# Patient Record
Sex: FEMALE | Race: WHITE | NOT HISPANIC OR LATINO | URBAN - METROPOLITAN AREA
[De-identification: names, ages, dates, MRNs, and addresses within clinical notes are randomized per-mention and may not be internally consistent; named-entity substitution may affect disease eponyms.]

---

## 2017-01-12 ENCOUNTER — INPATIENT (INPATIENT)
Facility: HOSPITAL | Age: 82
LOS: 4 days | Discharge: NURSING, REHAB OR LONG-TERM CARE | DRG: 312 | End: 2017-01-17
Attending: INTERNAL MEDICINE | Admitting: INTERNAL MEDICINE
Payer: MEDICARE

## 2017-01-12 VITALS
SYSTOLIC BLOOD PRESSURE: 160 MMHG | OXYGEN SATURATION: 90 % | WEIGHT: 134.92 LBS | RESPIRATION RATE: 18 BRPM | HEART RATE: 105 BPM | TEMPERATURE: 98 F | DIASTOLIC BLOOD PRESSURE: 110 MMHG

## 2017-01-12 DIAGNOSIS — R55 SYNCOPE AND COLLAPSE: ICD-10-CM

## 2017-01-12 LAB
ALBUMIN SERPL ELPH-MCNC: 3.4 G/DL — SIGNIFICANT CHANGE UP (ref 3.3–5)
ALP SERPL-CCNC: 82 U/L — SIGNIFICANT CHANGE UP (ref 40–120)
ALT FLD-CCNC: 20 U/L — SIGNIFICANT CHANGE UP (ref 12–78)
ANION GAP SERPL CALC-SCNC: 7 MMOL/L — SIGNIFICANT CHANGE UP (ref 5–17)
APPEARANCE UR: CLEAR — SIGNIFICANT CHANGE UP
AST SERPL-CCNC: 21 U/L — SIGNIFICANT CHANGE UP (ref 15–37)
BASOPHILS # BLD AUTO: 0.1 K/UL — SIGNIFICANT CHANGE UP (ref 0–0.2)
BASOPHILS NFR BLD AUTO: 1.2 % — SIGNIFICANT CHANGE UP (ref 0–2)
BILIRUB SERPL-MCNC: 0.2 MG/DL — SIGNIFICANT CHANGE UP (ref 0.2–1.2)
BILIRUB UR-MCNC: NEGATIVE — SIGNIFICANT CHANGE UP
BUN SERPL-MCNC: 16 MG/DL — SIGNIFICANT CHANGE UP (ref 7–23)
CALCIUM SERPL-MCNC: 8.7 MG/DL — SIGNIFICANT CHANGE UP (ref 8.5–10.1)
CHLORIDE SERPL-SCNC: 100 MMOL/L — SIGNIFICANT CHANGE UP (ref 96–108)
CO2 SERPL-SCNC: 30 MMOL/L — SIGNIFICANT CHANGE UP (ref 22–31)
COLOR SPEC: YELLOW — SIGNIFICANT CHANGE UP
CREAT SERPL-MCNC: 0.75 MG/DL — SIGNIFICANT CHANGE UP (ref 0.5–1.3)
DIFF PNL FLD: NEGATIVE — SIGNIFICANT CHANGE UP
EOSINOPHIL # BLD AUTO: 0.3 K/UL — SIGNIFICANT CHANGE UP (ref 0–0.5)
EOSINOPHIL NFR BLD AUTO: 6.7 % — HIGH (ref 0–6)
GLUCOSE SERPL-MCNC: 114 MG/DL — HIGH (ref 70–99)
GLUCOSE UR QL: NEGATIVE — SIGNIFICANT CHANGE UP
HCT VFR BLD CALC: 35.9 % — SIGNIFICANT CHANGE UP (ref 34.5–45)
HGB BLD-MCNC: 12 G/DL — SIGNIFICANT CHANGE UP (ref 11.5–15.5)
KETONES UR-MCNC: NEGATIVE — SIGNIFICANT CHANGE UP
LEUKOCYTE ESTERASE UR-ACNC: NEGATIVE — SIGNIFICANT CHANGE UP
LYMPHOCYTES # BLD AUTO: 1 K/UL — SIGNIFICANT CHANGE UP (ref 1–3.3)
LYMPHOCYTES # BLD AUTO: 20.8 % — SIGNIFICANT CHANGE UP (ref 13–44)
MCHC RBC-ENTMCNC: 29.9 PG — SIGNIFICANT CHANGE UP (ref 27–34)
MCHC RBC-ENTMCNC: 33.3 GM/DL — SIGNIFICANT CHANGE UP (ref 32–36)
MCV RBC AUTO: 89.8 FL — SIGNIFICANT CHANGE UP (ref 80–100)
MONOCYTES # BLD AUTO: 0.4 K/UL — SIGNIFICANT CHANGE UP (ref 0–0.9)
MONOCYTES NFR BLD AUTO: 7.5 % — SIGNIFICANT CHANGE UP (ref 1–9)
NEUTROPHILS # BLD AUTO: 3.1 K/UL — SIGNIFICANT CHANGE UP (ref 1.8–7.4)
NEUTROPHILS NFR BLD AUTO: 63.7 % — SIGNIFICANT CHANGE UP (ref 43–77)
NITRITE UR-MCNC: NEGATIVE — SIGNIFICANT CHANGE UP
PH UR: 7 — SIGNIFICANT CHANGE UP (ref 4.8–8)
PLATELET # BLD AUTO: 263 K/UL — SIGNIFICANT CHANGE UP (ref 150–400)
POTASSIUM SERPL-MCNC: 4 MMOL/L — SIGNIFICANT CHANGE UP (ref 3.5–5.3)
POTASSIUM SERPL-SCNC: 4 MMOL/L — SIGNIFICANT CHANGE UP (ref 3.5–5.3)
PROT SERPL-MCNC: 7.2 G/DL — SIGNIFICANT CHANGE UP (ref 6–8.3)
PROT UR-MCNC: NEGATIVE — SIGNIFICANT CHANGE UP
RBC # BLD: 4 M/UL — SIGNIFICANT CHANGE UP (ref 3.8–5.2)
RBC # FLD: 11.4 % — SIGNIFICANT CHANGE UP (ref 10.3–14.5)
SODIUM SERPL-SCNC: 137 MMOL/L — SIGNIFICANT CHANGE UP (ref 135–145)
SP GR SPEC: 1 — LOW (ref 1.01–1.02)
TROPONIN I SERPL-MCNC: <.015 NG/ML — SIGNIFICANT CHANGE UP (ref 0.01–0.04)
UROBILINOGEN FLD QL: NEGATIVE — SIGNIFICANT CHANGE UP
WBC # BLD: 4.9 K/UL — SIGNIFICANT CHANGE UP (ref 3.8–10.5)
WBC # FLD AUTO: 4.9 K/UL — SIGNIFICANT CHANGE UP (ref 3.8–10.5)

## 2017-01-12 PROCEDURE — 71010: CPT | Mod: 26

## 2017-01-12 PROCEDURE — 70450 CT HEAD/BRAIN W/O DYE: CPT | Mod: 26

## 2017-01-12 PROCEDURE — 93010 ELECTROCARDIOGRAM REPORT: CPT

## 2017-01-12 PROCEDURE — 99285 EMERGENCY DEPT VISIT HI MDM: CPT

## 2017-01-12 RX ORDER — ASPIRIN/CALCIUM CARB/MAGNESIUM 324 MG
81 TABLET ORAL DAILY
Qty: 0 | Refills: 0 | Status: DISCONTINUED | OUTPATIENT
Start: 2017-01-12 | End: 2017-01-17

## 2017-01-12 RX ORDER — ACETAMINOPHEN 500 MG
650 TABLET ORAL ONCE
Qty: 0 | Refills: 0 | Status: COMPLETED | OUTPATIENT
Start: 2017-01-12 | End: 2017-01-12

## 2017-01-12 RX ORDER — LISINOPRIL 2.5 MG/1
40 TABLET ORAL DAILY
Qty: 0 | Refills: 0 | Status: DISCONTINUED | OUTPATIENT
Start: 2017-01-12 | End: 2017-01-17

## 2017-01-12 RX ORDER — ENOXAPARIN SODIUM 100 MG/ML
40 INJECTION SUBCUTANEOUS DAILY
Qty: 0 | Refills: 0 | Status: DISCONTINUED | OUTPATIENT
Start: 2017-01-12 | End: 2017-01-17

## 2017-01-12 RX ORDER — GABAPENTIN 400 MG/1
100 CAPSULE ORAL DAILY
Qty: 0 | Refills: 0 | Status: DISCONTINUED | OUTPATIENT
Start: 2017-01-12 | End: 2017-01-17

## 2017-01-12 RX ORDER — INFLUENZA VIRUS VACCINE 15; 15; 15; 15 UG/.5ML; UG/.5ML; UG/.5ML; UG/.5ML
0.5 SUSPENSION INTRAMUSCULAR ONCE
Qty: 0 | Refills: 0 | Status: COMPLETED | OUTPATIENT
Start: 2017-01-12 | End: 2017-01-17

## 2017-01-12 RX ORDER — SODIUM CHLORIDE 9 MG/ML
1000 INJECTION INTRAMUSCULAR; INTRAVENOUS; SUBCUTANEOUS ONCE
Qty: 0 | Refills: 0 | Status: COMPLETED | OUTPATIENT
Start: 2017-01-12 | End: 2017-01-12

## 2017-01-12 RX ORDER — MECLIZINE HCL 12.5 MG
12.5 TABLET ORAL THREE TIMES A DAY
Qty: 0 | Refills: 0 | Status: DISCONTINUED | OUTPATIENT
Start: 2017-01-12 | End: 2017-01-17

## 2017-01-12 RX ORDER — ATORVASTATIN CALCIUM 80 MG/1
10 TABLET, FILM COATED ORAL AT BEDTIME
Qty: 0 | Refills: 0 | Status: DISCONTINUED | OUTPATIENT
Start: 2017-01-12 | End: 2017-01-17

## 2017-01-12 RX ORDER — LEVOTHYROXINE SODIUM 125 MCG
75 TABLET ORAL DAILY
Qty: 0 | Refills: 0 | Status: DISCONTINUED | OUTPATIENT
Start: 2017-01-12 | End: 2017-01-17

## 2017-01-12 RX ORDER — METOPROLOL TARTRATE 50 MG
50 TABLET ORAL DAILY
Qty: 0 | Refills: 0 | Status: DISCONTINUED | OUTPATIENT
Start: 2017-01-12 | End: 2017-01-14

## 2017-01-12 RX ORDER — AMLODIPINE BESYLATE 2.5 MG/1
10 TABLET ORAL DAILY
Qty: 0 | Refills: 0 | Status: DISCONTINUED | OUTPATIENT
Start: 2017-01-12 | End: 2017-01-17

## 2017-01-12 RX ORDER — MECLIZINE HCL 12.5 MG
25 TABLET ORAL ONCE
Qty: 0 | Refills: 0 | Status: COMPLETED | OUTPATIENT
Start: 2017-01-12 | End: 2017-01-12

## 2017-01-12 RX ADMIN — SODIUM CHLORIDE 1000 MILLILITER(S): 9 INJECTION INTRAMUSCULAR; INTRAVENOUS; SUBCUTANEOUS at 15:44

## 2017-01-12 RX ADMIN — Medication 650 MILLIGRAM(S): at 23:20

## 2017-01-12 RX ADMIN — ATORVASTATIN CALCIUM 10 MILLIGRAM(S): 80 TABLET, FILM COATED ORAL at 23:20

## 2017-01-12 RX ADMIN — Medication 25 MILLIGRAM(S): at 15:44

## 2017-01-12 NOTE — ED PROVIDER NOTE - CARE PLAN
Principal Discharge DX:	Syncope, unspecified syncope type Principal Discharge DX:	Syncope, unspecified syncope type  Secondary Diagnosis:	Vertigo

## 2017-01-12 NOTE — ED PROVIDER NOTE - OBJECTIVE STATEMENT
frequent falls over the last week 2/2 to intermittent dizziness and room spinning but cannot rule out syncope based on pt's story.  pt c/o progressive weakness in the lower extremities.  No n/v/d, no cough, no fevers/chills.

## 2017-01-12 NOTE — H&P ADULT. - HISTORY OF PRESENT ILLNESS
ALLERGY nka CONTACT Dtr Rosemarie Gatuam [phone]  REASON FOR VISIT  Initial evaluation/Admission HNP  requested  by Dr Duarte from Dr Lugo 1/12/2017  Patient examined chart reviewed  HOSPITAL ADMISSION [facility] P Dr Mahmood 1/12/2017 1/12/2017 W 4.9 Hb 12 Plt 263 Na 137 K 4 CO2 30 Cr .7 G 114  1/12 Tr 1 n  1/12 CXR napd   ASSESSMENT/RECOMMENDATION(S)  89 f PMH HLD Htn Hypothyroidism co with frequent falls over last week sec to intermittent dizziness admitted 1/12/2017 with worsening dizziness possible actual syncope episodes Patient co progressive weakness legs  HOME MEDS Synthroid 75 Norvasc 10 metoprolol 50 gabapentin 100 pravastatin 40 oxybutynin 15 mirtazapine 15 lisinopril 40   ER VITALS 1/12 afeb 105 160/110 RA 90% 61k    POSSIBLE SYNCOPE   1/12 CT Head NC Chr lacunar scars r corona radiata and l ant basal ganglia Wallerian degeneration r cerebral peduncle Large L mastoid effusion   Cardiac monitor Neuro Cardio eval   MASTOID EFFUSION   1/12 Consider ENT eval/ ID eval   HYPERTENSION Norvasc 10 (1/12) Lisinopril 40 91/12) Metoprolol 50 (1/12)   DIZZINESS Meclizine 12.5x3p (1/12)   DIET DASH (1/12)

## 2017-01-12 NOTE — H&P ADULT. - ASSESSMENT
ALLERGY nka CONTACT Dtr Rosemarie Gautam [phone]  REASON FOR VISIT  Initial evaluation/Admission HNP  requested  by Dr Duarte from Dr Lugo 1/12/2017  Patient examined chart reviewed  HOSPITAL ADMISSION [facility] P Dr Mahmood 1/12/2017 1/12/2017 W 4.9 Hb 12 Plt 263 Na 137 K 4 CO2 30 Cr .7 G 114  1/12 Tr 1 n  1/12 CXR napd   ASSESSMENT/RECOMMENDATION(S)  89 f PMH HLD Htn Hypothyroidism co with frequent falls over last week sec to intermittent dizziness admitted 1/12/2017 with worsening dizziness possible actual syncope episodes Patient co progressive weakness legs  HOME MEDS Synthroid 75 Norvasc 10 metoprolol 50 gabapentin 100 pravastatin 40 oxybutynin 15 mirtazapine 15 lisinopril 40   ER VITALS 1/12 afeb 105 160/110 RA 90% 61k    POSSIBLE SYNCOPE   1/12 CT Head NC Chr lacunar scars r corona radiata and l ant basal ganglia Wallerian degeneration r cerebral peduncle Large L mastoid effusion   Cardiac monitor Neuro Cardio eval   MASTOID EFFUSION   1/12 Consider ENT eval/ ID eval   HYPERTENSION Norvasc 10 (1/12) Lisinopril 40 91/12) Metoprolol 50 (1/12)   DIZZINESS Meclizine 12.5x3p (1/12)   DIET DASH (1/12)

## 2017-01-12 NOTE — ED ADULT NURSE NOTE - OBJECTIVE STATEMENT
Pt presents to the ED c/o dizziness with movement, EKG done, pt placed on cardiac monitor. Skin warm and dry, = sensation, + capillary refill < 3 sec, + edema bilat lower extremities.

## 2017-01-13 LAB
ANION GAP SERPL CALC-SCNC: 10 MMOL/L — SIGNIFICANT CHANGE UP (ref 5–17)
BASOPHILS # BLD AUTO: 0 K/UL — SIGNIFICANT CHANGE UP (ref 0–0.2)
BASOPHILS NFR BLD AUTO: 0.8 % — SIGNIFICANT CHANGE UP (ref 0–2)
BUN SERPL-MCNC: 14 MG/DL — SIGNIFICANT CHANGE UP (ref 7–23)
CALCIUM SERPL-MCNC: 8.7 MG/DL — SIGNIFICANT CHANGE UP (ref 8.5–10.1)
CHLORIDE SERPL-SCNC: 100 MMOL/L — SIGNIFICANT CHANGE UP (ref 96–108)
CO2 SERPL-SCNC: 29 MMOL/L — SIGNIFICANT CHANGE UP (ref 22–31)
CREAT SERPL-MCNC: 0.82 MG/DL — SIGNIFICANT CHANGE UP (ref 0.5–1.3)
EOSINOPHIL # BLD AUTO: 0.2 K/UL — SIGNIFICANT CHANGE UP (ref 0–0.5)
EOSINOPHIL NFR BLD AUTO: 4.3 % — SIGNIFICANT CHANGE UP (ref 0–6)
GLUCOSE SERPL-MCNC: 166 MG/DL — HIGH (ref 70–99)
HCT VFR BLD CALC: 39.9 % — SIGNIFICANT CHANGE UP (ref 34.5–45)
HGB BLD-MCNC: 12.9 G/DL — SIGNIFICANT CHANGE UP (ref 11.5–15.5)
INR BLD: 1.07 RATIO — SIGNIFICANT CHANGE UP (ref 0.88–1.16)
LYMPHOCYTES # BLD AUTO: 1 K/UL — SIGNIFICANT CHANGE UP (ref 1–3.3)
LYMPHOCYTES # BLD AUTO: 20.4 % — SIGNIFICANT CHANGE UP (ref 13–44)
MCHC RBC-ENTMCNC: 29.3 PG — SIGNIFICANT CHANGE UP (ref 27–34)
MCHC RBC-ENTMCNC: 32.2 GM/DL — SIGNIFICANT CHANGE UP (ref 32–36)
MCV RBC AUTO: 90.8 FL — SIGNIFICANT CHANGE UP (ref 80–100)
MONOCYTES # BLD AUTO: 0.3 K/UL — SIGNIFICANT CHANGE UP (ref 0–0.9)
MONOCYTES NFR BLD AUTO: 7 % — SIGNIFICANT CHANGE UP (ref 1–9)
NEUTROPHILS # BLD AUTO: 3.4 K/UL — SIGNIFICANT CHANGE UP (ref 1.8–7.4)
NEUTROPHILS NFR BLD AUTO: 67.6 % — SIGNIFICANT CHANGE UP (ref 43–77)
PLATELET # BLD AUTO: 273 K/UL — SIGNIFICANT CHANGE UP (ref 150–400)
POTASSIUM SERPL-MCNC: 3.4 MMOL/L — LOW (ref 3.5–5.3)
POTASSIUM SERPL-SCNC: 3.4 MMOL/L — LOW (ref 3.5–5.3)
PROTHROM AB SERPL-ACNC: 11.9 SEC — SIGNIFICANT CHANGE UP (ref 10–13.1)
RBC # BLD: 4.4 M/UL — SIGNIFICANT CHANGE UP (ref 3.8–5.2)
RBC # FLD: 11.5 % — SIGNIFICANT CHANGE UP (ref 10.3–14.5)
SODIUM SERPL-SCNC: 139 MMOL/L — SIGNIFICANT CHANGE UP (ref 135–145)
TROPONIN I SERPL-MCNC: <.015 NG/ML — SIGNIFICANT CHANGE UP (ref 0.01–0.04)
TROPONIN I SERPL-MCNC: <.015 NG/ML — SIGNIFICANT CHANGE UP (ref 0.01–0.04)
WBC # BLD: 5 K/UL — SIGNIFICANT CHANGE UP (ref 3.8–10.5)
WBC # FLD AUTO: 5 K/UL — SIGNIFICANT CHANGE UP (ref 3.8–10.5)

## 2017-01-13 PROCEDURE — 70551 MRI BRAIN STEM W/O DYE: CPT | Mod: 26

## 2017-01-13 RX ORDER — POTASSIUM CHLORIDE 20 MEQ
40 PACKET (EA) ORAL ONCE
Qty: 0 | Refills: 0 | Status: COMPLETED | OUTPATIENT
Start: 2017-01-13 | End: 2017-01-13

## 2017-01-13 RX ORDER — ACETAMINOPHEN 500 MG
650 TABLET ORAL ONCE
Qty: 0 | Refills: 0 | Status: COMPLETED | OUTPATIENT
Start: 2017-01-13 | End: 2017-01-13

## 2017-01-13 RX ORDER — SODIUM CHLORIDE 9 MG/ML
1000 INJECTION INTRAMUSCULAR; INTRAVENOUS; SUBCUTANEOUS
Qty: 0 | Refills: 0 | Status: DISCONTINUED | OUTPATIENT
Start: 2017-01-13 | End: 2017-01-15

## 2017-01-13 RX ADMIN — Medication 650 MILLIGRAM(S): at 12:19

## 2017-01-13 RX ADMIN — Medication 50 MILLIGRAM(S): at 06:11

## 2017-01-13 RX ADMIN — ENOXAPARIN SODIUM 40 MILLIGRAM(S): 100 INJECTION SUBCUTANEOUS at 12:04

## 2017-01-13 RX ADMIN — LISINOPRIL 40 MILLIGRAM(S): 2.5 TABLET ORAL at 06:11

## 2017-01-13 RX ADMIN — Medication 40 MILLIEQUIVALENT(S): at 14:50

## 2017-01-13 RX ADMIN — Medication 650 MILLIGRAM(S): at 13:04

## 2017-01-13 RX ADMIN — GABAPENTIN 100 MILLIGRAM(S): 400 CAPSULE ORAL at 12:04

## 2017-01-13 RX ADMIN — AMLODIPINE BESYLATE 10 MILLIGRAM(S): 2.5 TABLET ORAL at 06:11

## 2017-01-13 RX ADMIN — Medication 650 MILLIGRAM(S): at 00:13

## 2017-01-13 RX ADMIN — Medication 75 MICROGRAM(S): at 06:11

## 2017-01-13 RX ADMIN — ATORVASTATIN CALCIUM 10 MILLIGRAM(S): 80 TABLET, FILM COATED ORAL at 21:58

## 2017-01-13 RX ADMIN — Medication 81 MILLIGRAM(S): at 12:04

## 2017-01-13 RX ADMIN — SODIUM CHLORIDE 50 MILLILITER(S): 9 INJECTION INTRAMUSCULAR; INTRAVENOUS; SUBCUTANEOUS at 07:02

## 2017-01-13 NOTE — PHYSICAL THERAPY INITIAL EVALUATION ADULT - ADDITIONAL COMMENTS
Patient recently moved in with her daughter after previous [place] stay last year. Daughter supervises patient with ambulation and ADL's due to hx of multiple falls after CVA. Private home, 1 step to enter, no rails. Patient uses RW to step up, and enter home with assist of daughter.

## 2017-01-13 NOTE — PHYSICAL THERAPY INITIAL EVALUATION ADULT - RANGE OF MOTION EXAMINATION, REHAB EVAL
bilateral upper extremity ROM was WFL (within functional limits)/bilateral lower extremity ROM was WFL (within functional limits)/pain with R knee AROM; wears knee wrap.

## 2017-01-13 NOTE — GOALS OF CARE CONVERSATION - PERSONAL ADVANCE DIRECTIVE - CONVERSATION DETAILS
spoke to pt daughter, on phone, pt was in [place] and grandson was hcp, he works full time and not always avialable, so pt came to live w daughter.no copy of hcp. phone # given, cell # was incorrect and unable to LM on [phone] # for Jesu, contacted rosemarie, pt daughter,  knows pt wishes. dnr dni no TF. lasha reviewed. Rosemarie will be surrogate for medical decisions since unable to contact grandson in [place].

## 2017-01-14 LAB
-  AMIKACIN: SIGNIFICANT CHANGE UP
-  AMPICILLIN/SULBACTAM: SIGNIFICANT CHANGE UP
-  AMPICILLIN: SIGNIFICANT CHANGE UP
-  AZTREONAM: SIGNIFICANT CHANGE UP
-  CEFAZOLIN: SIGNIFICANT CHANGE UP
-  CEFEPIME: SIGNIFICANT CHANGE UP
-  CEFOXITIN: SIGNIFICANT CHANGE UP
-  CEFTAZIDIME: SIGNIFICANT CHANGE UP
-  CEFTRIAXONE: SIGNIFICANT CHANGE UP
-  CIPROFLOXACIN: SIGNIFICANT CHANGE UP
-  ERTAPENEM: SIGNIFICANT CHANGE UP
-  GENTAMICIN: SIGNIFICANT CHANGE UP
-  IMIPENEM: SIGNIFICANT CHANGE UP
-  LEVOFLOXACIN: SIGNIFICANT CHANGE UP
-  MEROPENEM: SIGNIFICANT CHANGE UP
-  NITROFURANTOIN: SIGNIFICANT CHANGE UP
-  PIPERACILLIN/TAZOBACTAM: SIGNIFICANT CHANGE UP
-  TOBRAMYCIN: SIGNIFICANT CHANGE UP
-  TRIMETHOPRIM/SULFAMETHOXAZOLE: SIGNIFICANT CHANGE UP
ANION GAP SERPL CALC-SCNC: 7 MMOL/L — SIGNIFICANT CHANGE UP (ref 5–17)
BUN SERPL-MCNC: 15 MG/DL — SIGNIFICANT CHANGE UP (ref 7–23)
CALCIUM SERPL-MCNC: 8.8 MG/DL — SIGNIFICANT CHANGE UP (ref 8.5–10.1)
CHLORIDE SERPL-SCNC: 104 MMOL/L — SIGNIFICANT CHANGE UP (ref 96–108)
CO2 SERPL-SCNC: 29 MMOL/L — SIGNIFICANT CHANGE UP (ref 22–31)
CREAT SERPL-MCNC: 0.6 MG/DL — SIGNIFICANT CHANGE UP (ref 0.5–1.3)
CULTURE RESULTS: SIGNIFICANT CHANGE UP
GLUCOSE SERPL-MCNC: 96 MG/DL — SIGNIFICANT CHANGE UP (ref 70–99)
HCT VFR BLD CALC: 37 % — SIGNIFICANT CHANGE UP (ref 34.5–45)
HGB BLD-MCNC: 12.2 G/DL — SIGNIFICANT CHANGE UP (ref 11.5–15.5)
MCHC RBC-ENTMCNC: 30.4 PG — SIGNIFICANT CHANGE UP (ref 27–34)
MCHC RBC-ENTMCNC: 33.1 GM/DL — SIGNIFICANT CHANGE UP (ref 32–36)
MCV RBC AUTO: 92.1 FL — SIGNIFICANT CHANGE UP (ref 80–100)
METHOD TYPE: SIGNIFICANT CHANGE UP
ORGANISM # SPEC MICROSCOPIC CNT: SIGNIFICANT CHANGE UP
ORGANISM # SPEC MICROSCOPIC CNT: SIGNIFICANT CHANGE UP
PLATELET # BLD AUTO: 222 K/UL — SIGNIFICANT CHANGE UP (ref 150–400)
POTASSIUM SERPL-MCNC: 4 MMOL/L — SIGNIFICANT CHANGE UP (ref 3.5–5.3)
POTASSIUM SERPL-SCNC: 4 MMOL/L — SIGNIFICANT CHANGE UP (ref 3.5–5.3)
RBC # BLD: 4.02 M/UL — SIGNIFICANT CHANGE UP (ref 3.8–5.2)
RBC # FLD: 11.7 % — SIGNIFICANT CHANGE UP (ref 10.3–14.5)
SODIUM SERPL-SCNC: 140 MMOL/L — SIGNIFICANT CHANGE UP (ref 135–145)
SPECIMEN SOURCE: SIGNIFICANT CHANGE UP
WBC # BLD: 5 K/UL — SIGNIFICANT CHANGE UP (ref 3.8–10.5)
WBC # FLD AUTO: 5 K/UL — SIGNIFICANT CHANGE UP (ref 3.8–10.5)

## 2017-01-14 RX ORDER — ACETAMINOPHEN 500 MG
650 TABLET ORAL EVERY 6 HOURS
Qty: 0 | Refills: 0 | Status: DISCONTINUED | OUTPATIENT
Start: 2017-01-14 | End: 2017-01-17

## 2017-01-14 RX ORDER — ACETAMINOPHEN 500 MG
650 TABLET ORAL ONCE
Qty: 0 | Refills: 0 | Status: COMPLETED | OUTPATIENT
Start: 2017-01-14 | End: 2017-01-14

## 2017-01-14 RX ORDER — HYDRALAZINE HCL 50 MG
25 TABLET ORAL THREE TIMES A DAY
Qty: 0 | Refills: 0 | Status: DISCONTINUED | OUTPATIENT
Start: 2017-01-14 | End: 2017-01-17

## 2017-01-14 RX ADMIN — Medication 75 MICROGRAM(S): at 05:56

## 2017-01-14 RX ADMIN — SODIUM CHLORIDE 50 MILLILITER(S): 9 INJECTION INTRAMUSCULAR; INTRAVENOUS; SUBCUTANEOUS at 21:16

## 2017-01-14 RX ADMIN — AMLODIPINE BESYLATE 10 MILLIGRAM(S): 2.5 TABLET ORAL at 05:56

## 2017-01-14 RX ADMIN — Medication 50 MILLIGRAM(S): at 05:56

## 2017-01-14 RX ADMIN — Medication 650 MILLIGRAM(S): at 02:37

## 2017-01-14 RX ADMIN — Medication 81 MILLIGRAM(S): at 11:23

## 2017-01-14 RX ADMIN — ENOXAPARIN SODIUM 40 MILLIGRAM(S): 100 INJECTION SUBCUTANEOUS at 11:23

## 2017-01-14 RX ADMIN — Medication 25 MILLIGRAM(S): at 21:15

## 2017-01-14 RX ADMIN — Medication 650 MILLIGRAM(S): at 10:35

## 2017-01-14 RX ADMIN — SODIUM CHLORIDE 50 MILLILITER(S): 9 INJECTION INTRAMUSCULAR; INTRAVENOUS; SUBCUTANEOUS at 03:08

## 2017-01-14 RX ADMIN — Medication 650 MILLIGRAM(S): at 11:35

## 2017-01-14 RX ADMIN — ATORVASTATIN CALCIUM 10 MILLIGRAM(S): 80 TABLET, FILM COATED ORAL at 21:16

## 2017-01-14 RX ADMIN — Medication 650 MILLIGRAM(S): at 21:17

## 2017-01-14 RX ADMIN — Medication 650 MILLIGRAM(S): at 03:03

## 2017-01-14 RX ADMIN — LISINOPRIL 40 MILLIGRAM(S): 2.5 TABLET ORAL at 05:56

## 2017-01-14 RX ADMIN — GABAPENTIN 100 MILLIGRAM(S): 400 CAPSULE ORAL at 11:23

## 2017-01-14 RX ADMIN — Medication 650 MILLIGRAM(S): at 23:14

## 2017-01-15 LAB — PROCALCITONIN SERPL-MCNC: 0.16 NG/ML — HIGH (ref 0–0.05)

## 2017-01-15 RX ORDER — CEFUROXIME AXETIL 250 MG
500 TABLET ORAL EVERY 12 HOURS
Qty: 0 | Refills: 0 | Status: DISCONTINUED | OUTPATIENT
Start: 2017-01-15 | End: 2017-01-17

## 2017-01-15 RX ADMIN — LISINOPRIL 40 MILLIGRAM(S): 2.5 TABLET ORAL at 05:52

## 2017-01-15 RX ADMIN — Medication 25 MILLIGRAM(S): at 16:15

## 2017-01-15 RX ADMIN — Medication 650 MILLIGRAM(S): at 20:52

## 2017-01-15 RX ADMIN — Medication 650 MILLIGRAM(S): at 20:51

## 2017-01-15 RX ADMIN — Medication 25 MILLIGRAM(S): at 20:50

## 2017-01-15 RX ADMIN — Medication 25 MILLIGRAM(S): at 05:53

## 2017-01-15 RX ADMIN — Medication 500 MILLIGRAM(S): at 17:06

## 2017-01-15 RX ADMIN — Medication 75 MICROGRAM(S): at 05:52

## 2017-01-15 RX ADMIN — AMLODIPINE BESYLATE 10 MILLIGRAM(S): 2.5 TABLET ORAL at 05:52

## 2017-01-15 RX ADMIN — GABAPENTIN 100 MILLIGRAM(S): 400 CAPSULE ORAL at 11:09

## 2017-01-15 RX ADMIN — ATORVASTATIN CALCIUM 10 MILLIGRAM(S): 80 TABLET, FILM COATED ORAL at 20:49

## 2017-01-15 RX ADMIN — ENOXAPARIN SODIUM 40 MILLIGRAM(S): 100 INJECTION SUBCUTANEOUS at 11:09

## 2017-01-15 RX ADMIN — Medication 81 MILLIGRAM(S): at 11:10

## 2017-01-16 LAB
ALBUMIN SERPL ELPH-MCNC: 3.1 G/DL — LOW (ref 3.3–5)
ALP SERPL-CCNC: 74 U/L — SIGNIFICANT CHANGE UP (ref 40–120)
ALT FLD-CCNC: 16 U/L — SIGNIFICANT CHANGE UP (ref 12–78)
ANION GAP SERPL CALC-SCNC: 5 MMOL/L — SIGNIFICANT CHANGE UP (ref 5–17)
AST SERPL-CCNC: 19 U/L — SIGNIFICANT CHANGE UP (ref 15–37)
BILIRUB SERPL-MCNC: 0.3 MG/DL — SIGNIFICANT CHANGE UP (ref 0.2–1.2)
BUN SERPL-MCNC: 11 MG/DL — SIGNIFICANT CHANGE UP (ref 7–23)
CALCIUM SERPL-MCNC: 8.4 MG/DL — LOW (ref 8.5–10.1)
CHLORIDE SERPL-SCNC: 103 MMOL/L — SIGNIFICANT CHANGE UP (ref 96–108)
CO2 SERPL-SCNC: 31 MMOL/L — SIGNIFICANT CHANGE UP (ref 22–31)
CREAT SERPL-MCNC: 0.64 MG/DL — SIGNIFICANT CHANGE UP (ref 0.5–1.3)
GLUCOSE SERPL-MCNC: 100 MG/DL — HIGH (ref 70–99)
HCT VFR BLD CALC: 33.3 % — LOW (ref 34.5–45)
HGB BLD-MCNC: 11.2 G/DL — LOW (ref 11.5–15.5)
INR BLD: 1.04 RATIO — SIGNIFICANT CHANGE UP (ref 0.88–1.16)
MCHC RBC-ENTMCNC: 30.7 PG — SIGNIFICANT CHANGE UP (ref 27–34)
MCHC RBC-ENTMCNC: 33.6 GM/DL — SIGNIFICANT CHANGE UP (ref 32–36)
MCV RBC AUTO: 91.3 FL — SIGNIFICANT CHANGE UP (ref 80–100)
PLATELET # BLD AUTO: 217 K/UL — SIGNIFICANT CHANGE UP (ref 150–400)
POTASSIUM SERPL-MCNC: 3.5 MMOL/L — SIGNIFICANT CHANGE UP (ref 3.5–5.3)
POTASSIUM SERPL-SCNC: 3.5 MMOL/L — SIGNIFICANT CHANGE UP (ref 3.5–5.3)
PROT SERPL-MCNC: 6.5 G/DL — SIGNIFICANT CHANGE UP (ref 6–8.3)
PROTHROM AB SERPL-ACNC: 11.6 SEC — SIGNIFICANT CHANGE UP (ref 10–13.1)
RBC # BLD: 3.65 M/UL — LOW (ref 3.8–5.2)
RBC # FLD: 11.7 % — SIGNIFICANT CHANGE UP (ref 10.3–14.5)
SODIUM SERPL-SCNC: 139 MMOL/L — SIGNIFICANT CHANGE UP (ref 135–145)
WBC # BLD: 4.5 K/UL — SIGNIFICANT CHANGE UP (ref 3.8–10.5)
WBC # FLD AUTO: 4.5 K/UL — SIGNIFICANT CHANGE UP (ref 3.8–10.5)

## 2017-01-16 PROCEDURE — 73562 X-RAY EXAM OF KNEE 3: CPT | Mod: 26,RT

## 2017-01-16 RX ORDER — LIDOCAINE 4 G/100G
1 CREAM TOPICAL ONCE
Qty: 0 | Refills: 0 | Status: DISCONTINUED | OUTPATIENT
Start: 2017-01-16 | End: 2017-01-16

## 2017-01-16 RX ORDER — LIDOCAINE 4 G/100G
1 CREAM TOPICAL DAILY
Qty: 0 | Refills: 0 | Status: DISCONTINUED | OUTPATIENT
Start: 2017-01-16 | End: 2017-01-17

## 2017-01-16 RX ADMIN — ENOXAPARIN SODIUM 40 MILLIGRAM(S): 100 INJECTION SUBCUTANEOUS at 11:05

## 2017-01-16 RX ADMIN — Medication 25 MILLIGRAM(S): at 21:21

## 2017-01-16 RX ADMIN — Medication 650 MILLIGRAM(S): at 06:30

## 2017-01-16 RX ADMIN — Medication 25 MILLIGRAM(S): at 14:32

## 2017-01-16 RX ADMIN — Medication 25 MILLIGRAM(S): at 05:13

## 2017-01-16 RX ADMIN — Medication 75 MICROGRAM(S): at 05:13

## 2017-01-16 RX ADMIN — GABAPENTIN 100 MILLIGRAM(S): 400 CAPSULE ORAL at 11:04

## 2017-01-16 RX ADMIN — AMLODIPINE BESYLATE 10 MILLIGRAM(S): 2.5 TABLET ORAL at 05:13

## 2017-01-16 RX ADMIN — ATORVASTATIN CALCIUM 10 MILLIGRAM(S): 80 TABLET, FILM COATED ORAL at 21:16

## 2017-01-16 RX ADMIN — Medication 81 MILLIGRAM(S): at 11:04

## 2017-01-16 RX ADMIN — Medication 500 MILLIGRAM(S): at 17:51

## 2017-01-16 RX ADMIN — LISINOPRIL 40 MILLIGRAM(S): 2.5 TABLET ORAL at 05:13

## 2017-01-16 RX ADMIN — Medication 500 MILLIGRAM(S): at 05:13

## 2017-01-16 NOTE — DISCHARGE NOTE ADULT - CARE PLAN
Principal Discharge DX:	Syncope, unspecified syncope type  Goal:	resolution of dizziness,fall precautions  Instructions for follow-up, activity and diet:	continue current managmnet and medications ,encourage po fluids  Secondary Diagnosis:	Vertigo  Instructions for follow-up, activity and diet:	continue current managmnet and medications  Secondary Diagnosis:	Hypothyroidism  Instructions for follow-up, activity and diet:	continue home meidcations  Secondary Diagnosis:	Hypertension  Instructions for follow-up, activity and diet:	BP monitoring,continue current antihypertensive meds, low salt diet,followup with PMD in 1-2 weeks  Secondary Diagnosis:	Hyperlipidemia  Instructions for follow-up, activity and diet:	continue home meidcations

## 2017-01-16 NOTE — DISCHARGE NOTE ADULT - NS AS DC STROKE ED MATERIALS
Stroke Education Booklet/Risk Factors for Stroke/Advanced age is a risk factor for Strokes/Prescribed Medications/Call 911 for Stroke/Stroke Warning Signs and Symptoms/Need for Followup After Discharge Advanced age is a risk factor for Strokes

## 2017-01-16 NOTE — DISCHARGE NOTE ADULT - HOSPITAL COURSE
Patient examined chart reviewed  HOSPITAL ADMISSION [facility] P Dr Mahmood 1/12/2017 1/12/2017 W 4.9 Hb 12 Plt 263 Na 137 K 4 CO2 30 Cr .7 G 114  1/12 Tr 1 n  1/12 CXR napd   ASSESSMENT/RECOMMENDATION(S)  89 f PMH HLD Htn Hypothyroidism co with frequent falls over last week sec to intermittent dizziness admitted 1/12/2017 with worsening dizziness possible actual syncope episodes Patient co progressive weakness legs  HOME MEDS Synthroid 75 Norvasc 10 metoprolol 50 gabapentin 100 pravastatin 40 oxybutynin 15 mirtazapine 15 lisinopril 40   ER VITALS 1/12 afeb 105 160/110 RA 90% 61k    POSSIBLE SYNCOPE   1/12 CT Head NC Chr lacunar scars r corona radiata and l ant basal ganglia Wallerian degeneration r cerebral peduncle Large L mastoid effusion   Cardiac monitor Neuro Cardio eval   MASTOID EFFUSION   1/12 Consider ENT eval/ ID eval   HYPERTENSION Norvasc 10 (1/12) Lisinopril 40 91/12) Metoprolol 50 (1/12)   DIZZINESS Meclizine 12.5x3p (1/12)   DIET DASH (1/12) Diagnosed with mastoiditis ,recivedpo abx Seen by neurologist and cardiologist for syncopal workup ,after stabilisation of condition discharged to rehab with outpatient followups

## 2017-01-16 NOTE — DISCHARGE NOTE ADULT - PATIENT PORTAL LINK FT
“You can access the Follow[facility] Patient Portal, offered by [facility], by registering with the following website: [email or web address]”

## 2017-01-16 NOTE — DISCHARGE NOTE ADULT - PLAN OF CARE
resolution of dizziness,fall precautions continue current managmnet and medications ,encourage po fluids continue current managmnet and medications continue home meidcations BP monitoring,continue current antihypertensive meds, low salt diet,followup with PMD in 1-2 weeks

## 2017-01-16 NOTE — DISCHARGE NOTE ADULT - MEDICATION SUMMARY - MEDICATIONS TO TAKE
I will START or STAY ON the medications listed below when I get home from the hospital:    acetaminophen 325 mg oral tablet  -- 2 tab(s) by mouth every 6 hours, As needed, pain  -- Indication: For pain    aspirin 81 mg oral delayed release tablet  -- 1 tab(s) by mouth once a day  -- Indication: For cad    lisinopril 40 mg oral tablet  -- 1 tab(s) by mouth once a day  -- Indication: For Htn    enoxaparin  -- 40 milligram(s) subcutaneous once a day  -- Indication: For dvt prophylaxis    gabapentin 100 mg oral capsule  --  by mouth once a day (at bedtime)  -- Indication: For pain    mirtazapine 15 mg oral tablet  -- 1 tab(s) by mouth once a day (at bedtime)  -- Indication: For insomnia    meclizine 12.5 mg oral tablet  -- 1 tab(s) by mouth 3 times a day, As needed, Dizziness  -- Indication: For Vertigo    pravastatin 40 mg oral tablet  -- 1 tab(s) by mouth once a day  -- Indication: For Hld    metoprolol succinate 25 mg oral tablet, extended release  --  by mouth once a day,hold for sbp below 100 and hr below 60  -- Indication: For Htn    amLODIPine 10 mg oral tablet  -- 1 tab(s) by mouth once a day  -- Indication: For Htn    cefuroxime 500 mg oral tablet  -- 1 tab(s) by mouth every 12 hours  -- Indication: For uti    lidocaine 5% topical film  -- Apply on skin to affected area once a day right knee 8 am - 8 pm  -- Indication: For khee pain     Synthroid 75 mcg (0.075 mg) oral tablet  -- 1 tab(s) by mouth once a day  -- Indication: For Hypothyroidism    oxybutynin 15 mg/24 hr oral tablet, extended release  -- 1 tab(s) by mouth once a day  -- Indication: For urinary incontinence     hydrALAZINE 25 mg oral tablet  -- 1 tab(s) by mouth 3 times a day  -- Indication: For Htn I will START or STAY ON the medications listed below when I get home from the hospital:    acetaminophen 325 mg oral tablet  -- 2 tab(s) by mouth every 6 hours, As needed, pain  -- Indication: For pain    aspirin 81 mg oral delayed release tablet  -- 1 tab(s) by mouth once a day  -- Indication: For cad    lisinopril 40 mg oral tablet  -- 1 tab(s) by mouth once a day  -- Indication: For Htn    enoxaparin  -- 40 milligram(s) subcutaneous once a day  -- Indication: For dvt prophylaxis    gabapentin 100 mg oral capsule  --  by mouth once a day (at bedtime)  -- Indication: For pain    mirtazapine 15 mg oral tablet  -- 1 tab(s) by mouth once a day (at bedtime)  -- Indication: For insomnia    meclizine 12.5 mg oral tablet  -- 1 tab(s) by mouth 3 times a day, As needed, Dizziness  -- Indication: For Vertigo    pravastatin 40 mg oral tablet  -- 1 tab(s) by mouth once a day  -- Indication: For Hld    metoprolol succinate 25 mg oral tablet, extended release  --  by mouth once a day,hold for sbp below 100 and hr below 60  -- Indication: For Htn    amLODIPine 10 mg oral tablet  -- 1 tab(s) by mouth once a day  -- Indication: For Htn    cefuroxime 500 mg oral tablet  -- 1 tab(s) by mouth every 12 hours  -- Indication: For uti    lidocaine 5% topical film  -- Apply on skin to affected area once a day right knee 8 am - 8 pm  -- Indication: For khee pain     famotidine 20 mg oral tablet  -- 1 tab(s) by mouth once a day  -- Indication: For gi prophylaxis    lactobacillus acidophilus oral capsule  --  by mouth 3 times a day  -- Indication: For Supplement    Synthroid 75 mcg (0.075 mg) oral tablet  -- 1 tab(s) by mouth once a day  -- Indication: For Hypothyroidism    oxybutynin 15 mg/24 hr oral tablet, extended release  -- 1 tab(s) by mouth once a day  -- Indication: For urinary incontinence     hydrALAZINE 25 mg oral tablet  -- 1 tab(s) by mouth 3 times a day  -- Indication: For Htn

## 2017-01-16 NOTE — DISCHARGE NOTE ADULT - MEDICATION SUMMARY - MEDICATIONS TO CHANGE
I will SWITCH the dose or number of times a day I take the medications listed below when I get home from the hospital:    metoprolol succinate 50 mg oral tablet, extended release  -- 1 tab(s) by mouth once a day

## 2017-01-16 NOTE — DISCHARGE NOTE ADULT - CARE PROVIDER_API CALL
Tyler Francisco), Cardiology Cardiology  [facility]  [address] [place] [address]  Phone: [phone]  Fax: [phone]    Tomasz Lugo), Critical Care Medicine; Internal Medicine; Pulmonary Disease  [address] [place] [address]  Phone: [phone]  Fax: [phone]

## 2017-01-17 VITALS
SYSTOLIC BLOOD PRESSURE: 153 MMHG | TEMPERATURE: 98 F | RESPIRATION RATE: 18 BRPM | HEART RATE: 69 BPM | OXYGEN SATURATION: 98 % | DIASTOLIC BLOOD PRESSURE: 76 MMHG

## 2017-01-17 LAB
ANION GAP SERPL CALC-SCNC: 6 MMOL/L — SIGNIFICANT CHANGE UP (ref 5–17)
BUN SERPL-MCNC: 12 MG/DL — SIGNIFICANT CHANGE UP (ref 7–23)
CALCIUM SERPL-MCNC: 8.7 MG/DL — SIGNIFICANT CHANGE UP (ref 8.5–10.1)
CHLORIDE SERPL-SCNC: 103 MMOL/L — SIGNIFICANT CHANGE UP (ref 96–108)
CO2 SERPL-SCNC: 30 MMOL/L — SIGNIFICANT CHANGE UP (ref 22–31)
CREAT SERPL-MCNC: 0.67 MG/DL — SIGNIFICANT CHANGE UP (ref 0.5–1.3)
GLUCOSE SERPL-MCNC: 99 MG/DL — SIGNIFICANT CHANGE UP (ref 70–99)
HCT VFR BLD CALC: 33.2 % — LOW (ref 34.5–45)
HGB BLD-MCNC: 11 G/DL — LOW (ref 11.5–15.5)
MCHC RBC-ENTMCNC: 29.8 PG — SIGNIFICANT CHANGE UP (ref 27–34)
MCHC RBC-ENTMCNC: 33.2 GM/DL — SIGNIFICANT CHANGE UP (ref 32–36)
MCV RBC AUTO: 89.8 FL — SIGNIFICANT CHANGE UP (ref 80–100)
PLATELET # BLD AUTO: 212 K/UL — SIGNIFICANT CHANGE UP (ref 150–400)
POTASSIUM SERPL-MCNC: 3.6 MMOL/L — SIGNIFICANT CHANGE UP (ref 3.5–5.3)
POTASSIUM SERPL-SCNC: 3.6 MMOL/L — SIGNIFICANT CHANGE UP (ref 3.5–5.3)
RBC # BLD: 3.7 M/UL — LOW (ref 3.8–5.2)
RBC # FLD: 11.4 % — SIGNIFICANT CHANGE UP (ref 10.3–14.5)
SODIUM SERPL-SCNC: 139 MMOL/L — SIGNIFICANT CHANGE UP (ref 135–145)
WBC # BLD: 4.1 K/UL — SIGNIFICANT CHANGE UP (ref 3.8–10.5)
WBC # FLD AUTO: 4.1 K/UL — SIGNIFICANT CHANGE UP (ref 3.8–10.5)

## 2017-01-17 PROCEDURE — 85027 COMPLETE CBC AUTOMATED: CPT

## 2017-01-17 PROCEDURE — 70450 CT HEAD/BRAIN W/O DYE: CPT

## 2017-01-17 PROCEDURE — 83735 ASSAY OF MAGNESIUM: CPT

## 2017-01-17 PROCEDURE — 87186 SC STD MICRODIL/AGAR DIL: CPT

## 2017-01-17 PROCEDURE — 97530 THERAPEUTIC ACTIVITIES: CPT

## 2017-01-17 PROCEDURE — 80053 COMPREHEN METABOLIC PANEL: CPT

## 2017-01-17 PROCEDURE — 87040 BLOOD CULTURE FOR BACTERIA: CPT

## 2017-01-17 PROCEDURE — 85610 PROTHROMBIN TIME: CPT

## 2017-01-17 PROCEDURE — 80048 BASIC METABOLIC PNL TOTAL CA: CPT

## 2017-01-17 PROCEDURE — 81003 URINALYSIS AUTO W/O SCOPE: CPT

## 2017-01-17 PROCEDURE — 73562 X-RAY EXAM OF KNEE 3: CPT

## 2017-01-17 PROCEDURE — 71045 X-RAY EXAM CHEST 1 VIEW: CPT

## 2017-01-17 PROCEDURE — 97116 GAIT TRAINING THERAPY: CPT

## 2017-01-17 PROCEDURE — 93005 ELECTROCARDIOGRAM TRACING: CPT

## 2017-01-17 PROCEDURE — 84443 ASSAY THYROID STIM HORMONE: CPT

## 2017-01-17 PROCEDURE — 99285 EMERGENCY DEPT VISIT HI MDM: CPT | Mod: 25

## 2017-01-17 PROCEDURE — 70551 MRI BRAIN STEM W/O DYE: CPT

## 2017-01-17 PROCEDURE — 96372 THER/PROPH/DIAG INJ SC/IM: CPT

## 2017-01-17 PROCEDURE — 93306 TTE W/DOPPLER COMPLETE: CPT

## 2017-01-17 PROCEDURE — 90686 IIV4 VACC NO PRSV 0.5 ML IM: CPT

## 2017-01-17 PROCEDURE — 87086 URINE CULTURE/COLONY COUNT: CPT

## 2017-01-17 PROCEDURE — 84145 PROCALCITONIN (PCT): CPT

## 2017-01-17 PROCEDURE — 84484 ASSAY OF TROPONIN QUANT: CPT

## 2017-01-17 PROCEDURE — 80061 LIPID PANEL: CPT

## 2017-01-17 RX ORDER — ACETAMINOPHEN 500 MG
2 TABLET ORAL
Qty: 0 | Refills: 0 | DISCHARGE
Start: 2017-01-17

## 2017-01-17 RX ORDER — LACTOBACILLUS ACIDOPHILUS 100MM CELL
0 CAPSULE ORAL
Qty: 0 | Refills: 0 | COMMUNITY
Start: 2017-01-17

## 2017-01-17 RX ORDER — FAMOTIDINE 10 MG/ML
1 INJECTION INTRAVENOUS
Qty: 0 | Refills: 0 | DISCHARGE
Start: 2017-01-17

## 2017-01-17 RX ORDER — METOPROLOL TARTRATE 50 MG
0 TABLET ORAL
Qty: 0 | Refills: 0 | DISCHARGE
Start: 2017-01-17

## 2017-01-17 RX ORDER — FAMOTIDINE 10 MG/ML
20 INJECTION INTRAVENOUS DAILY
Qty: 0 | Refills: 0 | Status: DISCONTINUED | OUTPATIENT
Start: 2017-01-17 | End: 2017-01-17

## 2017-01-17 RX ORDER — LIDOCAINE 4 G/100G
0 CREAM TOPICAL
Qty: 0 | Refills: 0 | COMMUNITY
Start: 2017-01-17

## 2017-01-17 RX ORDER — HYDRALAZINE HCL 50 MG
1 TABLET ORAL
Qty: 0 | Refills: 0 | COMMUNITY
Start: 2017-01-17

## 2017-01-17 RX ORDER — CEFUROXIME AXETIL 250 MG
1 TABLET ORAL
Qty: 0 | Refills: 0 | COMMUNITY
Start: 2017-01-17

## 2017-01-17 RX ORDER — ASPIRIN/CALCIUM CARB/MAGNESIUM 324 MG
1 TABLET ORAL
Qty: 0 | Refills: 0 | DISCHARGE
Start: 2017-01-17

## 2017-01-17 RX ORDER — MECLIZINE HCL 12.5 MG
1 TABLET ORAL
Qty: 0 | Refills: 0 | COMMUNITY
Start: 2017-01-17

## 2017-01-17 RX ORDER — ENOXAPARIN SODIUM 100 MG/ML
40 INJECTION SUBCUTANEOUS
Qty: 0 | Refills: 0 | COMMUNITY
Start: 2017-01-17

## 2017-01-17 RX ORDER — METOPROLOL TARTRATE 50 MG
1 TABLET ORAL
Qty: 0 | Refills: 0 | COMMUNITY

## 2017-01-17 RX ORDER — LACTOBACILLUS ACIDOPHILUS 100MM CELL
1 CAPSULE ORAL
Qty: 0 | Refills: 0 | Status: DISCONTINUED | OUTPATIENT
Start: 2017-01-17 | End: 2017-01-17

## 2017-01-17 RX ADMIN — LISINOPRIL 40 MILLIGRAM(S): 2.5 TABLET ORAL at 05:10

## 2017-01-17 RX ADMIN — FAMOTIDINE 20 MILLIGRAM(S): 10 INJECTION INTRAVENOUS at 12:41

## 2017-01-17 RX ADMIN — Medication 1 TABLET(S): at 12:41

## 2017-01-17 RX ADMIN — ENOXAPARIN SODIUM 40 MILLIGRAM(S): 100 INJECTION SUBCUTANEOUS at 12:42

## 2017-01-17 RX ADMIN — AMLODIPINE BESYLATE 10 MILLIGRAM(S): 2.5 TABLET ORAL at 05:10

## 2017-01-17 RX ADMIN — Medication 1 TABLET(S): at 08:49

## 2017-01-17 RX ADMIN — INFLUENZA VIRUS VACCINE 0.5 MILLILITER(S): 15; 15; 15; 15 SUSPENSION INTRAMUSCULAR at 16:14

## 2017-01-17 RX ADMIN — Medication 500 MILLIGRAM(S): at 05:10

## 2017-01-17 RX ADMIN — Medication 81 MILLIGRAM(S): at 12:41

## 2017-01-17 RX ADMIN — Medication 25 MILLIGRAM(S): at 14:17

## 2017-01-17 RX ADMIN — Medication 75 MICROGRAM(S): at 05:09

## 2017-01-17 RX ADMIN — GABAPENTIN 100 MILLIGRAM(S): 400 CAPSULE ORAL at 12:41

## 2017-01-17 RX ADMIN — LIDOCAINE 1 PATCH: 4 CREAM TOPICAL at 05:11

## 2017-01-19 DIAGNOSIS — I10 ESSENTIAL (PRIMARY) HYPERTENSION: ICD-10-CM

## 2017-01-19 DIAGNOSIS — M17.12 UNILATERAL PRIMARY OSTEOARTHRITIS, LEFT KNEE: ICD-10-CM

## 2017-01-19 DIAGNOSIS — B96.20 UNSPECIFIED ESCHERICHIA COLI [E. COLI] AS THE CAUSE OF DISEASES CLASSIFIED ELSEWHERE: ICD-10-CM

## 2017-01-19 DIAGNOSIS — Z66 DO NOT RESUSCITATE: ICD-10-CM

## 2017-01-19 DIAGNOSIS — R55 SYNCOPE AND COLLAPSE: ICD-10-CM

## 2017-01-19 DIAGNOSIS — F03.90 UNSPECIFIED DEMENTIA, UNSPECIFIED SEVERITY, WITHOUT BEHAVIORAL DISTURBANCE, PSYCHOTIC DISTURBANCE, MOOD DISTURBANCE, AND ANXIETY: ICD-10-CM

## 2017-01-19 DIAGNOSIS — E03.9 HYPOTHYROIDISM, UNSPECIFIED: ICD-10-CM

## 2017-01-19 DIAGNOSIS — E78.5 HYPERLIPIDEMIA, UNSPECIFIED: ICD-10-CM

## 2017-01-19 DIAGNOSIS — N39.0 URINARY TRACT INFECTION, SITE NOT SPECIFIED: ICD-10-CM

## 2017-01-19 DIAGNOSIS — H70.92 UNSPECIFIED MASTOIDITIS, LEFT EAR: ICD-10-CM

## 2017-01-19 DIAGNOSIS — R42 DIZZINESS AND GIDDINESS: ICD-10-CM

## 2017-01-20 LAB
CULTURE RESULTS: SIGNIFICANT CHANGE UP
SPECIMEN SOURCE: SIGNIFICANT CHANGE UP

## 2018-12-18 ENCOUNTER — INPATIENT (INPATIENT)
Facility: HOSPITAL | Age: 83
LOS: 2 days | Discharge: HOME | DRG: 884 | End: 2018-12-21
Attending: INTERNAL MEDICINE | Admitting: INTERNAL MEDICINE
Payer: MEDICARE

## 2018-12-18 VITALS
WEIGHT: 128.09 LBS | OXYGEN SATURATION: 92 % | RESPIRATION RATE: 17 BRPM | DIASTOLIC BLOOD PRESSURE: 90 MMHG | TEMPERATURE: 98 F | SYSTOLIC BLOOD PRESSURE: 170 MMHG | HEART RATE: 65 BPM

## 2018-12-18 DIAGNOSIS — E78.2 MIXED HYPERLIPIDEMIA: ICD-10-CM

## 2018-12-18 DIAGNOSIS — R53.83 OTHER FATIGUE: ICD-10-CM
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
## 2018-12-18 DIAGNOSIS — E03.9 HYPOTHYROIDISM, UNSPECIFIED: ICD-10-CM

## 2018-12-18 DIAGNOSIS — I10 ESSENTIAL (PRIMARY) HYPERTENSION: ICD-10-CM

## 2018-12-18 DIAGNOSIS — T83.511D INFECTION AND INFLAMMATORY REACTION DUE TO INDWELLING URETHRAL CATHETER, SUBSEQUENT ENCOUNTER: ICD-10-CM

## 2018-12-18 DIAGNOSIS — N39.0 URINARY TRACT INFECTION, SITE NOT SPECIFIED: ICD-10-CM

## 2018-12-18 DIAGNOSIS — M25.561 PAIN IN RIGHT KNEE: ICD-10-CM

## 2018-12-18 PROBLEM — E78.5 HYPERLIPIDEMIA, UNSPECIFIED: Chronic | Status: ACTIVE | Noted: 2017-01-12

## 2018-12-18 LAB
ALBUMIN SERPL ELPH-MCNC: 3.9 G/DL — SIGNIFICANT CHANGE UP (ref 3.3–5)
ALP SERPL-CCNC: 109 U/L — SIGNIFICANT CHANGE UP (ref 40–120)
ALT FLD-CCNC: 21 U/L — SIGNIFICANT CHANGE UP (ref 12–78)
ANION GAP SERPL CALC-SCNC: 6 MMOL/L — SIGNIFICANT CHANGE UP (ref 5–17)
APPEARANCE UR: ABNORMAL
AST SERPL-CCNC: 28 U/L — SIGNIFICANT CHANGE UP (ref 15–37)
BASOPHILS # BLD AUTO: 0.03 K/UL — SIGNIFICANT CHANGE UP (ref 0–0.2)
BASOPHILS NFR BLD AUTO: 0.5 % — SIGNIFICANT CHANGE UP (ref 0–2)
BILIRUB SERPL-MCNC: 0.4 MG/DL — SIGNIFICANT CHANGE UP (ref 0.2–1.2)
BILIRUB UR-MCNC: NEGATIVE — SIGNIFICANT CHANGE UP
BUN SERPL-MCNC: 19 MG/DL — SIGNIFICANT CHANGE UP (ref 7–23)
CALCIUM SERPL-MCNC: 8.6 MG/DL — SIGNIFICANT CHANGE UP (ref 8.5–10.1)
CHLORIDE SERPL-SCNC: 99 MMOL/L — SIGNIFICANT CHANGE UP (ref 96–108)
CO2 SERPL-SCNC: 33 MMOL/L — HIGH (ref 22–31)
COLOR SPEC: YELLOW — SIGNIFICANT CHANGE UP
CREAT SERPL-MCNC: 0.89 MG/DL — SIGNIFICANT CHANGE UP (ref 0.5–1.3)
DIFF PNL FLD: ABNORMAL
EOSINOPHIL # BLD AUTO: 0.36 K/UL — SIGNIFICANT CHANGE UP (ref 0–0.5)
EOSINOPHIL NFR BLD AUTO: 5.4 % — SIGNIFICANT CHANGE UP (ref 0–6)
GLUCOSE SERPL-MCNC: 123 MG/DL — HIGH (ref 70–99)
GLUCOSE UR QL: NEGATIVE — SIGNIFICANT CHANGE UP
HCT VFR BLD CALC: 41.8 % — SIGNIFICANT CHANGE UP (ref 34.5–45)
HGB BLD-MCNC: 13.8 G/DL — SIGNIFICANT CHANGE UP (ref 11.5–15.5)
IMM GRANULOCYTES NFR BLD AUTO: 0.3 % — SIGNIFICANT CHANGE UP (ref 0–1.5)
KETONES UR-MCNC: NEGATIVE — SIGNIFICANT CHANGE UP
LEUKOCYTE ESTERASE UR-ACNC: ABNORMAL
LYMPHOCYTES # BLD AUTO: 1.07 K/UL — SIGNIFICANT CHANGE UP (ref 1–3.3)
LYMPHOCYTES # BLD AUTO: 16.1 % — SIGNIFICANT CHANGE UP (ref 13–44)
MCHC RBC-ENTMCNC: 29.3 PG — SIGNIFICANT CHANGE UP (ref 27–34)
MCHC RBC-ENTMCNC: 33 GM/DL — SIGNIFICANT CHANGE UP (ref 32–36)
MCV RBC AUTO: 88.7 FL — SIGNIFICANT CHANGE UP (ref 80–100)
MONOCYTES # BLD AUTO: 0.39 K/UL — SIGNIFICANT CHANGE UP (ref 0–0.9)
MONOCYTES NFR BLD AUTO: 5.9 % — SIGNIFICANT CHANGE UP (ref 2–14)
NEUTROPHILS # BLD AUTO: 4.78 K/UL — SIGNIFICANT CHANGE UP (ref 1.8–7.4)
NEUTROPHILS NFR BLD AUTO: 71.8 % — SIGNIFICANT CHANGE UP (ref 43–77)
NITRITE UR-MCNC: POSITIVE
PH UR: 7 — SIGNIFICANT CHANGE UP (ref 5–8)
PLATELET # BLD AUTO: 195 K/UL — SIGNIFICANT CHANGE UP (ref 150–400)
POTASSIUM SERPL-MCNC: 3.9 MMOL/L — SIGNIFICANT CHANGE UP (ref 3.5–5.3)
POTASSIUM SERPL-SCNC: 3.9 MMOL/L — SIGNIFICANT CHANGE UP (ref 3.5–5.3)
PROT SERPL-MCNC: 8.3 G/DL — SIGNIFICANT CHANGE UP (ref 6–8.3)
PROT UR-MCNC: 25 MG/DL
RBC # BLD: 4.71 M/UL — SIGNIFICANT CHANGE UP (ref 3.8–5.2)
RBC # FLD: 12.2 % — SIGNIFICANT CHANGE UP (ref 10.3–14.5)
SODIUM SERPL-SCNC: 138 MMOL/L — SIGNIFICANT CHANGE UP (ref 135–145)
SP GR SPEC: 1.01 — SIGNIFICANT CHANGE UP (ref 1.01–1.02)
TROPONIN I SERPL-MCNC: <.015 NG/ML — SIGNIFICANT CHANGE UP (ref 0.01–0.04)
UROBILINOGEN FLD QL: NEGATIVE — SIGNIFICANT CHANGE UP
WBC # BLD: 6.65 K/UL — SIGNIFICANT CHANGE UP (ref 3.8–10.5)
WBC # FLD AUTO: 6.65 K/UL — SIGNIFICANT CHANGE UP (ref 3.8–10.5)

## 2018-12-18 PROCEDURE — 70450 CT HEAD/BRAIN W/O DYE: CPT | Mod: 26

## 2018-12-18 PROCEDURE — 71045 X-RAY EXAM CHEST 1 VIEW: CPT | Mod: 26

## 2018-12-18 PROCEDURE — 93010 ELECTROCARDIOGRAM REPORT: CPT

## 2018-12-18 PROCEDURE — 99285 EMERGENCY DEPT VISIT HI MDM: CPT

## 2018-12-18 PROCEDURE — 73562 X-RAY EXAM OF KNEE 3: CPT | Mod: 26,RT

## 2018-12-18 RX ORDER — AMLODIPINE BESYLATE 2.5 MG/1
5 TABLET ORAL DAILY
Qty: 0 | Refills: 0 | Status: DISCONTINUED | OUTPATIENT
Start: 2018-12-18 | End: 2018-12-21

## 2018-12-18 RX ORDER — ENOXAPARIN SODIUM 100 MG/ML
40 INJECTION SUBCUTANEOUS DAILY
Qty: 0 | Refills: 0 | Status: DISCONTINUED | OUTPATIENT
Start: 2018-12-18 | End: 2018-12-21

## 2018-12-18 RX ORDER — ACETAMINOPHEN 500 MG
650 TABLET ORAL EVERY 6 HOURS
Qty: 0 | Refills: 0 | Status: DISCONTINUED | OUTPATIENT
Start: 2018-12-18 | End: 2018-12-21

## 2018-12-18 RX ORDER — FAMOTIDINE 10 MG/ML
20 INJECTION INTRAVENOUS DAILY
Qty: 0 | Refills: 0 | Status: DISCONTINUED | OUTPATIENT
Start: 2018-12-18 | End: 2018-12-21

## 2018-12-18 RX ORDER — FUROSEMIDE 40 MG
20 TABLET ORAL DAILY
Qty: 0 | Refills: 0 | Status: DISCONTINUED | OUTPATIENT
Start: 2018-12-18 | End: 2018-12-21

## 2018-12-18 RX ORDER — MIRTAZAPINE 45 MG/1
15 TABLET, ORALLY DISINTEGRATING ORAL AT BEDTIME
Qty: 0 | Refills: 0 | Status: DISCONTINUED | OUTPATIENT
Start: 2018-12-18 | End: 2018-12-21

## 2018-12-18 RX ORDER — LACTOBACILLUS ACIDOPHILUS 100MM CELL
1 CAPSULE ORAL
Qty: 0 | Refills: 0 | Status: DISCONTINUED | OUTPATIENT
Start: 2018-12-18 | End: 2018-12-21

## 2018-12-18 RX ORDER — CEFTRIAXONE 500 MG/1
1 INJECTION, POWDER, FOR SOLUTION INTRAMUSCULAR; INTRAVENOUS ONCE
Qty: 0 | Refills: 0 | Status: COMPLETED | OUTPATIENT
Start: 2018-12-18 | End: 2018-12-18

## 2018-12-18 RX ORDER — LEVOTHYROXINE SODIUM 125 MCG
75 TABLET ORAL DAILY
Qty: 0 | Refills: 0 | Status: DISCONTINUED | OUTPATIENT
Start: 2018-12-18 | End: 2018-12-21

## 2018-12-18 RX ORDER — OXYBUTYNIN CHLORIDE 5 MG
1 TABLET ORAL
Qty: 0 | Refills: 0 | COMMUNITY

## 2018-12-18 RX ORDER — LISINOPRIL 2.5 MG/1
1 TABLET ORAL
Qty: 0 | Refills: 0 | COMMUNITY

## 2018-12-18 RX ORDER — METOPROLOL TARTRATE 50 MG
25 TABLET ORAL DAILY
Qty: 0 | Refills: 0 | Status: DISCONTINUED | OUTPATIENT
Start: 2018-12-18 | End: 2018-12-21

## 2018-12-18 RX ORDER — CEFTRIAXONE 500 MG/1
1 INJECTION, POWDER, FOR SOLUTION INTRAMUSCULAR; INTRAVENOUS EVERY 24 HOURS
Qty: 0 | Refills: 0 | Status: DISCONTINUED | OUTPATIENT
Start: 2018-12-19 | End: 2018-12-21

## 2018-12-18 RX ORDER — SODIUM CHLORIDE 9 MG/ML
1000 INJECTION INTRAMUSCULAR; INTRAVENOUS; SUBCUTANEOUS ONCE
Qty: 0 | Refills: 0 | Status: COMPLETED | OUTPATIENT
Start: 2018-12-18 | End: 2018-12-18

## 2018-12-18 RX ORDER — ASPIRIN/CALCIUM CARB/MAGNESIUM 324 MG
81 TABLET ORAL DAILY
Qty: 0 | Refills: 0 | Status: DISCONTINUED | OUTPATIENT
Start: 2018-12-18 | End: 2018-12-21

## 2018-12-18 RX ORDER — ROPINIROLE 8 MG/1
0.25 TABLET, FILM COATED, EXTENDED RELEASE ORAL AT BEDTIME
Qty: 0 | Refills: 0 | Status: DISCONTINUED | OUTPATIENT
Start: 2018-12-18 | End: 2018-12-21

## 2018-12-18 RX ADMIN — SODIUM CHLORIDE 1000 MILLILITER(S): 9 INJECTION INTRAMUSCULAR; INTRAVENOUS; SUBCUTANEOUS at 12:29

## 2018-12-18 RX ADMIN — SODIUM CHLORIDE 1000 MILLILITER(S): 9 INJECTION INTRAMUSCULAR; INTRAVENOUS; SUBCUTANEOUS at 15:51

## 2018-12-18 RX ADMIN — ROPINIROLE 0.25 MILLIGRAM(S): 8 TABLET, FILM COATED, EXTENDED RELEASE ORAL at 21:34

## 2018-12-18 RX ADMIN — CEFTRIAXONE 1 GRAM(S): 500 INJECTION, POWDER, FOR SOLUTION INTRAMUSCULAR; INTRAVENOUS at 15:51

## 2018-12-18 RX ADMIN — MIRTAZAPINE 15 MILLIGRAM(S): 45 TABLET, ORALLY DISINTEGRATING ORAL at 21:34

## 2018-12-18 RX ADMIN — CEFTRIAXONE 100 GRAM(S): 500 INJECTION, POWDER, FOR SOLUTION INTRAMUSCULAR; INTRAVENOUS at 14:03

## 2018-12-18 NOTE — ED PROVIDER NOTE - CARE PLAN
Universal Safety Interventions Principal Discharge DX:	Fall, initial encounter  Secondary Diagnosis:	Fatigue  Secondary Diagnosis:	Right knee pain, unspecified chronicity Principal Discharge DX:	Fall, initial encounter  Secondary Diagnosis:	Fatigue  Secondary Diagnosis:	Right knee pain, unspecified chronicity  Secondary Diagnosis:	UTI (urinary tract infection)

## 2018-12-18 NOTE — PHYSICAL THERAPY INITIAL EVALUATION ADULT - GENERAL OBSERVATIONS, REHAB EVAL
Patient received semifowler in ED stretcher, in NAD. Pt daughter and son-in-law outside room. Pt was agreeable /c PT eval but needed to use bedside commode.

## 2018-12-18 NOTE — PHYSICAL THERAPY INITIAL EVALUATION ADULT - PATIENT PROFILE REVIEW, REHAB EVAL
Pt came to ED due to s/p Fall, right knee pain and fatigue. Per medical team pt appears to have a UTI as well./yes

## 2018-12-18 NOTE — H&P ADULT - NSHPLABSRESULTS_GEN_ALL_CORE
< from: CT Head No Cont (12.18.18 @ 13:43) >      EXAM:  CT BRAIN                            PROCEDURE DATE:  12/18/2018          INTERPRETATION:  CLINICAL STATEMENT: Fall    TECHNIQUE: CT of the head was performed without IV contrast.    COMPARISON: None.    FINDINGS:  There is mild diffuse parenchymal volume loss. There are areas of low   attenuation in the periventricular white matter likely related to mild   chronic microvascular ischemic changes.    Chronic lacunar infarct right basal ganglia/anterior limb internal   capsule region    There is no acute intracranial hemorrhage, parenchymal mass, mass effect   or midline shift. There is no acute territorial infarct. There is no   hydrocephalus.    The cranium is intact.         Visualized paranasal sinuses well aerated. Opacification left mastoid air   cells noted.    IMPRESSION:  No acute intracranial hemorrhage            < end of copied text >

## 2018-12-18 NOTE — PHYSICAL THERAPY INITIAL EVALUATION ADULT - TRANSFER SAFETY CONCERNS NOTED: SIT/STAND, REHAB EVAL
decreased weight-shifting ability/decreased safety awareness/decreased sequencing ability/decreased balance during turns/decreased proprioception

## 2018-12-18 NOTE — ED ADULT NURSE NOTE - OBJECTIVE STATEMENT
Mechanical fall.  States R knee gave out on her.  No c/o anyind at this time.  No noted injuries.  No LOC

## 2018-12-18 NOTE — PHYSICAL THERAPY INITIAL EVALUATION ADULT - IMPAIRMENTS CONTRIBUTING TO GAIT DEVIATIONS, PT EVAL
narrow base of support/impaired postural control/decreased flexibility/decreased strength/impaired coordination/impaired balance

## 2018-12-18 NOTE — PHYSICAL THERAPY INITIAL EVALUATION ADULT - RANGE OF MOTION EXAMINATION, REHAB EVAL
bilateral lower extremity ROM was WFL (within functional limits)/deficits as listed below/arthritic changes noted kness, hands/bilateral upper extremity ROM was WFL (within functional limits)

## 2018-12-18 NOTE — PHYSICAL THERAPY INITIAL EVALUATION ADULT - IMPAIRED TRANSFERS: SIT/STAND, REHAB EVAL
decreased flexibility/decreased strength/impaired balance/impaired coordination/impaired postural control

## 2018-12-18 NOTE — PHYSICAL THERAPY INITIAL EVALUATION ADULT - IMPAIRMENTS FOUND, PT EVAL
joint integrity and mobility/gross motor/muscle strength/poor safety awareness/posture/aerobic capacity/endurance/arousal, attention, and cognition/gait, locomotion, and balance

## 2018-12-18 NOTE — H&P ADULT - HISTORY OF PRESENT ILLNESS
· Chief Complaint: The patient is a 89y Female complaining of fall.	  · Unable to Obtain: Dementia	  · Details: fair historian	  · HPI Objective Statement: 89 y female accompanied to ED by daughter, son in law, daughter states patient has been "very tired" for 3 days, has had urinary frequency x several days,   today patient walked to bathroom with her walker, daughter states she heard a thump, found patient on the floor, alert, and awake.  patient does recall falling, denies loc.  states her right knee "gave out", and that's why she fell, states "I have been tired these past few days", states she was not able to get up after fall.  denies chest pain, no sob, denies fever, no nausea, no vomiting, no diarrhea, denies recent illness. denies recent fall.  PMH:   Hyperlipidemia  ,Hypertension  ,Hypothyroidism  ,Nerve pain, daughter states patient has hx of cva in the past.  PMD Dr Krishna	  · Presenting Symptoms: WEAKNESS, right knee pain	  · Negative Findings: no abrasion, no deformity, no fever, no numbness, no vomiting	  · Location: right knee	  · Duration: today	  · Quality: right knee "gave out"	  · Severity: PAIN SCALE 0 OF 10.	  · Striking Against: bathroom floor	  · Witnessed By: none	  · Incident Location: home	  · Aggravated Factors: walking

## 2018-12-18 NOTE — PHYSICAL THERAPY INITIAL EVALUATION ADULT - DID THE PATIENT HAVE SURGERY?
Chest X-ray: Stable chest, (-) acute cardiopulmonary process; CT Head: (-) acute pathology; X-ray right knee: (-) fx, (+) tricompartmental OA/n/a

## 2018-12-18 NOTE — ED PROVIDER NOTE - OBJECTIVE STATEMENT
89 y female accompanied to ED by daughter, son in law, daughter states patient has been "very tired" for 3 days, has had urinary frequency x several days,   today patient walked to bathroom with her walker, daughter states she heard a thump, found patient on the floor, alert, and awake.  patient does recall falling, denies loc.  states her right knee "gave out", and that's why she fell, states "I have been tired these past few days", denies chest pain, no sob, denies fever, no nausea, no vomiting, no diarrhea, denies recent illness. denies recent fall.  PMH: 89 y female accompanied to ED by daughter, son in law, daughter states patient has been "very tired" for 3 days, has had urinary frequency x several days,   today patient walked to bathroom with her walker, daughter states she heard a thump, found patient on the floor, alert, and awake.  patient does recall falling, denies loc.  states her right knee "gave out", and that's why she fell, states "I have been tired these past few days", states she was not able to get up after fall.  denies chest pain, no sob, denies fever, no nausea, no vomiting, no diarrhea, denies recent illness. denies recent fall.  PMH:   Hyperlipidemia  ,Hypertension  ,Hypothyroidism  ,Nerve pain, daughter states patient has hx of cva in the past.  PMD Dr Krishna

## 2018-12-18 NOTE — PHYSICAL THERAPY INITIAL EVALUATION ADULT - PLANNED THERAPY INTERVENTIONS, PT EVAL
bed mobility training/gait training/balance training/transfer training/strengthening balance training/gait training/Addendum: G-code information.  The patient's primary goal was to improve mobility and ambulation.  Based on the subjective reports and objective findings the primary functional issue is related to G code 8978 CJ modifier. Because the patient will be followed in another setting, the goal and discharge status is as follows:  CJ for goal,  CJ for discharge. PT needs recc [place] vs. [facility]CP[facility] /c increased assistance./bed mobility training/strengthening/transfer training

## 2018-12-18 NOTE — PHYSICAL THERAPY INITIAL EVALUATION ADULT - ADDITIONAL COMMENTS
Pt has been living with daughter for the last 2 years now. Pt has a ramp plus 1 MANDA home. Pt lives /c daughter and son-in-law. Daughter supervises patient with ambulation and ADL's due to hx of multiple falls after CVA. Private home. Patient uses RW, has ramp, shower seat and grab bars.

## 2018-12-18 NOTE — ED PROVIDER NOTE - ATTENDING CONTRIBUTION TO CARE
89 yo female hx of dementia here with daughter and son in law c/o fatigue x 3 days and urinary frequency with clots for several days.  Family found patient on ground, denies LOC.  Having difficulty ambulating after fall.      patient AAOx2, CTA b/l, RRR, abdomen soft, nt, nd, unable to ambulate secondary to weakness    CT and Xray negative, UA + UTI, IV abx, PT and social work recommend admission.  Dr Krishna aware

## 2018-12-18 NOTE — PHYSICAL THERAPY INITIAL EVALUATION ADULT - GAIT DEVIATIONS NOTED, PT EVAL
mild R foot drag, hunched posture, laborious, slow and some slight unsteadiness /c gait/decreased ankita

## 2018-12-18 NOTE — PHYSICAL THERAPY INITIAL EVALUATION ADULT - DISCHARGE DISPOSITION, PT EVAL
home/home w/ assist/[place] vs. HCPT /c increased assistance (pt declines at this time for rehab)/rehabilitation facility

## 2018-12-18 NOTE — PHYSICAL THERAPY INITIAL EVALUATION ADULT - PERTINENT HX OF CURRENT PROBLEM, REHAB EVAL
As per ED note:"89 y female accompanied to ED by daughter, son in law, daughter states patient has been "very tired" for 3 days, has had urinary frequency x several days,   today patient walked to bathroom with her walker, daughter states she heard a thump, found patient on the floor, alert, and awake.  patient does recall falling, denies loc.  states her right knee "gave out", and that's why she fell, states "I have been tired these past few days", states she was not able to get up after fall.

## 2018-12-18 NOTE — H&P ADULT - ASSESSMENT
86 yo f , with osteoarthritis , osteoporosis , generalized weakness , falls , and depression and weakness , and plan for rehab , post uti treatment

## 2018-12-18 NOTE — ED ADULT NURSE NOTE - NSIMPLEMENTINTERV_GEN_ALL_ED
Implemented All Fall Risk Interventions:  [place] to call system. Call bell, personal items and telephone within reach. Instruct patient to call for assistance. Room bathroom lighting operational. Non-slip footwear when patient is off stretcher. Physically safe environment: no spills, clutter or unnecessary equipment. Stretcher in lowest position, wheels locked, appropriate side rails in place. Provide visual cue, wrist band, yellow gown, etc. Monitor gait and stability. Monitor for mental status changes and reorient to person, place, and time. Review medications for side effects contributing to fall risk. Reinforce activity limits and safety measures with patient and family.

## 2018-12-19 LAB
ANION GAP SERPL CALC-SCNC: 6 MMOL/L — SIGNIFICANT CHANGE UP (ref 5–17)
BASOPHILS # BLD AUTO: 0.03 K/UL — SIGNIFICANT CHANGE UP (ref 0–0.2)
BASOPHILS NFR BLD AUTO: 0.6 % — SIGNIFICANT CHANGE UP (ref 0–2)
BUN SERPL-MCNC: 15 MG/DL — SIGNIFICANT CHANGE UP (ref 7–23)
CALCIUM SERPL-MCNC: 8.7 MG/DL — SIGNIFICANT CHANGE UP (ref 8.5–10.1)
CHLORIDE SERPL-SCNC: 102 MMOL/L — SIGNIFICANT CHANGE UP (ref 96–108)
CO2 SERPL-SCNC: 32 MMOL/L — HIGH (ref 22–31)
CREAT SERPL-MCNC: 0.9 MG/DL — SIGNIFICANT CHANGE UP (ref 0.5–1.3)
EOSINOPHIL # BLD AUTO: 0.31 K/UL — SIGNIFICANT CHANGE UP (ref 0–0.5)
EOSINOPHIL NFR BLD AUTO: 6.3 % — HIGH (ref 0–6)
GLUCOSE SERPL-MCNC: 196 MG/DL — HIGH (ref 70–99)
HCT VFR BLD CALC: 39.2 % — SIGNIFICANT CHANGE UP (ref 34.5–45)
HGB BLD-MCNC: 12.9 G/DL — SIGNIFICANT CHANGE UP (ref 11.5–15.5)
IMM GRANULOCYTES NFR BLD AUTO: 0.2 % — SIGNIFICANT CHANGE UP (ref 0–1.5)
LYMPHOCYTES # BLD AUTO: 0.93 K/UL — LOW (ref 1–3.3)
LYMPHOCYTES # BLD AUTO: 18.8 % — SIGNIFICANT CHANGE UP (ref 13–44)
MCHC RBC-ENTMCNC: 29.2 PG — SIGNIFICANT CHANGE UP (ref 27–34)
MCHC RBC-ENTMCNC: 32.9 GM/DL — SIGNIFICANT CHANGE UP (ref 32–36)
MCV RBC AUTO: 88.7 FL — SIGNIFICANT CHANGE UP (ref 80–100)
MONOCYTES # BLD AUTO: 0.34 K/UL — SIGNIFICANT CHANGE UP (ref 0–0.9)
MONOCYTES NFR BLD AUTO: 6.9 % — SIGNIFICANT CHANGE UP (ref 2–14)
NEUTROPHILS # BLD AUTO: 3.33 K/UL — SIGNIFICANT CHANGE UP (ref 1.8–7.4)
NEUTROPHILS NFR BLD AUTO: 67.2 % — SIGNIFICANT CHANGE UP (ref 43–77)
NRBC # BLD: 0 /100 WBCS — SIGNIFICANT CHANGE UP (ref 0–0)
PLATELET # BLD AUTO: 195 K/UL — SIGNIFICANT CHANGE UP (ref 150–400)
POTASSIUM SERPL-MCNC: 3.5 MMOL/L — SIGNIFICANT CHANGE UP (ref 3.5–5.3)
POTASSIUM SERPL-SCNC: 3.5 MMOL/L — SIGNIFICANT CHANGE UP (ref 3.5–5.3)
RBC # BLD: 4.42 M/UL — SIGNIFICANT CHANGE UP (ref 3.8–5.2)
RBC # FLD: 11.9 % — SIGNIFICANT CHANGE UP (ref 10.3–14.5)
SODIUM SERPL-SCNC: 140 MMOL/L — SIGNIFICANT CHANGE UP (ref 135–145)
WBC # BLD: 4.95 K/UL — SIGNIFICANT CHANGE UP (ref 3.8–10.5)
WBC # FLD AUTO: 4.95 K/UL — SIGNIFICANT CHANGE UP (ref 3.8–10.5)

## 2018-12-19 RX ORDER — ROPINIROLE 8 MG/1
1 TABLET, FILM COATED, EXTENDED RELEASE ORAL
Qty: 0 | Refills: 0 | DISCHARGE
Start: 2018-12-19

## 2018-12-19 RX ORDER — FUROSEMIDE 40 MG
0 TABLET ORAL
Qty: 0 | Refills: 0 | COMMUNITY

## 2018-12-19 RX ORDER — ENOXAPARIN SODIUM 100 MG/ML
40 INJECTION SUBCUTANEOUS
Qty: 0 | Refills: 0 | COMMUNITY
Start: 2018-12-19

## 2018-12-19 RX ORDER — MIRTAZAPINE 45 MG/1
0 TABLET, ORALLY DISINTEGRATING ORAL
Qty: 0 | Refills: 0 | COMMUNITY

## 2018-12-19 RX ORDER — ROPINIROLE 8 MG/1
0 TABLET, FILM COATED, EXTENDED RELEASE ORAL
Qty: 0 | Refills: 0 | COMMUNITY

## 2018-12-19 RX ORDER — AMLODIPINE BESYLATE 2.5 MG/1
0 TABLET ORAL
Qty: 0 | Refills: 0 | COMMUNITY

## 2018-12-19 RX ORDER — AMLODIPINE BESYLATE 2.5 MG/1
1 TABLET ORAL
Qty: 0 | Refills: 0 | COMMUNITY

## 2018-12-19 RX ORDER — LACTOBACILLUS ACIDOPHILUS 100MM CELL
1 CAPSULE ORAL
Qty: 0 | Refills: 0 | DISCHARGE
Start: 2018-12-19

## 2018-12-19 RX ADMIN — MIRTAZAPINE 15 MILLIGRAM(S): 45 TABLET, ORALLY DISINTEGRATING ORAL at 21:45

## 2018-12-19 RX ADMIN — ENOXAPARIN SODIUM 40 MILLIGRAM(S): 100 INJECTION SUBCUTANEOUS at 12:22

## 2018-12-19 RX ADMIN — Medication 1 TABLET(S): at 19:36

## 2018-12-19 RX ADMIN — CEFTRIAXONE 100 GRAM(S): 500 INJECTION, POWDER, FOR SOLUTION INTRAMUSCULAR; INTRAVENOUS at 13:18

## 2018-12-19 RX ADMIN — Medication 1 TABLET(S): at 08:28

## 2018-12-19 RX ADMIN — Medication 81 MILLIGRAM(S): at 12:22

## 2018-12-19 RX ADMIN — Medication 75 MICROGRAM(S): at 05:18

## 2018-12-19 RX ADMIN — AMLODIPINE BESYLATE 5 MILLIGRAM(S): 2.5 TABLET ORAL at 05:18

## 2018-12-19 RX ADMIN — ROPINIROLE 0.25 MILLIGRAM(S): 8 TABLET, FILM COATED, EXTENDED RELEASE ORAL at 21:45

## 2018-12-19 RX ADMIN — Medication 20 MILLIGRAM(S): at 05:18

## 2018-12-19 RX ADMIN — Medication 25 MILLIGRAM(S): at 05:18

## 2018-12-19 RX ADMIN — FAMOTIDINE 20 MILLIGRAM(S): 10 INJECTION INTRAVENOUS at 12:22

## 2018-12-19 NOTE — DISCHARGE NOTE ADULT - NSFTFSTATUSABRD_GEN_ALL_CORE
Urine for UDS placed sterile container, collection cup labeled and sent to lab. PATIENT NEEDS ASSISTANCE TO LEAVE RESIDENCE:

## 2018-12-19 NOTE — DISCHARGE NOTE ADULT - CARE PLAN
Principal Discharge DX:	Fall, initial encounter  Goal:	PT eval  Assessment and plan of treatment:	rehab plan  Secondary Diagnosis:	Essential hypertension  Goal:	norvasc and metoprolol  Secondary Diagnosis:	Fatigue, unspecified type  Goal:	vitamins  Secondary Diagnosis:	Hypothyroidism, unspecified type  Goal:	synthroid  Secondary Diagnosis:	Mixed hyperlipidemia  Goal:	lipitor  Secondary Diagnosis:	Nerve pain  Goal:	gabapentin  Secondary Diagnosis:	Right knee pain, unspecified chronicity  Goal:	tylenol

## 2018-12-19 NOTE — DISCHARGE NOTE ADULT - HOSPITAL COURSE
· Chief Complaint: The patient is a 89y Female complaining of fall.	  · Unable to Obtain: Dementia	  · Details: fair historian	  · HPI Objective Statement: 89 y female accompanied to ED by daughter, son in law, daughter states patient has been "very tired" for 3 days, has had urinary frequency x several days,   today patient walked to bathroom with her walker, daughter states she heard a thump, found patient on the floor, alert, and awake.  patient does recall falling, denies loc.  states her right knee "gave out", and that's why she fell, states "I have been tired these past few days", states she was not able to get up after fall.  denies chest pain, no sob, denies fever, no nausea, no vomiting, no diarrhea, denies recent illness. denies recent fall.  PMH:   Hyperlipidemia  ,Hypertension  ,Hypothyroidism  ,Nerve pain, daughter states patient has hx of cva in the past.  PMD Dr Krishna	  · Presenting Symptoms: WEAKNESS, right knee pain	  · Negative Findings: no abrasion, no deformity, no fever, no numbness, no vomiting	  · Location: right knee	  · Duration: today	  · Quality: right knee "gave out"	  · Severity: PAIN SCALE 0 OF 10.	  · Striking Against: bathroom floor	  · Witnessed By: none	  · Incident Location: home	  · Aggravated Factors: walking	    · Assessment		  86 yo f , with osteoarthritis , osteoporosis , generalized weakness , falls , and depression and weakness , and plan for rehab , post uti treatment   on 12/19/18 admitted for fall and uti on Rocephin D# 2/5 , follow up cultures ,   general weakness , plan rehab , resume current meds , PT noted      Problem/Plan - 1:  ·  Problem: Fall, initial encounter.  Plan: possible frailty , plan rehab and PT.      Problem/Plan - 2:  ·  Problem: Fatigue, unspecified type.  Plan: patient eval.      Problem/Plan - 3:  ·  Problem: Hypothyroidism, unspecified type.  Plan: synthroid.      Problem/Plan - 4:  ·  Problem: Urinary tract infection associated with catheterization of urinary tract, unspecified indwelling urinary catheter type, subsequent encounter.  Plan: abx.      Problem/Plan - 5:  ·  Problem: Right knee pain, unspecified chronicity.  Plan: tylenol.      Problem/Plan - 6:  Problem: Essential hypertension. Plan: norvasc.     Problem/Plan - 7:  ·  Problem: Mixed hyperlipidemia.  Plan: lipitor.      Problem/Plan - 8:  ·  Problem: Urinary tract infection with hematuria, site unspecified.  Plan: abx.

## 2018-12-19 NOTE — DISCHARGE NOTE ADULT - MEDICATION SUMMARY - MEDICATIONS TO TAKE
I will START or STAY ON the medications listed below when I get home from the hospital:    acetaminophen 325 mg oral tablet  -- 2 tab(s) by mouth every 6 hours, As needed, pain  -- Indication: For pain    aspirin 81 mg oral delayed release tablet  -- 1 tab(s) by mouth once a day  -- Indication: For cad    enoxaparin  -- 40 milligram(s) subcutaneous once a day  -- Indication: For dvt prophylax    gabapentin 100 mg oral capsule  --  by mouth once a day (at bedtime)  -- Indication: For Neuropathy    mirtazapine 15 mg oral tablet  -- 1 tab(s) by mouth once a day (at bedtime)  -- Indication: For depression    pravastatin 40 mg oral tablet  -- 1 tab(s) by mouth once a day  -- Indication: For HHLD    rOPINIRole 0.25 mg oral tablet  -- 1 tab(s) by mouth once a day (at bedtime)  -- Indication: For Restless leg syndrom    metoprolol succinate 25 mg oral tablet, extended release  --  by mouth once a day,hold for sbp below 100 and hr below 60  -- Indication: For HTN    amLODIPine  -- 5 milligram(s) by mouth once a day  -- Indication: For HTN    Lasix  -- 20 milligram(s) by mouth once a day  -- Indication: For Edema    famotidine 20 mg oral tablet  -- 1 tab(s) by mouth once a day  -- Indication: For gerd    lactobacillus acidophilus oral capsule  -- 1 tab(s) by mouth 2 times a day  -- Indication: For probiotics    Synthroid 75 mcg (0.075 mg) oral tablet  -- 1 tab(s) by mouth once a day  -- Indication: For gypothyroidism I will START or STAY ON the medications listed below when I get home from the hospital:    acetaminophen 325 mg oral tablet  -- 2 tab(s) by mouth every 6 hours, As needed, pain  -- Indication: For Fall    aspirin 81 mg oral delayed release tablet  -- 1 tab(s) by mouth once a day  -- Indication: For cad    enoxaparin  -- 40 milligram(s) subcutaneous once a day  -- Indication: For dvt prophylax    gabapentin 100 mg oral capsule  --  by mouth once a day (at bedtime)  -- Indication: For Neuropathy    mirtazapine 15 mg oral tablet  -- 1 tab(s) by mouth once a day (at bedtime)  -- Indication: For depression    venlafaxine 37.5 mg oral tablet  -- 1 tab(s) by mouth 2 times a day (with meals)  -- Indication: For Fall    pravastatin 40 mg oral tablet  -- 1 tab(s) by mouth once a day  -- Indication: For HHLD    rOPINIRole 0.25 mg oral tablet  -- 1 tab(s) by mouth once a day (at bedtime)  -- Indication: For Restless leg syndrom    metoprolol succinate 25 mg oral tablet, extended release  --  by mouth once a day,hold for sbp below 100 and hr below 60  -- Indication: For HTN    amLODIPine  -- 5 milligram(s) by mouth once a day  -- Indication: For HTN    Lasix  -- 20 milligram(s) by mouth once a day  -- Indication: For Edema    famotidine 20 mg oral tablet  -- 1 tab(s) by mouth once a day  -- Indication: For gerd    lactobacillus acidophilus oral capsule  -- 1 tab(s) by mouth 2 times a day  -- Indication: For probiotics    Synthroid 75 mcg (0.075 mg) oral tablet  -- 1 tab(s) by mouth once a day  -- Indication: For gypothyroidism I will START or STAY ON the medications listed below when I get home from the hospital:    acetaminophen 325 mg oral tablet  -- 2 tab(s) by mouth every 6 hours, As needed, pain  -- Indication: For Fall    aspirin 81 mg oral delayed release tablet  -- 1 tab(s) by mouth once a day  -- Indication: For cad    gabapentin 100 mg oral capsule  --  by mouth once a day (at bedtime)  -- Indication: For Neuropathy    mirtazapine 15 mg oral tablet  -- 1 tab(s) by mouth once a day (at bedtime)  -- Indication: For depression    venlafaxine 37.5 mg oral tablet  -- 1 tab(s) by mouth 2 times a day (with meals)  -- Indication: For Fall    pravastatin 40 mg oral tablet  -- 1 tab(s) by mouth once a day  -- Indication: For HHLD    rOPINIRole 0.25 mg oral tablet  -- 1 tab(s) by mouth once a day (at bedtime)  -- Indication: For Restless leg syndrom    metoprolol succinate 25 mg oral tablet, extended release  --  by mouth once a day,hold for sbp below 100 and hr below 60  -- Indication: For HTN    amLODIPine  -- 5 milligram(s) by mouth once a day  -- Indication: For HTN    Lasix  -- 20 milligram(s) by mouth once a day  -- Indication: For Edema    famotidine 20 mg oral tablet  -- 1 tab(s) by mouth once a day  -- Indication: For gerd    lactobacillus acidophilus oral capsule  -- 1 tab(s) by mouth 2 times a day  -- Indication: For probiotics    Synthroid 75 mcg (0.075 mg) oral tablet  -- 1 tab(s) by mouth once a day  -- Indication: For gypothyroidism

## 2018-12-19 NOTE — PROGRESS NOTE ADULT - ASSESSMENT
86 yo f , with osteoarthritis , osteoporosis , generalized weakness , falls , and depression and weakness , and plan for rehab , post uti treatment   on 12/19/18 admitted for fall and uti on Rocephin D# 2/5 , follow up cultures ,   general weakness , plan rehab , resume current meds , PT noted 86 yo f , with osteoarthritis , osteoporosis , generalized weakness , falls , and depression and weakness , and plan for rehab , post uti treatment   on 12/19/18 admitted for fall and uti on Rocephin D# 2/5 , follow up cultures ,  patient has lots of arguments with her daughter that she lives with ,    general weakness , plan rehab , resume current meds , PT noted 84 yo f , with osteoarthritis , osteoporosis , generalized weakness , falls , and depression and weakness, hypertension , hld , hx of cva on asa and has right leg weakness,  , and plan for rehab , post uti treatment   on 12/19/18 admitted for fall and uti on Rocephin D# 2/5 , follow up cultures ,  patient has lots of arguments with her daughter that she lives with ,    general weakness , plan rehab , resume current meds , PT noted   patient does not want rehab as she is tired , has tried it in past and did not help , but encouraged her to try again and or possible dc home with home PT

## 2018-12-19 NOTE — DISCHARGE NOTE ADULT - PATIENT PORTAL LINK FT
You can access the [facility], offered by [facility], by registering with the following website: [email or web address]

## 2018-12-19 NOTE — DISCHARGE NOTE ADULT - SECONDARY DIAGNOSIS.
Essential hypertension Fatigue, unspecified type Hypothyroidism, unspecified type Mixed hyperlipidemia Nerve pain Right knee pain, unspecified chronicity

## 2018-12-19 NOTE — DISCHARGE NOTE ADULT - MEDICATION SUMMARY - MEDICATIONS TO CHANGE
I will SWITCH the dose or number of times a day I take the medications listed below when I get home from the hospital:    amLODIPine 10 mg oral tablet  -- 1 tab(s) by mouth once a day I will SWITCH the dose or number of times a day I take the medications listed below when I get home from the hospital:  None

## 2018-12-19 NOTE — DISCHARGE NOTE ADULT - CARE PROVIDER_API CALL
Gerardo Krishna), Internal Medicine  [address] [place] [address]  Phone: [phone]  Fax: [phone]

## 2018-12-19 NOTE — PROGRESS NOTE ADULT - SUBJECTIVE AND OBJECTIVE BOX
PCP  Subjective:   in bed , awake , alert , responsive , but weak , and forgetful at times , and slow to move and respond    Objective:   Vital Signs Last 24 Hrs  T(C): 36.6 (18 @ 05:46), Max: 36.6 (18 @ 20:40)  T(F): 97.9 (18 @ 05:46), Max: 97.9 (18 @ 05:46)  HR: 66 (18 @ 05:46) (65 - 77)  BP: 168/64 (18 @ 05:46) (156/87 - 174/74)  BP(mean): --  RR: 17 (18 @ 05:46) (17 - 18)  SpO2: 95% (18 @ 05:46) (92% - 96%)  Daily     Daily Weight in k.3 (19 Dec 2018 05:46)    GENERAL:  wdwn female , awake , follows commands , responsive , denies cp sob , or head aches   EYES: eomi denny  NECK: supple  CHEST/LUNG: clear   HEART: s1 s2 regular 2/6 carlos   ABDOMEN:  soft non tender , + bowel sounds ,   EXTREMITIES:  no edema ,   SKIN:  warm ,   CNS:  awake , alert responsive , non focal , general stiffness , and weak in general ,  MSK: ROM in joints wnl , poor balance , and needs help with movements     Allergies: Allergies    No Known Allergies    Intolerances        Home Medications:  acetaminophen 325 mg oral tablet: 2 tab(s) orally every 6 hours, As needed, pain (18 Dec 2018 11:38)  amLODIPine:  (18 Dec 2018 15:54)  amLODIPine 10 mg oral tablet: 1 tab(s) orally once a day (18 Dec 2018 11:38)  aspirin 81 mg oral delayed release tablet: 1 tab(s) orally once a day (18 Dec 2018 11:38)  famotidine 20 mg oral tablet: 1 tab(s) orally once a day (18 Dec 2018 11:38)  gabapentin 100 mg oral capsule:  orally once a day (at bedtime) (18 Dec 2018 11:38)  Lasix:  (18 Dec 2018 15:54)  metoprolol succinate 25 mg oral tablet, extended release:  orally once a day,hold for sbp below 100 and hr below 60 (18 Dec 2018 11:38)  mirtazapine:  (18 Dec 2018 15:54)  mirtazapine 15 mg oral tablet: 1 tab(s) orally once a day (at bedtime) (18 Dec 2018 11:38)  pravastatin 40 mg oral tablet: 1 tab(s) orally once a day (18 Dec 2018 11:38)  rOPINIRole:  (18 Dec 2018 15:54)  Synthroid 75 mcg (0.075 mg) oral tablet: 1 tab(s) orally once a day (18 Dec 2018 11:38)    Medications:   acetaminophen   Tablet .. 650 milliGRAM(s) Oral every 6 hours PRN  amLODIPine   Tablet 5 milliGRAM(s) Oral daily  aspirin enteric coated 81 milliGRAM(s) Oral daily  cefTRIAXone   IVPB 1 Gram(s) IV Intermittent every 24 hours  enoxaparin Injectable 40 milliGRAM(s) SubCutaneous daily  famotidine    Tablet 20 milliGRAM(s) Oral daily  furosemide    Tablet 20 milliGRAM(s) Oral daily  lactobacillus acidophilus 1 Tablet(s) Oral two times a day with meals  levothyroxine 75 MICROGram(s) Oral daily  metoprolol succinate ER 25 milliGRAM(s) Oral daily  mirtazapine 15 milliGRAM(s) Oral at bedtime  rOPINIRole 0.25 milliGRAM(s) Oral at bedtime      LABS:                        13.8   6.65  )-----------( 195      ( 18 Dec 2018 12:28 )             41.8     12-18    138  |  99  |  19  ----------------------------<  123<H>  3.9   |  33<H>  |  0.89    Ca    8.6      18 Dec 2018 12:28    TPro  8.3  /  Alb  3.9  /  TBili  0.4  /  DBili  x   /  AST  28  /  ALT  21  /  AlkPhos  109  12-18        Urinalysis Basic - ( 18 Dec 2018 12:28 )    Color: Yellow / Appearance: Slightly Turbid / S.010 / pH: x  Gluc: x / Ketone: Negative  / Bili: Negative / Urobili: Negative   Blood: x / Protein: 25 mg/dL / Nitrite: Positive   Leuk Esterase: Moderate / RBC: 0-2 /HPF / WBC >50   Sq Epi: x / Non Sq Epi: Few / Bacteria: Many        CAPILLARY BLOOD GLUCOSE      POCT Blood Glucose.: 127 mg/dL (18 Dec 2018 12:31)      CARDIAC MARKERS ( 18 Dec 2018 12:28 )  <.015 ng/mL / x     / x     / x     / x          RECENT CULTURES: PCP  Subjective:   in bed , awake , alert , responsive , but weak , and forgetful at times , and slow to move and respond    Objective:   Vital Signs Last 24 Hrs  T(C): 36.6 (18 @ 05:46), Max: 36.6 (18 @ 20:40)  T(F): 97.9 (18 @ 05:46), Max: 97.9 (18 @ 05:46)  HR: 66 (18 @ 05:46) (65 - 77)  BP: 168/64 (18 @ 05:46) (156/87 - 174/74)  BP(mean): --  RR: 17 (18 @ 05:46) (17 - 18)  SpO2: 95% (18 @ 05:46) (92% - 96%)  Daily     Daily Weight in k.3 (19 Dec 2018 05:46)    GENERAL:  wdwn female , awake , follows commands , responsive , denies cp sob , or head aches   EYES: eomi denny  NECK: supple  CHEST/LUNG: clear   HEART: s1 s2 regular 2/6 carlos   ABDOMEN:  soft non tender , + bowel sounds ,   EXTREMITIES:  no edema ,   SKIN:  warm ,   CNS:  awake , alert responsive , non focal , general stiffness , and weak in general ,  right leg weakness , and possible foot drop ,   MSK: ROM in joints wnl , poor balance , and needs help with movements   psych: depressed , and tired   Allergies: Allergies    No Known Allergies    Intolerances        Home Medications:  acetaminophen 325 mg oral tablet: 2 tab(s) orally every 6 hours, As needed, pain (18 Dec 2018 11:38)  amLODIPine:  (18 Dec 2018 15:54)  amLODIPine 10 mg oral tablet: 1 tab(s) orally once a day (18 Dec 2018 11:38)  aspirin 81 mg oral delayed release tablet: 1 tab(s) orally once a day (18 Dec 2018 11:38)  famotidine 20 mg oral tablet: 1 tab(s) orally once a day (18 Dec 2018 11:38)  gabapentin 100 mg oral capsule:  orally once a day (at bedtime) (18 Dec 2018 11:38)  Lasix:  (18 Dec 2018 15:54)  metoprolol succinate 25 mg oral tablet, extended release:  orally once a day,hold for sbp below 100 and hr below 60 (18 Dec 2018 11:38)  mirtazapine:  (18 Dec 2018 15:54)  mirtazapine 15 mg oral tablet: 1 tab(s) orally once a day (at bedtime) (18 Dec 2018 11:38)  pravastatin 40 mg oral tablet: 1 tab(s) orally once a day (18 Dec 2018 11:38)  rOPINIRole:  (18 Dec 2018 15:54)  Synthroid 75 mcg (0.075 mg) oral tablet: 1 tab(s) orally once a day (18 Dec 2018 11:38)    Medications:   acetaminophen   Tablet .. 650 milliGRAM(s) Oral every 6 hours PRN  amLODIPine   Tablet 5 milliGRAM(s) Oral daily  aspirin enteric coated 81 milliGRAM(s) Oral daily  cefTRIAXone   IVPB 1 Gram(s) IV Intermittent every 24 hours  enoxaparin Injectable 40 milliGRAM(s) SubCutaneous daily  famotidine    Tablet 20 milliGRAM(s) Oral daily  furosemide    Tablet 20 milliGRAM(s) Oral daily  lactobacillus acidophilus 1 Tablet(s) Oral two times a day with meals  levothyroxine 75 MICROGram(s) Oral daily  metoprolol succinate ER 25 milliGRAM(s) Oral daily  mirtazapine 15 milliGRAM(s) Oral at bedtime  rOPINIRole 0.25 milliGRAM(s) Oral at bedtime      LABS:                        13.8   6.65  )-----------( 195      ( 18 Dec 2018 12:28 )             41.8     12-18    138  |  99  |  19  ----------------------------<  123<H>  3.9   |  33<H>  |  0.89    Ca    8.6      18 Dec 2018 12:28    TPro  8.3  /  Alb  3.9  /  TBili  0.4  /  DBili  x   /  AST  28  /  ALT  21  /  AlkPhos  109  12-18        Urinalysis Basic - ( 18 Dec 2018 12:28 )    Color: Yellow / Appearance: Slightly Turbid / S.010 / pH: x  Gluc: x / Ketone: Negative  / Bili: Negative / Urobili: Negative   Blood: x / Protein: 25 mg/dL / Nitrite: Positive   Leuk Esterase: Moderate / RBC: 0-2 /HPF / WBC >50   Sq Epi: x / Non Sq Epi: Few / Bacteria: Many        CAPILLARY BLOOD GLUCOSE      POCT Blood Glucose.: 127 mg/dL (18 Dec 2018 12:31)      CARDIAC MARKERS ( 18 Dec 2018 12:28 )  <.015 ng/mL / x     / x     / x     / x          RECENT CULTURES:

## 2018-12-19 NOTE — DISCHARGE NOTE ADULT - HOME CARE AGENCY
[facility] for your PT needs. [phone] Please call the number provided for [facility] [place] if you do not hear from the RN by 12 noon the day after discharge.

## 2018-12-20 DIAGNOSIS — F43.21 ADJUSTMENT DISORDER WITH DEPRESSED MOOD: ICD-10-CM

## 2018-12-20 PROCEDURE — 90792 PSYCH DIAG EVAL W/MED SRVCS: CPT

## 2018-12-20 RX ORDER — ONDANSETRON 8 MG/1
4 TABLET, FILM COATED ORAL ONCE
Qty: 0 | Refills: 0 | Status: COMPLETED | OUTPATIENT
Start: 2018-12-20 | End: 2018-12-20

## 2018-12-20 RX ORDER — VENLAFAXINE HCL 75 MG
37.5 CAPSULE, EXT RELEASE 24 HR ORAL
Qty: 0 | Refills: 0 | Status: DISCONTINUED | OUTPATIENT
Start: 2018-12-20 | End: 2018-12-21

## 2018-12-20 RX ADMIN — ROPINIROLE 0.25 MILLIGRAM(S): 8 TABLET, FILM COATED, EXTENDED RELEASE ORAL at 21:40

## 2018-12-20 RX ADMIN — Medication 1 TABLET(S): at 17:15

## 2018-12-20 RX ADMIN — Medication 1 TABLET(S): at 08:02

## 2018-12-20 RX ADMIN — Medication 75 MICROGRAM(S): at 05:50

## 2018-12-20 RX ADMIN — Medication 37.5 MILLIGRAM(S): at 17:15

## 2018-12-20 RX ADMIN — AMLODIPINE BESYLATE 5 MILLIGRAM(S): 2.5 TABLET ORAL at 05:51

## 2018-12-20 RX ADMIN — Medication 25 MILLIGRAM(S): at 05:50

## 2018-12-20 RX ADMIN — ENOXAPARIN SODIUM 40 MILLIGRAM(S): 100 INJECTION SUBCUTANEOUS at 11:42

## 2018-12-20 RX ADMIN — Medication 81 MILLIGRAM(S): at 11:42

## 2018-12-20 RX ADMIN — ONDANSETRON 4 MILLIGRAM(S): 8 TABLET, FILM COATED ORAL at 19:48

## 2018-12-20 RX ADMIN — FAMOTIDINE 20 MILLIGRAM(S): 10 INJECTION INTRAVENOUS at 11:42

## 2018-12-20 RX ADMIN — CEFTRIAXONE 100 GRAM(S): 500 INJECTION, POWDER, FOR SOLUTION INTRAMUSCULAR; INTRAVENOUS at 14:36

## 2018-12-20 RX ADMIN — Medication 20 MILLIGRAM(S): at 05:51

## 2018-12-20 RX ADMIN — MIRTAZAPINE 15 MILLIGRAM(S): 45 TABLET, ORALLY DISINTEGRATING ORAL at 21:40

## 2018-12-20 NOTE — CHART NOTE - NSCHARTNOTEFT_GEN_A_CORE
S: Called by RN that pt had 1 time of nonbilious vomiting and feel nausea; Pt seen and examed at bedside. Pt reported that after she finish her dinner, she dose not like the meat loft of her dinner and she vomit once. Denies of fever or chills, no abd pain/abd distention/CP/SOB/Dizziness/palpitation. Her last BM was yesterday. Only SHx of abd was hysterectomy years ago.            ROS as per HPI    O:  Allergies    No Known Allergies    Intolerances        Vitals  Vital Signs Last 24 Hrs  T(C): 36.6 (20 Dec 2018 13:04), Max: 36.8 (19 Dec 2018 21:18)  T(F): 97.9 (20 Dec 2018 13:04), Max: 98.2 (19 Dec 2018 21:18)  HR: 69 (20 Dec 2018 15:02) (65 - 69)  BP: 126/72 (20 Dec 2018 13:04) (126/72 - 169/81)  BP(mean): --  RR: 16 (20 Dec 2018 13:04) (16 - 19)  SpO2: 99% (20 Dec 2018 15:02) (93% - 99%)      General: WN/WD NAD  Respiratory: CTA B/L  CV: RRR, S1S2, no murmurs, rubs or gallops  Abdominal: Soft, NT, ND +BS,  Neurology: A&Ox3,  ADDISON x 4       LABS:                        12.9   4.95  )-----------( 195      ( 19 Dec 2018 09:31 )             39.2     12-19    140  |  102  |  15  ----------------------------<  196<H>  3.5   |  32<H>  |  0.90    Ca    8.7      19 Dec 2018 09:31                RADIOLOGY & ADDITIONAL TESTS:    A/P: 89 y.o F w/PMhx of HTN, hypothyroidism a/w fall now vomit once and felt nausea    -in the setting of benign exam, likely to be intolerance of food  -stat zofran 1 time  -continue monitor

## 2018-12-20 NOTE — PROGRESS NOTE ADULT - SUBJECTIVE AND OBJECTIVE BOX
pt  seen on rounds sitting in a  chair vss afebrile  lungs  clear  cor  regular rate abdomen  soft  extremities no  edema labs  stable  await  psych  eval and  pt evaluation  for placement home  with  home  health vs  rehab

## 2018-12-20 NOTE — BEHAVIORAL HEALTH ASSESSMENT NOTE - HPI (INCLUDE ILLNESS QUALITY, SEVERITY, DURATION, TIMING, CONTEXT, MODIFYING FACTORS, ASSOCIATED SIGNS AND SYMPTOMS)
Patient seen, evaluated and chart reviewed. Patient is a 89 y female accompanied to ED by daughter, son in law, daughter states patient has been "very tired" for 3 days, has had urinary frequency x several days, today patient walked to bathroom with her walker, daughter states she heard a thump, found patient on the floor, alert, and awake.  patient does recall falling, denies loc.  states her right knee "gave out", and that's why she fell, states "I have been tired these past few days", states she was not able to get up after fall. She was diagnosed with UTI and is currently treated with Rocephin.  Patient at this time reports that she has symptoms of depression, which worsened following her CVA episode. She reports worsened mood, sleep, interest, energy. She experiences psychomotor retardation and inability to follow suggestion of her doctors and daughter to drink more fluids and engage in physical effort.

## 2018-12-20 NOTE — PROGRESS NOTE ADULT - PROBLEM SELECTOR PROBLEM 4
Urinary tract infection associated with catheterization of urinary tract, unspecified indwelling urinary catheter type, subsequent encounter
Hypothyroidism, unspecified type

## 2018-12-21 VITALS
TEMPERATURE: 98 F | HEART RATE: 62 BPM | OXYGEN SATURATION: 94 % | DIASTOLIC BLOOD PRESSURE: 75 MMHG | SYSTOLIC BLOOD PRESSURE: 151 MMHG | RESPIRATION RATE: 16 BRPM

## 2018-12-21 PROCEDURE — 83036 HEMOGLOBIN GLYCOSYLATED A1C: CPT

## 2018-12-21 PROCEDURE — 80048 BASIC METABOLIC PNL TOTAL CA: CPT

## 2018-12-21 PROCEDURE — 87086 URINE CULTURE/COLONY COUNT: CPT

## 2018-12-21 PROCEDURE — 97110 THERAPEUTIC EXERCISES: CPT

## 2018-12-21 PROCEDURE — 73562 X-RAY EXAM OF KNEE 3: CPT

## 2018-12-21 PROCEDURE — G8978: CPT | Mod: CJ

## 2018-12-21 PROCEDURE — G8979: CPT | Mod: CJ

## 2018-12-21 PROCEDURE — 97162 PT EVAL MOD COMPLEX 30 MIN: CPT

## 2018-12-21 PROCEDURE — 82962 GLUCOSE BLOOD TEST: CPT

## 2018-12-21 PROCEDURE — 70450 CT HEAD/BRAIN W/O DYE: CPT

## 2018-12-21 PROCEDURE — 99285 EMERGENCY DEPT VISIT HI MDM: CPT | Mod: 25

## 2018-12-21 PROCEDURE — 71045 X-RAY EXAM CHEST 1 VIEW: CPT

## 2018-12-21 PROCEDURE — G8980: CPT | Mod: CJ

## 2018-12-21 PROCEDURE — 96365 THER/PROPH/DIAG IV INF INIT: CPT

## 2018-12-21 PROCEDURE — 80053 COMPREHEN METABOLIC PANEL: CPT

## 2018-12-21 PROCEDURE — 93005 ELECTROCARDIOGRAM TRACING: CPT

## 2018-12-21 PROCEDURE — 81001 URINALYSIS AUTO W/SCOPE: CPT

## 2018-12-21 PROCEDURE — 97116 GAIT TRAINING THERAPY: CPT

## 2018-12-21 PROCEDURE — 36415 COLL VENOUS BLD VENIPUNCTURE: CPT

## 2018-12-21 PROCEDURE — 87186 SC STD MICRODIL/AGAR DIL: CPT

## 2018-12-21 PROCEDURE — 84484 ASSAY OF TROPONIN QUANT: CPT

## 2018-12-21 PROCEDURE — 85027 COMPLETE CBC AUTOMATED: CPT

## 2018-12-21 RX ORDER — VENLAFAXINE HCL 75 MG
1 CAPSULE, EXT RELEASE 24 HR ORAL
Qty: 0 | Refills: 0 | DISCHARGE
Start: 2018-12-21

## 2018-12-21 RX ADMIN — Medication 20 MILLIGRAM(S): at 06:56

## 2018-12-21 RX ADMIN — Medication 1 TABLET(S): at 08:05

## 2018-12-21 RX ADMIN — Medication 25 MILLIGRAM(S): at 06:56

## 2018-12-21 RX ADMIN — AMLODIPINE BESYLATE 5 MILLIGRAM(S): 2.5 TABLET ORAL at 06:56

## 2018-12-21 RX ADMIN — ENOXAPARIN SODIUM 40 MILLIGRAM(S): 100 INJECTION SUBCUTANEOUS at 12:11

## 2018-12-21 RX ADMIN — FAMOTIDINE 20 MILLIGRAM(S): 10 INJECTION INTRAVENOUS at 12:11

## 2018-12-21 RX ADMIN — Medication 81 MILLIGRAM(S): at 12:11

## 2018-12-21 RX ADMIN — Medication 37.5 MILLIGRAM(S): at 08:05

## 2018-12-21 RX ADMIN — Medication 75 MICROGRAM(S): at 06:56

## 2018-12-21 NOTE — PROGRESS NOTE ADULT - REASON FOR ADMISSION
weakness  and dizzy and fall at home , mechanical

## 2018-12-21 NOTE — PROGRESS NOTE ADULT - PROBLEM SELECTOR PROBLEM 1
Fall, initial encounter
Urinary tract infection associated with catheterization of urinary tract, unspecified indwelling urinary catheter type, subsequent encounter
Fall, initial encounter

## 2018-12-21 NOTE — PROGRESS NOTE ADULT - SUBJECTIVE AND OBJECTIVE BOX
p[t  remains  depressed started on meds by  psych lungs  clear  cor  regular r rate abdomen  soft  extremies no  edema  await  psych  re eval  to determine  capacity so  pt  can  be  released to  rehab or home  with  services

## 2019-06-12 ENCOUNTER — EMERGENCY (EMERGENCY)
Facility: HOSPITAL | Age: 84
LOS: 1 days | Discharge: HOME | End: 2019-06-12
Attending: EMERGENCY MEDICINE | Admitting: EMERGENCY MEDICINE
Payer: MEDICARE

## 2019-06-12 VITALS
DIASTOLIC BLOOD PRESSURE: 66 MMHG | SYSTOLIC BLOOD PRESSURE: 124 MMHG | RESPIRATION RATE: 16 BRPM | HEART RATE: 66 BPM | OXYGEN SATURATION: 96 %

## 2019-06-12 VITALS
SYSTOLIC BLOOD PRESSURE: 136 MMHG | DIASTOLIC BLOOD PRESSURE: 77 MMHG | RESPIRATION RATE: 19 BRPM | HEART RATE: 59 BPM | TEMPERATURE: 98 F | OXYGEN SATURATION: 99 %

## 2019-06-12 PROBLEM — M79.2 NEURALGIA AND NEURITIS, UNSPECIFIED: Chronic | Status: ACTIVE | Noted: 2018-12-18

## 2019-06-12 LAB
ALBUMIN SERPL ELPH-MCNC: 3.6 G/DL — SIGNIFICANT CHANGE UP (ref 3.3–5)
ALP SERPL-CCNC: 76 U/L — SIGNIFICANT CHANGE UP (ref 40–120)
ALT FLD-CCNC: 17 U/L — SIGNIFICANT CHANGE UP (ref 12–78)
ANION GAP SERPL CALC-SCNC: 5 MMOL/L — SIGNIFICANT CHANGE UP (ref 5–17)
APPEARANCE UR: ABNORMAL
APTT BLD: 28 SEC — LOW (ref 28.5–37)
AST SERPL-CCNC: 26 U/L — SIGNIFICANT CHANGE UP (ref 15–37)
BASOPHILS # BLD AUTO: 0.04 K/UL — SIGNIFICANT CHANGE UP (ref 0–0.2)
BASOPHILS NFR BLD AUTO: 0.7 % — SIGNIFICANT CHANGE UP (ref 0–2)
BILIRUB SERPL-MCNC: 0.4 MG/DL — SIGNIFICANT CHANGE UP (ref 0.2–1.2)
BILIRUB UR-MCNC: NEGATIVE — SIGNIFICANT CHANGE UP
BUN SERPL-MCNC: 20 MG/DL — SIGNIFICANT CHANGE UP (ref 7–23)
CALCIUM SERPL-MCNC: 8.8 MG/DL — SIGNIFICANT CHANGE UP (ref 8.5–10.1)
CHLORIDE SERPL-SCNC: 102 MMOL/L — SIGNIFICANT CHANGE UP (ref 96–108)
CO2 SERPL-SCNC: 30 MMOL/L — SIGNIFICANT CHANGE UP (ref 22–31)
COLOR SPEC: YELLOW — SIGNIFICANT CHANGE UP
CREAT SERPL-MCNC: 1 MG/DL — SIGNIFICANT CHANGE UP (ref 0.5–1.3)
DIFF PNL FLD: ABNORMAL
EOSINOPHIL # BLD AUTO: 0.39 K/UL — SIGNIFICANT CHANGE UP (ref 0–0.5)
EOSINOPHIL NFR BLD AUTO: 7 % — HIGH (ref 0–6)
GLUCOSE SERPL-MCNC: 122 MG/DL — HIGH (ref 70–99)
GLUCOSE UR QL: NEGATIVE — SIGNIFICANT CHANGE UP
HCT VFR BLD CALC: 39.2 % — SIGNIFICANT CHANGE UP (ref 34.5–45)
HGB BLD-MCNC: 12.7 G/DL — SIGNIFICANT CHANGE UP (ref 11.5–15.5)
IMM GRANULOCYTES NFR BLD AUTO: 0.2 % — SIGNIFICANT CHANGE UP (ref 0–1.5)
INR BLD: 0.97 RATIO — SIGNIFICANT CHANGE UP (ref 0.88–1.16)
KETONES UR-MCNC: NEGATIVE — SIGNIFICANT CHANGE UP
LACTATE SERPL-SCNC: 1.4 MMOL/L — SIGNIFICANT CHANGE UP (ref 0.7–2)
LEUKOCYTE ESTERASE UR-ACNC: ABNORMAL
LIDOCAIN IGE QN: 272 U/L — SIGNIFICANT CHANGE UP (ref 73–393)
LYMPHOCYTES # BLD AUTO: 1.02 K/UL — SIGNIFICANT CHANGE UP (ref 1–3.3)
LYMPHOCYTES # BLD AUTO: 18.3 % — SIGNIFICANT CHANGE UP (ref 13–44)
MCHC RBC-ENTMCNC: 30 PG — SIGNIFICANT CHANGE UP (ref 27–34)
MCHC RBC-ENTMCNC: 32.4 GM/DL — SIGNIFICANT CHANGE UP (ref 32–36)
MCV RBC AUTO: 92.7 FL — SIGNIFICANT CHANGE UP (ref 80–100)
MONOCYTES # BLD AUTO: 0.37 K/UL — SIGNIFICANT CHANGE UP (ref 0–0.9)
MONOCYTES NFR BLD AUTO: 6.6 % — SIGNIFICANT CHANGE UP (ref 2–14)
NEUTROPHILS # BLD AUTO: 3.75 K/UL — SIGNIFICANT CHANGE UP (ref 1.8–7.4)
NEUTROPHILS NFR BLD AUTO: 67.2 % — SIGNIFICANT CHANGE UP (ref 43–77)
NITRITE UR-MCNC: NEGATIVE — SIGNIFICANT CHANGE UP
NRBC # BLD: 0 /100 WBCS — SIGNIFICANT CHANGE UP (ref 0–0)
NT-PROBNP SERPL-SCNC: 98 PG/ML — SIGNIFICANT CHANGE UP (ref 0–450)
PH UR: 8 — SIGNIFICANT CHANGE UP (ref 5–8)
PLATELET # BLD AUTO: 222 K/UL — SIGNIFICANT CHANGE UP (ref 150–400)
POTASSIUM SERPL-MCNC: 4.5 MMOL/L — SIGNIFICANT CHANGE UP (ref 3.5–5.3)
POTASSIUM SERPL-SCNC: 4.5 MMOL/L — SIGNIFICANT CHANGE UP (ref 3.5–5.3)
PROCALCITONIN SERPL-MCNC: <0.05 — SIGNIFICANT CHANGE UP (ref 0–0.04)
PROT SERPL-MCNC: 7.4 G/DL — SIGNIFICANT CHANGE UP (ref 6–8.3)
PROT UR-MCNC: 25 MG/DL
PROTHROM AB SERPL-ACNC: 11 SEC — SIGNIFICANT CHANGE UP (ref 10–12.9)
RBC # BLD: 4.23 M/UL — SIGNIFICANT CHANGE UP (ref 3.8–5.2)
RBC # FLD: 12.7 % — SIGNIFICANT CHANGE UP (ref 10.3–14.5)
SODIUM SERPL-SCNC: 137 MMOL/L — SIGNIFICANT CHANGE UP (ref 135–145)
SP GR SPEC: 1.01 — SIGNIFICANT CHANGE UP (ref 1.01–1.02)
TROPONIN I SERPL-MCNC: <.015 NG/ML — SIGNIFICANT CHANGE UP (ref 0.01–0.04)
TROPONIN I SERPL-MCNC: <.015 NG/ML — SIGNIFICANT CHANGE UP (ref 0.01–0.04)
UROBILINOGEN FLD QL: NEGATIVE — SIGNIFICANT CHANGE UP
WBC # BLD: 5.58 K/UL — SIGNIFICANT CHANGE UP (ref 3.8–10.5)
WBC # FLD AUTO: 5.58 K/UL — SIGNIFICANT CHANGE UP (ref 3.8–10.5)

## 2019-06-12 PROCEDURE — 81001 URINALYSIS AUTO W/SCOPE: CPT

## 2019-06-12 PROCEDURE — 85730 THROMBOPLASTIN TIME PARTIAL: CPT

## 2019-06-12 PROCEDURE — 71045 X-RAY EXAM CHEST 1 VIEW: CPT | Mod: 26

## 2019-06-12 PROCEDURE — 83605 ASSAY OF LACTIC ACID: CPT

## 2019-06-12 PROCEDURE — 70450 CT HEAD/BRAIN W/O DYE: CPT | Mod: 26

## 2019-06-12 PROCEDURE — 85610 PROTHROMBIN TIME: CPT

## 2019-06-12 PROCEDURE — 87086 URINE CULTURE/COLONY COUNT: CPT

## 2019-06-12 PROCEDURE — 36415 COLL VENOUS BLD VENIPUNCTURE: CPT

## 2019-06-12 PROCEDURE — 87040 BLOOD CULTURE FOR BACTERIA: CPT

## 2019-06-12 PROCEDURE — 83880 ASSAY OF NATRIURETIC PEPTIDE: CPT

## 2019-06-12 PROCEDURE — 93010 ELECTROCARDIOGRAM REPORT: CPT

## 2019-06-12 PROCEDURE — 85027 COMPLETE CBC AUTOMATED: CPT

## 2019-06-12 PROCEDURE — 93005 ELECTROCARDIOGRAM TRACING: CPT

## 2019-06-12 PROCEDURE — 71045 X-RAY EXAM CHEST 1 VIEW: CPT

## 2019-06-12 PROCEDURE — 83690 ASSAY OF LIPASE: CPT

## 2019-06-12 PROCEDURE — 99284 EMERGENCY DEPT VISIT MOD MDM: CPT

## 2019-06-12 PROCEDURE — 80053 COMPREHEN METABOLIC PANEL: CPT

## 2019-06-12 PROCEDURE — 84145 PROCALCITONIN (PCT): CPT

## 2019-06-12 PROCEDURE — 70450 CT HEAD/BRAIN W/O DYE: CPT

## 2019-06-12 PROCEDURE — 99284 EMERGENCY DEPT VISIT MOD MDM: CPT | Mod: 25

## 2019-06-12 PROCEDURE — 84484 ASSAY OF TROPONIN QUANT: CPT

## 2019-06-12 RX ORDER — SODIUM CHLORIDE 9 MG/ML
3 INJECTION INTRAMUSCULAR; INTRAVENOUS; SUBCUTANEOUS ONCE
Refills: 0 | Status: COMPLETED | OUTPATIENT
Start: 2019-06-12 | End: 2019-06-12

## 2019-06-12 RX ORDER — SODIUM CHLORIDE 9 MG/ML
500 INJECTION INTRAMUSCULAR; INTRAVENOUS; SUBCUTANEOUS ONCE
Refills: 0 | Status: COMPLETED | OUTPATIENT
Start: 2019-06-12 | End: 2019-06-12

## 2019-06-12 RX ORDER — ONDANSETRON 8 MG/1
4 TABLET, FILM COATED ORAL ONCE
Refills: 0 | Status: COMPLETED | OUTPATIENT
Start: 2019-06-12 | End: 2019-06-12

## 2019-06-12 RX ORDER — CEFUROXIME AXETIL 250 MG
500 TABLET ORAL ONCE
Refills: 0 | Status: COMPLETED | OUTPATIENT
Start: 2019-06-12 | End: 2019-06-12

## 2019-06-12 RX ORDER — CEFUROXIME AXETIL 250 MG
1 TABLET ORAL
Qty: 10 | Refills: 0
Start: 2019-06-12 | End: 2019-06-16

## 2019-06-12 RX ADMIN — SODIUM CHLORIDE 500 MILLILITER(S): 9 INJECTION INTRAMUSCULAR; INTRAVENOUS; SUBCUTANEOUS at 14:33

## 2019-06-12 RX ADMIN — SODIUM CHLORIDE 3 MILLILITER(S): 9 INJECTION INTRAMUSCULAR; INTRAVENOUS; SUBCUTANEOUS at 14:33

## 2019-06-12 RX ADMIN — SODIUM CHLORIDE 500 MILLILITER(S): 9 INJECTION INTRAMUSCULAR; INTRAVENOUS; SUBCUTANEOUS at 15:00

## 2019-06-12 RX ADMIN — Medication 500 MILLIGRAM(S): at 18:45

## 2019-06-12 NOTE — ED PROVIDER NOTE - CARE PROVIDER_API CALL
Gerardo Krishna)  Internal Medicine  [address], [address]  [place] [address]  Phone: [phone]  Fax: [phone]  Follow Up Time:

## 2019-06-12 NOTE — ED ADULT NURSE NOTE - NSIMPLEMENTINTERV_GEN_ALL_ED
Implemented All Fall with Harm Risk Interventions:  [place] to call system. Call bell, personal items and telephone within reach. Instruct patient to call for assistance. Room bathroom lighting operational. Non-slip footwear when patient is off stretcher. Physically safe environment: no spills, clutter or unnecessary equipment. Stretcher in lowest position, wheels locked, appropriate side rails in place. Provide visual cue, wrist band, yellow gown, etc. Monitor gait and stability. Monitor for mental status changes and reorient to person, place, and time. Review medications for side effects contributing to fall risk. Reinforce activity limits and safety measures with patient and family. Provide visual clues: red socks.

## 2019-06-12 NOTE — ED PROVIDER NOTE - PROGRESS NOTE DETAILS
Spoke with Dr. Pike.  Believes that pt was likely bradycardic secondary to prolonged vomiting episode.  Currently symptom free.  Feels pt can be discharge home with follow up in the office tomorrow.  Reviewed with family who is in agreement with plan

## 2019-06-12 NOTE — ED PROVIDER NOTE - CLINICAL SUMMARY MEDICAL DECISION MAKING FREE TEXT BOX
Pt is a 89 yo female who presents to the ED with a cc of bradycardia.  PMHx of HLD, HTN, hypothyroidism, h/o nerve pain, depression, h/o CVA. Pt was last admitted to the hospital from 12/18-12/21 for weakness, and lightheadedness.  Pt with AMS, hypotension, and bradycardia after prolonged vomiting episode.  Likely related to vagal episode as symptoms have completely resolved.  Will obtain screening labs, EKG, CT head.  Will monitor pt

## 2019-06-12 NOTE — ED ADULT TRIAGE NOTE - CHIEF COMPLAINT QUOTE
pt from home, c/o weakness, nausea, diaphoresis, altered ms, on ems arrival pt hypotensive, bradycardiac, # 18 left AC placed and NS bolus started, atropine half amp given

## 2019-06-12 NOTE — ED PROVIDER NOTE - NSFOLLOWUPINSTRUCTIONS_ED_ALL_ED_FT
Return to the ED for any new or worsening symptoms  Take your medication as prescribed  Ceftin 1 tab 2 times a day   Follow up in the morning with your PMD   Advance activity as tolerated

## 2019-06-12 NOTE — ED PROVIDER NOTE - NEUROLOGICAL, MLM
Alert and oriented, no focal deficits, or sensory deficits. Pt moving all ext with 4/5 motor strength to RLE chronic from prior injury.  NVI

## 2019-06-12 NOTE — ED PROVIDER NOTE - CHIEF COMPLAINT
The patient is a 89y Female complaining of hypotension. The patient is a 89y Female complaining of bradycardia

## 2019-06-12 NOTE — ED PROVIDER NOTE - OBJECTIVE STATEMENT
Pt is a 89 yo female who presents to the ED with a cc of bradycardia.  PMHx of HLD, HTN, hypothyroidism, h/o nerve pain, depression, h/o CVA. Pt was last admitted to the hospital from 12/18-12/21 for weakness, and lightheadedness.  She was found to have a UTI at that time and was treated and discharged.  Today pt was in her normal state of health.  She was eating breakfast around 11 am at the table with her daughter.  Shortly after finishing pt developed sudden onset of nausea and then had intractable vomiting for approx 1 hour.  Near the end of the attack pt broke out in a cold sweat and became lightheaded.  Daughter reports that pt eyes were open but when she tried to speak with her the words were slurred and she could not make them out.  She became concerned and called EMS.  On EMS arrival pt was found to be bradycardic with a HR in the 40s and hypotensive although pt BP was not communicated.  She was given 1/2 an amp of Atropine with resolution of symptoms.  On arrival pt AAOx3.  Denies all complaints and is resting comfortably.  Denies HA, visual changes, N/V, CP, SOB, abd pain, ext numbness.  Daughter does report that they just began Venlafaxine yesterday.  Pt had been previously on this and developed abdominal upset.  The medication was stopped for sometime and resumed yesterday at a lower dose.  Daughter also reports that over the last several months pt has lost approx 10 pounds.  She has been to her PMD for this but work up relieved no acute findings.

## 2019-06-12 NOTE — ED ADULT NURSE NOTE - OBJECTIVE STATEMENT
Pt. received alert and oriented x3 with chief complaint of multiple episodes of N/V prior to arrival. As per EMS patient presents hypotensive and bradycardic. Pt. given 0.5mg of atropine by EMS prior to arrival. Pt. placed on continuous cardiac monitor with continuous pulse ox. EKG performed at bedside upon arrival. Pt. presents w/ stage 2 pressure ulcer to inner bilateral gluteal and stage 1 pressure ulcer to sacral area.

## 2019-06-13 LAB
CULTURE RESULTS: SIGNIFICANT CHANGE UP
SPECIMEN SOURCE: SIGNIFICANT CHANGE UP

## 2019-06-17 LAB
CULTURE RESULTS: SIGNIFICANT CHANGE UP
CULTURE RESULTS: SIGNIFICANT CHANGE UP
SPECIMEN SOURCE: SIGNIFICANT CHANGE UP
SPECIMEN SOURCE: SIGNIFICANT CHANGE UP

## 2019-11-12 NOTE — PHYSICAL THERAPY INITIAL EVALUATION ADULT - LEVEL OF INDEPENDENCE: SIT/STAND, REHAB EVAL
Quality 130: Documentation Of Current Medications In The Medical Record: Current Medications Documented Quality 131: Pain Assessment And Follow-Up: Pain assessment using a standardized tool is documented as negative, no follow-up plan required Detail Level: Simple Additional Notes: Patient states on a scale of 0-10 pain level is 0. contact guard

## 2020-02-06 NOTE — H&P ADULT. - NO PERTINENT FAMILY HISTORY
2/6/2020    1963  Jennyfer Sandoval  [address] [place] [address]    To Whom It May Concern:    This is to certify that Jennyfer Sandoval was evaluated in the Urgent Care on 2/6/2020 and can return to regular work on Monday February 10, 2020.            Mercy Olson DO    [facility] [address] [facility] [address] [facility]  [address] [facility] [address]  [phone]    
<<----- Click to add NO pertinent Family History

## 2020-10-10 NOTE — ED PROVIDER NOTE - CADM POA URETHRAL CATHETER
No Implemented All Universal Safety Interventions:  [place] to call system. Call bell, personal items and telephone within reach. Instruct patient to call for assistance. Room bathroom lighting operational. Non-slip footwear when patient is off stretcher. Physically safe environment: no spills, clutter or unnecessary equipment. Stretcher in lowest position, wheels locked, appropriate side rails in place.

## 2021-01-10 ENCOUNTER — INPATIENT (INPATIENT)
Facility: HOSPITAL | Age: 86
LOS: 4 days | Discharge: HOME | DRG: 872 | End: 2021-01-15
Attending: INTERNAL MEDICINE | Admitting: INTERNAL MEDICINE
Payer: MEDICARE

## 2021-01-10 VITALS — WEIGHT: 130.07 LBS | HEIGHT: 64 IN

## 2021-01-10 DIAGNOSIS — K52.9 NONINFECTIVE GASTROENTERITIS AND COLITIS, UNSPECIFIED: ICD-10-CM

## 2021-01-10 DIAGNOSIS — E03.9 HYPOTHYROIDISM, UNSPECIFIED: ICD-10-CM

## 2021-01-10 DIAGNOSIS — M79.2 NEURALGIA AND NEURITIS, UNSPECIFIED: ICD-10-CM

## 2021-01-10 DIAGNOSIS — I10 ESSENTIAL (PRIMARY) HYPERTENSION: ICD-10-CM

## 2021-01-10 DIAGNOSIS — E78.5 HYPERLIPIDEMIA, UNSPECIFIED: ICD-10-CM

## 2021-01-10 LAB
ALBUMIN SERPL ELPH-MCNC: 2.9 G/DL — LOW (ref 3.3–5)
ALP SERPL-CCNC: 81 U/L — SIGNIFICANT CHANGE UP (ref 40–120)
ALT FLD-CCNC: 18 U/L — SIGNIFICANT CHANGE UP (ref 12–78)
ANION GAP SERPL CALC-SCNC: 8 MMOL/L — SIGNIFICANT CHANGE UP (ref 5–17)
APPEARANCE UR: CLEAR — SIGNIFICANT CHANGE UP
APTT BLD: 26.7 SEC — LOW (ref 27.5–35.5)
AST SERPL-CCNC: 21 U/L — SIGNIFICANT CHANGE UP (ref 15–37)
BACTERIA # UR AUTO: ABNORMAL
BASOPHILS # BLD AUTO: 0.04 K/UL — SIGNIFICANT CHANGE UP (ref 0–0.2)
BASOPHILS NFR BLD AUTO: 0.4 % — SIGNIFICANT CHANGE UP (ref 0–2)
BILIRUB SERPL-MCNC: 0.5 MG/DL — SIGNIFICANT CHANGE UP (ref 0.2–1.2)
BILIRUB UR-MCNC: NEGATIVE — SIGNIFICANT CHANGE UP
BUN SERPL-MCNC: 19 MG/DL — SIGNIFICANT CHANGE UP (ref 7–23)
CALCIUM SERPL-MCNC: 8.7 MG/DL — SIGNIFICANT CHANGE UP (ref 8.5–10.1)
CHLORIDE SERPL-SCNC: 100 MMOL/L — SIGNIFICANT CHANGE UP (ref 96–108)
CK MB BLD-MCNC: 1.3 % — SIGNIFICANT CHANGE UP (ref 0–3.5)
CK MB CFR SERPL CALC: 1.5 NG/ML — SIGNIFICANT CHANGE UP (ref 0–3.6)
CK SERPL-CCNC: 118 U/L — SIGNIFICANT CHANGE UP (ref 26–192)
CO2 SERPL-SCNC: 30 MMOL/L — SIGNIFICANT CHANGE UP (ref 22–31)
COLOR SPEC: YELLOW — SIGNIFICANT CHANGE UP
CREAT SERPL-MCNC: 0.99 MG/DL — SIGNIFICANT CHANGE UP (ref 0.5–1.3)
DIFF PNL FLD: ABNORMAL
EOSINOPHIL # BLD AUTO: 0.21 K/UL — SIGNIFICANT CHANGE UP (ref 0–0.5)
EOSINOPHIL NFR BLD AUTO: 2.1 % — SIGNIFICANT CHANGE UP (ref 0–6)
EPI CELLS # UR: ABNORMAL
GLUCOSE SERPL-MCNC: 128 MG/DL — HIGH (ref 70–99)
GLUCOSE UR QL: NEGATIVE — SIGNIFICANT CHANGE UP
HCT VFR BLD CALC: 37.7 % — SIGNIFICANT CHANGE UP (ref 34.5–45)
HGB BLD-MCNC: 12.2 G/DL — SIGNIFICANT CHANGE UP (ref 11.5–15.5)
IMM GRANULOCYTES NFR BLD AUTO: 0.4 % — SIGNIFICANT CHANGE UP (ref 0–1.5)
INR BLD: 1.01 RATIO — SIGNIFICANT CHANGE UP (ref 0.88–1.16)
KETONES UR-MCNC: NEGATIVE — SIGNIFICANT CHANGE UP
LACTATE SERPL-SCNC: 2.3 MMOL/L — HIGH (ref 0.7–2)
LACTATE SERPL-SCNC: 2.8 MMOL/L — HIGH (ref 0.7–2)
LEUKOCYTE ESTERASE UR-ACNC: ABNORMAL
LIDOCAIN IGE QN: 223 U/L — SIGNIFICANT CHANGE UP (ref 73–393)
LYMPHOCYTES # BLD AUTO: 1.64 K/UL — SIGNIFICANT CHANGE UP (ref 1–3.3)
LYMPHOCYTES # BLD AUTO: 16.2 % — SIGNIFICANT CHANGE UP (ref 13–44)
MCHC RBC-ENTMCNC: 29.8 PG — SIGNIFICANT CHANGE UP (ref 27–34)
MCHC RBC-ENTMCNC: 32.4 GM/DL — SIGNIFICANT CHANGE UP (ref 32–36)
MCV RBC AUTO: 92 FL — SIGNIFICANT CHANGE UP (ref 80–100)
MONOCYTES # BLD AUTO: 0.43 K/UL — SIGNIFICANT CHANGE UP (ref 0–0.9)
MONOCYTES NFR BLD AUTO: 4.2 % — SIGNIFICANT CHANGE UP (ref 2–14)
NEUTROPHILS # BLD AUTO: 7.77 K/UL — HIGH (ref 1.8–7.4)
NEUTROPHILS NFR BLD AUTO: 76.7 % — SIGNIFICANT CHANGE UP (ref 43–77)
NITRITE UR-MCNC: NEGATIVE — SIGNIFICANT CHANGE UP
NRBC # BLD: 0 /100 WBCS — SIGNIFICANT CHANGE UP (ref 0–0)
PH UR: 7 — SIGNIFICANT CHANGE UP (ref 5–8)
PLATELET # BLD AUTO: 287 K/UL — SIGNIFICANT CHANGE UP (ref 150–400)
POTASSIUM SERPL-MCNC: 4.1 MMOL/L — SIGNIFICANT CHANGE UP (ref 3.5–5.3)
POTASSIUM SERPL-SCNC: 4.1 MMOL/L — SIGNIFICANT CHANGE UP (ref 3.5–5.3)
PROT SERPL-MCNC: 6.5 G/DL — SIGNIFICANT CHANGE UP (ref 6–8.3)
PROT UR-MCNC: NEGATIVE — SIGNIFICANT CHANGE UP
PROTHROM AB SERPL-ACNC: 11.8 SEC — SIGNIFICANT CHANGE UP (ref 10.6–13.6)
RBC # BLD: 4.1 M/UL — SIGNIFICANT CHANGE UP (ref 3.8–5.2)
RBC # FLD: 12.4 % — SIGNIFICANT CHANGE UP (ref 10.3–14.5)
RBC CASTS # UR COMP ASSIST: SIGNIFICANT CHANGE UP /HPF (ref 0–4)
SARS-COV-2 RNA SPEC QL NAA+PROBE: SIGNIFICANT CHANGE UP
SODIUM SERPL-SCNC: 138 MMOL/L — SIGNIFICANT CHANGE UP (ref 135–145)
SP GR SPEC: 1.01 — SIGNIFICANT CHANGE UP (ref 1.01–1.02)
TROPONIN I SERPL-MCNC: <.015 NG/ML — SIGNIFICANT CHANGE UP (ref 0.01–0.04)
UROBILINOGEN FLD QL: NEGATIVE — SIGNIFICANT CHANGE UP
WBC # BLD: 10.13 K/UL — SIGNIFICANT CHANGE UP (ref 3.8–10.5)
WBC # FLD AUTO: 10.13 K/UL — SIGNIFICANT CHANGE UP (ref 3.8–10.5)
WBC UR QL: SIGNIFICANT CHANGE UP

## 2021-01-10 PROCEDURE — 71045 X-RAY EXAM CHEST 1 VIEW: CPT | Mod: 26

## 2021-01-10 PROCEDURE — 74176 CT ABD & PELVIS W/O CONTRAST: CPT | Mod: 26

## 2021-01-10 PROCEDURE — 99285 EMERGENCY DEPT VISIT HI MDM: CPT

## 2021-01-10 PROCEDURE — 70450 CT HEAD/BRAIN W/O DYE: CPT | Mod: 26

## 2021-01-10 PROCEDURE — 93010 ELECTROCARDIOGRAM REPORT: CPT

## 2021-01-10 RX ORDER — ENOXAPARIN SODIUM 100 MG/ML
40 INJECTION SUBCUTANEOUS DAILY
Refills: 0 | Status: DISCONTINUED | OUTPATIENT
Start: 2021-01-10 | End: 2021-01-15

## 2021-01-10 RX ORDER — PIPERACILLIN AND TAZOBACTAM 4; .5 G/20ML; G/20ML
3.38 INJECTION, POWDER, LYOPHILIZED, FOR SOLUTION INTRAVENOUS ONCE
Refills: 0 | Status: COMPLETED | OUTPATIENT
Start: 2021-01-10 | End: 2021-01-10

## 2021-01-10 RX ORDER — METOPROLOL TARTRATE 50 MG
25 TABLET ORAL DAILY
Refills: 0 | Status: DISCONTINUED | OUTPATIENT
Start: 2021-01-10 | End: 2021-01-15

## 2021-01-10 RX ORDER — ACETAMINOPHEN 500 MG
650 TABLET ORAL EVERY 6 HOURS
Refills: 0 | Status: DISCONTINUED | OUTPATIENT
Start: 2021-01-10 | End: 2021-01-15

## 2021-01-10 RX ORDER — ASPIRIN/CALCIUM CARB/MAGNESIUM 324 MG
81 TABLET ORAL DAILY
Refills: 0 | Status: DISCONTINUED | OUTPATIENT
Start: 2021-01-10 | End: 2021-01-15

## 2021-01-10 RX ORDER — FAMOTIDINE 10 MG/ML
20 INJECTION INTRAVENOUS DAILY
Refills: 0 | Status: DISCONTINUED | OUTPATIENT
Start: 2021-01-10 | End: 2021-01-15

## 2021-01-10 RX ORDER — LEVOTHYROXINE SODIUM 125 MCG
75 TABLET ORAL DAILY
Refills: 0 | Status: DISCONTINUED | OUTPATIENT
Start: 2021-01-10 | End: 2021-01-15

## 2021-01-10 RX ORDER — SODIUM CHLORIDE 9 MG/ML
1000 INJECTION INTRAMUSCULAR; INTRAVENOUS; SUBCUTANEOUS ONCE
Refills: 0 | Status: COMPLETED | OUTPATIENT
Start: 2021-01-10 | End: 2021-01-10

## 2021-01-10 RX ORDER — ONDANSETRON 8 MG/1
4 TABLET, FILM COATED ORAL ONCE
Refills: 0 | Status: COMPLETED | OUTPATIENT
Start: 2021-01-10 | End: 2021-01-10

## 2021-01-10 RX ADMIN — SODIUM CHLORIDE 2000 MILLILITER(S): 9 INJECTION INTRAMUSCULAR; INTRAVENOUS; SUBCUTANEOUS at 17:30

## 2021-01-10 RX ADMIN — SODIUM CHLORIDE 1000 MILLILITER(S): 9 INJECTION INTRAMUSCULAR; INTRAVENOUS; SUBCUTANEOUS at 18:30

## 2021-01-10 RX ADMIN — ONDANSETRON 4 MILLIGRAM(S): 8 TABLET, FILM COATED ORAL at 17:10

## 2021-01-10 RX ADMIN — SODIUM CHLORIDE 1000 MILLILITER(S): 9 INJECTION INTRAMUSCULAR; INTRAVENOUS; SUBCUTANEOUS at 18:40

## 2021-01-10 RX ADMIN — PIPERACILLIN AND TAZOBACTAM 3.38 GRAM(S): 4; .5 INJECTION, POWDER, LYOPHILIZED, FOR SOLUTION INTRAVENOUS at 18:00

## 2021-01-10 RX ADMIN — PIPERACILLIN AND TAZOBACTAM 200 GRAM(S): 4; .5 INJECTION, POWDER, LYOPHILIZED, FOR SOLUTION INTRAVENOUS at 17:30

## 2021-01-10 RX ADMIN — SODIUM CHLORIDE 1000 MILLILITER(S): 9 INJECTION INTRAMUSCULAR; INTRAVENOUS; SUBCUTANEOUS at 19:40

## 2021-01-10 NOTE — ED PROVIDER NOTE - CLINICAL SUMMARY MEDICAL DECISION MAKING FREE TEXT BOX
pt pw abd pain , sp large bn, vomiting, low bp, abd pain, labs ok except slight lactate elevation. pt improved and icu no longer needed,  ct abd with colitis and pt moving bowels in ed. ivf, abx, admit, gi consult in am

## 2021-01-10 NOTE — H&P ADULT - ASSESSMENT
Chief Complaint: abdominal pain and syncope.    · Chief Complaint: The patient is a 89y Female complaining of abdominal pain.  · Unable to Obtain: Severe Illness/Injury  · Details: per ems, limited per patient  · HPI Objective Statement: pt 89 yo female well until yesterday. pt brought from home after co abd pain, syncope and need to have bm, per ems pt with large bm, syncope no trauma, pt co abd pain now and with n/v in triage, no other hx obtained          PAST MEDICAL/SURGICAL/FAMILY/SOCIAL HISTORY:    Past Medical History:  Hyperlipidemia    Hypertension    Hypothyroidism    Nerve pain.     Past Surgical History:  No significant past surgical history.      admit for ro diverticulitis , sepsis , , place on iv abx , dvt prophylaxis and Gi prophylax , resume other meds ,

## 2021-01-10 NOTE — H&P ADULT - NSHPLABSRESULTS_GEN_ALL_CORE
< from: CT Abdomen and Pelvis No Cont (01.10.21 @ 19:56) >      EXAM:  CT ABDOMEN AND PELVIS                            PROCEDURE DATE:  01/10/2021          INTERPRETATION:  CLINICAL INFORMATION: Abdominal pain, nausea and vomiting. Evaluate for obstruction.    COMPARISON: None.    PROCEDURE:  CT of the Abdomenand Pelvis was performed without intravenous contrast.  Intravenous contrast: None.  Oral contrast: None.  Sagittal and coronal reformats were performed.      FINDINGS:  Evaluation of solid organs is limited without intravenous contrast.    LOWER CHEST: Mild bibasilar atelectasis. Punctate left lower lobe calcified granuloma. Coronary atherosclerosis.    LIVER: Within normal limits.  BILE DUCTS: Normal caliber.  GALLBLADDER: Within normal limits.  SPLEEN: Within normal limits.  PANCREAS: Within normal limits.  ADRENALS: Within normal limits.  KIDNEYS/URETERS: Mild right bilateral hydronephrosis. No obstructing stone identified.    BLADDER: Distended.  REPRODUCTIVE ORGANS: Hysterectomy. 2.5 cm right adnexal cyst.    BOWEL: No bowel obstruction. Marked distention of the rectum with stool. Colonic diverticulosis. Colonic wall thickening of the distal transverse colon and the descending colon with adjacent pericolonic inflammation. Appendix not definitely identified.  PERITONEUM: Trace ascites.  VESSELS: Within normal limits.  RETROPERITONEUM/LYMPH NODES: No lymphadenopathy.  ABDOMINAL WALL: Within normal limits.  BONES: Within normal limits.    IMPRESSION:  Limited exam without contrast.    1. Findings compatible with colitis.    2. Marked distention of the rectum with stool.    3. Mild bilateral hydronephrosis without obstructing stone identified. Recommend correlation with urinalysis.    4. 2.5 cm right adnexal cyst. Nonurgent ultrasound is recommended for further evaluation.              CARLO NUNEZ MD; Attending Radiologist    < end of copied text >

## 2021-01-10 NOTE — ED PROVIDER NOTE - CADM POA URETHRAL CATHETER
----- Message from Luz Burgess sent at 1/27/2017  2:55 PM CST -----  Contact: pt mom #[phone]  Mom would like to know is pt note ready for ?  
Letter at the  for pickup.  Left voicemail for pt's mother that letter is ready for pickup and to contact clinic with any questions or concerns.  
Mother states letter was discussed at appointment on 1/23/17 regarding IEP meeting and behavioral issues mentioned at visit.  Please generate letter and advise when ready for .  Mother aware  back in office on 1/30/17.  
No

## 2021-01-10 NOTE — ED ADULT TRIAGE NOTE - CHIEF COMPLAINT QUOTE
pt from home, ems called for weakness, ems stands her off toilet, very large bowel movement as per ems, pt then had witnessed syncopal episode, hypotensive, pt now c/o abd pain, pt pale, lethargic, vomiting, unable to obtain b/p, pt answering questions, hands cold, dnr in place

## 2021-01-10 NOTE — H&P ADULT - HISTORY OF PRESENT ILLNESS
Chief Complaint: abdominal pain and syncope.    · Chief Complaint: The patient is a 89y Female complaining of abdominal pain.  · Unable to Obtain: Severe Illness/Injury  · Details: per ems, limited per patient  · HPI Objective Statement: pt 89 yo female well until yesterday. pt brought from home after co abd pain, syncope and need to have bm, per ems pt with large bm, syncope no trauma, pt co abd pain now and with n/v in triage, no other hx obtained          PAST MEDICAL/SURGICAL/FAMILY/SOCIAL HISTORY:    Past Medical History:  Hyperlipidemia    Hypertension    Hypothyroidism    Nerve pain.     Past Surgical History:  No significant past surgical history.

## 2021-01-10 NOTE — ED PROVIDER NOTE - CONSTITUTIONAL, MLM
normal... acutely ill, frail, lethargic but answers questions, alert, oriented to person, no resp distress

## 2021-01-10 NOTE — ED PROVIDER NOTE - OBJECTIVE STATEMENT
pt 89 yo female well until yesterday. pt brought from home after co abd pain, syncope and need to have bm, per ems pt with large bm, syncope no trauma, pt co abd pain now and with n/v in triage, no other hx obtained    pmd: emilie

## 2021-01-10 NOTE — ED ADULT NURSE REASSESSMENT NOTE - NS ED NURSE REASSESS COMMENT FT1
received pt in stretcher, pt alert and oriented x3, pt had BM, pt being cleaned and repositioned at this time, awaiting radiology.

## 2021-01-11 LAB
ALBUMIN SERPL ELPH-MCNC: 2.4 G/DL — LOW (ref 3.3–5)
ALP SERPL-CCNC: 66 U/L — SIGNIFICANT CHANGE UP (ref 40–120)
ALT FLD-CCNC: 15 U/L — SIGNIFICANT CHANGE UP (ref 12–78)
ANION GAP SERPL CALC-SCNC: 6 MMOL/L — SIGNIFICANT CHANGE UP (ref 5–17)
AST SERPL-CCNC: 24 U/L — SIGNIFICANT CHANGE UP (ref 15–37)
BASOPHILS # BLD AUTO: 0 K/UL — SIGNIFICANT CHANGE UP (ref 0–0.2)
BASOPHILS NFR BLD AUTO: 0 % — SIGNIFICANT CHANGE UP (ref 0–2)
BILIRUB SERPL-MCNC: 0.4 MG/DL — SIGNIFICANT CHANGE UP (ref 0.2–1.2)
BUN SERPL-MCNC: 21 MG/DL — SIGNIFICANT CHANGE UP (ref 7–23)
CALCIUM SERPL-MCNC: 7.8 MG/DL — LOW (ref 8.5–10.1)
CHLORIDE SERPL-SCNC: 105 MMOL/L — SIGNIFICANT CHANGE UP (ref 96–108)
CO2 SERPL-SCNC: 29 MMOL/L — SIGNIFICANT CHANGE UP (ref 22–31)
CREAT SERPL-MCNC: 1.2 MG/DL — SIGNIFICANT CHANGE UP (ref 0.5–1.3)
CULTURE RESULTS: NO GROWTH — SIGNIFICANT CHANGE UP
EOSINOPHIL # BLD AUTO: 0 K/UL — SIGNIFICANT CHANGE UP (ref 0–0.5)
EOSINOPHIL NFR BLD AUTO: 0 % — SIGNIFICANT CHANGE UP (ref 0–6)
GLUCOSE SERPL-MCNC: 149 MG/DL — HIGH (ref 70–99)
HCT VFR BLD CALC: 33.6 % — LOW (ref 34.5–45)
HGB BLD-MCNC: 10.6 G/DL — LOW (ref 11.5–15.5)
LYMPHOCYTES # BLD AUTO: 0.68 K/UL — LOW (ref 1–3.3)
LYMPHOCYTES # BLD AUTO: 5 % — LOW (ref 13–44)
MANUAL SMEAR VERIFICATION: SIGNIFICANT CHANGE UP
MCHC RBC-ENTMCNC: 29.3 PG — SIGNIFICANT CHANGE UP (ref 27–34)
MCHC RBC-ENTMCNC: 31.5 GM/DL — LOW (ref 32–36)
MCV RBC AUTO: 92.8 FL — SIGNIFICANT CHANGE UP (ref 80–100)
MONOCYTES # BLD AUTO: 0.68 K/UL — SIGNIFICANT CHANGE UP (ref 0–0.9)
MONOCYTES NFR BLD AUTO: 5 % — SIGNIFICANT CHANGE UP (ref 2–14)
NEUTROPHILS # BLD AUTO: 12.2 K/UL — HIGH (ref 1.8–7.4)
NEUTROPHILS NFR BLD AUTO: 82 % — HIGH (ref 43–77)
NEUTS BAND # BLD: 8 % — SIGNIFICANT CHANGE UP (ref 0–8)
NRBC # BLD: 0 — SIGNIFICANT CHANGE UP
NRBC # BLD: SIGNIFICANT CHANGE UP /100 WBCS (ref 0–0)
PLAT MORPH BLD: NORMAL — SIGNIFICANT CHANGE UP
PLATELET # BLD AUTO: 208 K/UL — SIGNIFICANT CHANGE UP (ref 150–400)
POTASSIUM SERPL-MCNC: 4.1 MMOL/L — SIGNIFICANT CHANGE UP (ref 3.5–5.3)
POTASSIUM SERPL-SCNC: 4.1 MMOL/L — SIGNIFICANT CHANGE UP (ref 3.5–5.3)
PROT SERPL-MCNC: 5.6 G/DL — LOW (ref 6–8.3)
RBC # BLD: 3.62 M/UL — LOW (ref 3.8–5.2)
RBC # FLD: 12.7 % — SIGNIFICANT CHANGE UP (ref 10.3–14.5)
RBC BLD AUTO: SIGNIFICANT CHANGE UP
SARS-COV-2 IGG SERPL QL IA: NEGATIVE — SIGNIFICANT CHANGE UP
SARS-COV-2 IGM SERPL IA-ACNC: <0.1 INDEX — SIGNIFICANT CHANGE UP
SODIUM SERPL-SCNC: 140 MMOL/L — SIGNIFICANT CHANGE UP (ref 135–145)
SPECIMEN SOURCE: SIGNIFICANT CHANGE UP
WBC # BLD: 13.56 K/UL — HIGH (ref 3.8–10.5)
WBC # FLD AUTO: 13.56 K/UL — HIGH (ref 3.8–10.5)

## 2021-01-11 RX ORDER — LACTOBACILLUS ACIDOPHILUS 100MM CELL
1 CAPSULE ORAL
Refills: 0 | Status: DISCONTINUED | OUTPATIENT
Start: 2021-01-11 | End: 2021-01-15

## 2021-01-11 RX ORDER — SODIUM CHLORIDE 9 MG/ML
1000 INJECTION INTRAMUSCULAR; INTRAVENOUS; SUBCUTANEOUS ONCE
Refills: 0 | Status: COMPLETED | OUTPATIENT
Start: 2021-01-11 | End: 2021-01-11

## 2021-01-11 RX ORDER — PIPERACILLIN AND TAZOBACTAM 4; .5 G/20ML; G/20ML
3.38 INJECTION, POWDER, LYOPHILIZED, FOR SOLUTION INTRAVENOUS EVERY 8 HOURS
Refills: 0 | Status: DISCONTINUED | OUTPATIENT
Start: 2021-01-11 | End: 2021-01-15

## 2021-01-11 RX ADMIN — FAMOTIDINE 20 MILLIGRAM(S): 10 INJECTION INTRAVENOUS at 12:18

## 2021-01-11 RX ADMIN — Medication 650 MILLIGRAM(S): at 01:25

## 2021-01-11 RX ADMIN — Medication 650 MILLIGRAM(S): at 22:52

## 2021-01-11 RX ADMIN — ENOXAPARIN SODIUM 40 MILLIGRAM(S): 100 INJECTION SUBCUTANEOUS at 12:18

## 2021-01-11 RX ADMIN — Medication 1 TABLET(S): at 18:59

## 2021-01-11 RX ADMIN — Medication 75 MICROGRAM(S): at 05:44

## 2021-01-11 RX ADMIN — Medication 81 MILLIGRAM(S): at 12:18

## 2021-01-11 RX ADMIN — PIPERACILLIN AND TAZOBACTAM 25 GRAM(S): 4; .5 INJECTION, POWDER, LYOPHILIZED, FOR SOLUTION INTRAVENOUS at 22:52

## 2021-01-11 RX ADMIN — Medication 650 MILLIGRAM(S): at 18:42

## 2021-01-11 RX ADMIN — Medication 650 MILLIGRAM(S): at 12:18

## 2021-01-11 RX ADMIN — SODIUM CHLORIDE 1000 MILLILITER(S): 9 INJECTION INTRAMUSCULAR; INTRAVENOUS; SUBCUTANEOUS at 02:35

## 2021-01-11 NOTE — ED ADULT NURSE REASSESSMENT NOTE - NS ED NURSE REASSESS COMMENT FT1
pt remains in er awaiting bed assignment is arousable to strong verbal stimulation on cardiac monitor positioned for comfort

## 2021-01-12 LAB
ALBUMIN SERPL ELPH-MCNC: 2.7 G/DL — LOW (ref 3.3–5)
ALP SERPL-CCNC: 74 U/L — SIGNIFICANT CHANGE UP (ref 40–120)
ALT FLD-CCNC: 17 U/L — SIGNIFICANT CHANGE UP (ref 12–78)
ANION GAP SERPL CALC-SCNC: 8 MMOL/L — SIGNIFICANT CHANGE UP (ref 5–17)
AST SERPL-CCNC: 33 U/L — SIGNIFICANT CHANGE UP (ref 15–37)
BASOPHILS # BLD AUTO: 0.03 K/UL — SIGNIFICANT CHANGE UP (ref 0–0.2)
BASOPHILS NFR BLD AUTO: 0.3 % — SIGNIFICANT CHANGE UP (ref 0–2)
BILIRUB SERPL-MCNC: 0.3 MG/DL — SIGNIFICANT CHANGE UP (ref 0.2–1.2)
BUN SERPL-MCNC: 23 MG/DL — SIGNIFICANT CHANGE UP (ref 7–23)
CALCIUM SERPL-MCNC: 8.3 MG/DL — LOW (ref 8.5–10.1)
CHLORIDE SERPL-SCNC: 104 MMOL/L — SIGNIFICANT CHANGE UP (ref 96–108)
CO2 SERPL-SCNC: 27 MMOL/L — SIGNIFICANT CHANGE UP (ref 22–31)
CREAT SERPL-MCNC: 1.2 MG/DL — SIGNIFICANT CHANGE UP (ref 0.5–1.3)
EOSINOPHIL # BLD AUTO: 0.16 K/UL — SIGNIFICANT CHANGE UP (ref 0–0.5)
EOSINOPHIL NFR BLD AUTO: 1.7 % — SIGNIFICANT CHANGE UP (ref 0–6)
GLUCOSE SERPL-MCNC: 142 MG/DL — HIGH (ref 70–99)
HCT VFR BLD CALC: 33.3 % — LOW (ref 34.5–45)
HGB BLD-MCNC: 11 G/DL — LOW (ref 11.5–15.5)
IMM GRANULOCYTES NFR BLD AUTO: 0.4 % — SIGNIFICANT CHANGE UP (ref 0–1.5)
LYMPHOCYTES # BLD AUTO: 1.15 K/UL — SIGNIFICANT CHANGE UP (ref 1–3.3)
LYMPHOCYTES # BLD AUTO: 12.2 % — LOW (ref 13–44)
MCHC RBC-ENTMCNC: 30.2 PG — SIGNIFICANT CHANGE UP (ref 27–34)
MCHC RBC-ENTMCNC: 33 GM/DL — SIGNIFICANT CHANGE UP (ref 32–36)
MCV RBC AUTO: 91.5 FL — SIGNIFICANT CHANGE UP (ref 80–100)
MONOCYTES # BLD AUTO: 0.46 K/UL — SIGNIFICANT CHANGE UP (ref 0–0.9)
MONOCYTES NFR BLD AUTO: 4.9 % — SIGNIFICANT CHANGE UP (ref 2–14)
NEUTROPHILS # BLD AUTO: 7.55 K/UL — HIGH (ref 1.8–7.4)
NEUTROPHILS NFR BLD AUTO: 80.5 % — HIGH (ref 43–77)
NRBC # BLD: 0 /100 WBCS — SIGNIFICANT CHANGE UP (ref 0–0)
PLATELET # BLD AUTO: 213 K/UL — SIGNIFICANT CHANGE UP (ref 150–400)
POTASSIUM SERPL-MCNC: 4.1 MMOL/L — SIGNIFICANT CHANGE UP (ref 3.5–5.3)
POTASSIUM SERPL-SCNC: 4.1 MMOL/L — SIGNIFICANT CHANGE UP (ref 3.5–5.3)
PROT SERPL-MCNC: 6.7 G/DL — SIGNIFICANT CHANGE UP (ref 6–8.3)
RBC # BLD: 3.64 M/UL — LOW (ref 3.8–5.2)
RBC # FLD: 12.8 % — SIGNIFICANT CHANGE UP (ref 10.3–14.5)
SODIUM SERPL-SCNC: 139 MMOL/L — SIGNIFICANT CHANGE UP (ref 135–145)
WBC # BLD: 9.39 K/UL — SIGNIFICANT CHANGE UP (ref 3.8–10.5)
WBC # FLD AUTO: 9.39 K/UL — SIGNIFICANT CHANGE UP (ref 3.8–10.5)

## 2021-01-12 RX ORDER — METOPROLOL TARTRATE 50 MG
25 TABLET ORAL ONCE
Refills: 0 | Status: COMPLETED | OUTPATIENT
Start: 2021-01-12 | End: 2021-01-12

## 2021-01-12 RX ORDER — LANOLIN ALCOHOL/MO/W.PET/CERES
3 CREAM (GRAM) TOPICAL AT BEDTIME
Refills: 0 | Status: DISCONTINUED | OUTPATIENT
Start: 2021-01-12 | End: 2021-01-15

## 2021-01-12 RX ADMIN — Medication 25 MILLIGRAM(S): at 06:18

## 2021-01-12 RX ADMIN — Medication 1 TABLET(S): at 04:42

## 2021-01-12 RX ADMIN — Medication 81 MILLIGRAM(S): at 10:59

## 2021-01-12 RX ADMIN — PIPERACILLIN AND TAZOBACTAM 25 GRAM(S): 4; .5 INJECTION, POWDER, LYOPHILIZED, FOR SOLUTION INTRAVENOUS at 04:42

## 2021-01-12 RX ADMIN — Medication 650 MILLIGRAM(S): at 20:27

## 2021-01-12 RX ADMIN — Medication 650 MILLIGRAM(S): at 14:06

## 2021-01-12 RX ADMIN — ENOXAPARIN SODIUM 40 MILLIGRAM(S): 100 INJECTION SUBCUTANEOUS at 10:59

## 2021-01-12 RX ADMIN — Medication 3 MILLIGRAM(S): at 01:42

## 2021-01-12 RX ADMIN — PIPERACILLIN AND TAZOBACTAM 25 GRAM(S): 4; .5 INJECTION, POWDER, LYOPHILIZED, FOR SOLUTION INTRAVENOUS at 14:06

## 2021-01-12 RX ADMIN — Medication 650 MILLIGRAM(S): at 04:42

## 2021-01-12 RX ADMIN — PIPERACILLIN AND TAZOBACTAM 25 GRAM(S): 4; .5 INJECTION, POWDER, LYOPHILIZED, FOR SOLUTION INTRAVENOUS at 20:27

## 2021-01-12 RX ADMIN — FAMOTIDINE 20 MILLIGRAM(S): 10 INJECTION INTRAVENOUS at 10:59

## 2021-01-12 RX ADMIN — Medication 75 MICROGRAM(S): at 04:42

## 2021-01-12 RX ADMIN — Medication 1 TABLET(S): at 17:56

## 2021-01-12 NOTE — PHYSICAL THERAPY INITIAL EVALUATION ADULT - PERTINENT HX OF CURRENT PROBLEM, REHAB EVAL
pt 89 yo female well until yesterday. pt brought from home after co abd pain, syncope and need to have bm.  Pt with +colitis,  CT abdomen: compatible with colitis, mild bilateral hydronephrosis

## 2021-01-12 NOTE — PROGRESS NOTE ADULT - ASSESSMENT
Chief Complaint: abdominal pain and syncope.    · Chief Complaint: The patient is a 89y Female complaining of abdominal pain.  · Unable to Obtain: Severe Illness/Injury  · Details: per ems, limited per patient  · HPI Objective Statement: pt 89 yo female well until yesterday. pt brought from home after co abd pain, syncope and need to have bm, per ems pt with large bm, syncope no trauma, pt co abd pain now and with n/v in triage, no other hx obtained          PAST MEDICAL/SURGICAL/FAMILY/SOCIAL HISTORY:    Past Medical History:  Hyperlipidemia    Hypertension    Hypothyroidism    Nerve pain.     Past Surgical History:  No significant past surgical history.      admit for ro diverticulitis , sepsis , , place on iv abx , dvt prophylaxis and Gi prophylax , resume other meds ,

## 2021-01-12 NOTE — PHYSICAL THERAPY INITIAL EVALUATION ADULT - ADDITIONAL COMMENTS
Pt lives with daughter in a house with a ramp to enter,  Pt stays on the first floor. Pt has rolling walker

## 2021-01-12 NOTE — PHYSICAL THERAPY INITIAL EVALUATION ADULT - WEIGHT-BEARING RESTRICTIONS: STAND/SIT, REHAB EVAL
1) Continue the Lantus 8 units every day (insulin)  2) Continue the Glipizide one pill with breakfast and increase your dinner dose to two pills with dinner. 3) Continue the Janumet 50-1000mg with breakfast and with dinner.
full weight-bearing

## 2021-01-13 RX ORDER — PSYLLIUM SEED (WITH DEXTROSE)
1 POWDER (GRAM) ORAL
Qty: 0 | Refills: 0 | DISCHARGE

## 2021-01-13 RX ORDER — AMLODIPINE BESYLATE 2.5 MG/1
5 TABLET ORAL
Qty: 0 | Refills: 0 | DISCHARGE

## 2021-01-13 RX ORDER — LEVOTHYROXINE SODIUM 125 MCG
1 TABLET ORAL
Qty: 0 | Refills: 0 | DISCHARGE

## 2021-01-13 RX ORDER — LANOLIN ALCOHOL/MO/W.PET/CERES
1 CREAM (GRAM) TOPICAL
Qty: 0 | Refills: 0 | DISCHARGE

## 2021-01-13 RX ORDER — MIRTAZAPINE 45 MG/1
1 TABLET, ORALLY DISINTEGRATING ORAL
Qty: 0 | Refills: 0 | DISCHARGE

## 2021-01-13 RX ORDER — GABAPENTIN 400 MG/1
0 CAPSULE ORAL
Qty: 0 | Refills: 0 | DISCHARGE

## 2021-01-13 RX ADMIN — Medication 650 MILLIGRAM(S): at 04:58

## 2021-01-13 RX ADMIN — FAMOTIDINE 20 MILLIGRAM(S): 10 INJECTION INTRAVENOUS at 13:11

## 2021-01-13 RX ADMIN — PIPERACILLIN AND TAZOBACTAM 25 GRAM(S): 4; .5 INJECTION, POWDER, LYOPHILIZED, FOR SOLUTION INTRAVENOUS at 22:07

## 2021-01-13 RX ADMIN — Medication 1 TABLET(S): at 04:58

## 2021-01-13 RX ADMIN — PIPERACILLIN AND TAZOBACTAM 25 GRAM(S): 4; .5 INJECTION, POWDER, LYOPHILIZED, FOR SOLUTION INTRAVENOUS at 05:56

## 2021-01-13 RX ADMIN — Medication 81 MILLIGRAM(S): at 13:11

## 2021-01-13 RX ADMIN — Medication 650 MILLIGRAM(S): at 18:03

## 2021-01-13 RX ADMIN — PIPERACILLIN AND TAZOBACTAM 25 GRAM(S): 4; .5 INJECTION, POWDER, LYOPHILIZED, FOR SOLUTION INTRAVENOUS at 13:18

## 2021-01-13 RX ADMIN — Medication 650 MILLIGRAM(S): at 13:11

## 2021-01-13 RX ADMIN — Medication 1 TABLET(S): at 18:03

## 2021-01-13 RX ADMIN — ENOXAPARIN SODIUM 40 MILLIGRAM(S): 100 INJECTION SUBCUTANEOUS at 13:10

## 2021-01-13 RX ADMIN — Medication 75 MICROGRAM(S): at 04:58

## 2021-01-13 RX ADMIN — Medication 25 MILLIGRAM(S): at 04:58

## 2021-01-13 NOTE — PROGRESS NOTE ADULT - ASSESSMENT
Chief Complaint: abdominal pain and syncope.    · Chief Complaint: The patient is a 89y Female complaining of abdominal pain.  · Unable to Obtain: Severe Illness/Injury  · Details: per ems, limited per patient  · HPI Objective Statement: pt 89 yo female well until yesterday. pt brought from home after co abd pain, syncope and need to have bm, per ems pt with large bm, syncope no trauma, pt co abd pain now and with n/v in triage, no other hx obtained          PAST MEDICAL/SURGICAL/FAMILY/SOCIAL HISTORY:    Past Medical History:  Hyperlipidemia    Hypertension    Hypothyroidism    Nerve pain.     Past Surgical History:  No significant past surgical history.  admit for ro diverticulitis , sepsis , , place on iv abx , dvt prophylaxis and GI prophylax , resume other meds ,    1.13.21   patient stable, awake , wants to go home , dtr syas she cannot care for her as patient is very needy and has many needs   on iv abx foe diverticulitis    consider rehab ,

## 2021-01-14 RX ADMIN — PIPERACILLIN AND TAZOBACTAM 25 GRAM(S): 4; .5 INJECTION, POWDER, LYOPHILIZED, FOR SOLUTION INTRAVENOUS at 13:10

## 2021-01-14 RX ADMIN — Medication 1 TABLET(S): at 05:21

## 2021-01-14 RX ADMIN — Medication 75 MICROGRAM(S): at 05:21

## 2021-01-14 RX ADMIN — PIPERACILLIN AND TAZOBACTAM 25 GRAM(S): 4; .5 INJECTION, POWDER, LYOPHILIZED, FOR SOLUTION INTRAVENOUS at 05:21

## 2021-01-14 RX ADMIN — Medication 650 MILLIGRAM(S): at 17:33

## 2021-01-14 RX ADMIN — ENOXAPARIN SODIUM 40 MILLIGRAM(S): 100 INJECTION SUBCUTANEOUS at 13:10

## 2021-01-14 RX ADMIN — PIPERACILLIN AND TAZOBACTAM 25 GRAM(S): 4; .5 INJECTION, POWDER, LYOPHILIZED, FOR SOLUTION INTRAVENOUS at 22:52

## 2021-01-14 RX ADMIN — Medication 81 MILLIGRAM(S): at 13:10

## 2021-01-14 RX ADMIN — FAMOTIDINE 20 MILLIGRAM(S): 10 INJECTION INTRAVENOUS at 13:10

## 2021-01-14 RX ADMIN — Medication 650 MILLIGRAM(S): at 23:08

## 2021-01-14 RX ADMIN — Medication 1 TABLET(S): at 17:33

## 2021-01-14 RX ADMIN — Medication 650 MILLIGRAM(S): at 05:21

## 2021-01-14 RX ADMIN — Medication 25 MILLIGRAM(S): at 05:21

## 2021-01-14 RX ADMIN — Medication 650 MILLIGRAM(S): at 13:10

## 2021-01-15 VITALS
HEART RATE: 79 BPM | SYSTOLIC BLOOD PRESSURE: 139 MMHG | RESPIRATION RATE: 18 BRPM | DIASTOLIC BLOOD PRESSURE: 81 MMHG | OXYGEN SATURATION: 96 % | TEMPERATURE: 98 F

## 2021-01-15 LAB
CULTURE RESULTS: SIGNIFICANT CHANGE UP
CULTURE RESULTS: SIGNIFICANT CHANGE UP
SARS-COV-2 RNA SPEC QL NAA+PROBE: SIGNIFICANT CHANGE UP
SPECIMEN SOURCE: SIGNIFICANT CHANGE UP
SPECIMEN SOURCE: SIGNIFICANT CHANGE UP

## 2021-01-15 PROCEDURE — 99285 EMERGENCY DEPT VISIT HI MDM: CPT | Mod: 25

## 2021-01-15 PROCEDURE — 97530 THERAPEUTIC ACTIVITIES: CPT

## 2021-01-15 PROCEDURE — 85610 PROTHROMBIN TIME: CPT

## 2021-01-15 PROCEDURE — 80053 COMPREHEN METABOLIC PANEL: CPT

## 2021-01-15 PROCEDURE — 86769 SARS-COV-2 COVID-19 ANTIBODY: CPT

## 2021-01-15 PROCEDURE — 84443 ASSAY THYROID STIM HORMONE: CPT

## 2021-01-15 PROCEDURE — U0005: CPT

## 2021-01-15 PROCEDURE — 85025 COMPLETE CBC W/AUTO DIFF WBC: CPT

## 2021-01-15 PROCEDURE — 97161 PT EVAL LOW COMPLEX 20 MIN: CPT

## 2021-01-15 PROCEDURE — U0003: CPT

## 2021-01-15 PROCEDURE — 71045 X-RAY EXAM CHEST 1 VIEW: CPT

## 2021-01-15 PROCEDURE — 83690 ASSAY OF LIPASE: CPT

## 2021-01-15 PROCEDURE — 70450 CT HEAD/BRAIN W/O DYE: CPT

## 2021-01-15 PROCEDURE — 93005 ELECTROCARDIOGRAM TRACING: CPT

## 2021-01-15 PROCEDURE — 87086 URINE CULTURE/COLONY COUNT: CPT

## 2021-01-15 PROCEDURE — 87040 BLOOD CULTURE FOR BACTERIA: CPT

## 2021-01-15 PROCEDURE — 97110 THERAPEUTIC EXERCISES: CPT

## 2021-01-15 PROCEDURE — 74176 CT ABD & PELVIS W/O CONTRAST: CPT

## 2021-01-15 PROCEDURE — 81001 URINALYSIS AUTO W/SCOPE: CPT

## 2021-01-15 PROCEDURE — 84484 ASSAY OF TROPONIN QUANT: CPT

## 2021-01-15 PROCEDURE — 83605 ASSAY OF LACTIC ACID: CPT

## 2021-01-15 PROCEDURE — 85730 THROMBOPLASTIN TIME PARTIAL: CPT

## 2021-01-15 PROCEDURE — 97116 GAIT TRAINING THERAPY: CPT

## 2021-01-15 PROCEDURE — 82553 CREATINE MB FRACTION: CPT

## 2021-01-15 PROCEDURE — 36415 COLL VENOUS BLD VENIPUNCTURE: CPT

## 2021-01-15 PROCEDURE — 96375 TX/PRO/DX INJ NEW DRUG ADDON: CPT

## 2021-01-15 PROCEDURE — 96365 THER/PROPH/DIAG IV INF INIT: CPT

## 2021-01-15 PROCEDURE — 82550 ASSAY OF CK (CPK): CPT

## 2021-01-15 RX ORDER — METRONIDAZOLE 500 MG
1 TABLET ORAL
Qty: 0 | Refills: 0 | DISCHARGE
Start: 2021-01-15

## 2021-01-15 RX ORDER — GABAPENTIN 400 MG/1
0 CAPSULE ORAL
Qty: 0 | Refills: 0 | DISCHARGE

## 2021-01-15 RX ORDER — ATORVASTATIN CALCIUM 80 MG/1
10 TABLET, FILM COATED ORAL AT BEDTIME
Refills: 0 | Status: DISCONTINUED | OUTPATIENT
Start: 2021-01-15 | End: 2021-01-15

## 2021-01-15 RX ORDER — LISINOPRIL 2.5 MG/1
1 TABLET ORAL
Qty: 0 | Refills: 0 | DISCHARGE
Start: 2021-01-15

## 2021-01-15 RX ORDER — LISINOPRIL 2.5 MG/1
1 TABLET ORAL
Qty: 0 | Refills: 0 | DISCHARGE

## 2021-01-15 RX ORDER — FAMOTIDINE 10 MG/ML
1 INJECTION INTRAVENOUS
Qty: 0 | Refills: 0 | DISCHARGE
Start: 2021-01-15

## 2021-01-15 RX ORDER — ROPINIROLE 8 MG/1
1 TABLET, FILM COATED, EXTENDED RELEASE ORAL
Qty: 0 | Refills: 0 | DISCHARGE
Start: 2021-01-15

## 2021-01-15 RX ORDER — ENOXAPARIN SODIUM 100 MG/ML
30 INJECTION SUBCUTANEOUS
Qty: 0 | Refills: 0 | DISCHARGE
Start: 2021-01-15

## 2021-01-15 RX ORDER — LISINOPRIL 2.5 MG/1
10 TABLET ORAL DAILY
Refills: 0 | Status: DISCONTINUED | OUTPATIENT
Start: 2021-01-15 | End: 2021-01-15

## 2021-01-15 RX ORDER — FUROSEMIDE 40 MG
20 TABLET ORAL
Qty: 0 | Refills: 0 | DISCHARGE

## 2021-01-15 RX ORDER — AMLODIPINE BESYLATE 2.5 MG/1
5 TABLET ORAL DAILY
Refills: 0 | Status: DISCONTINUED | OUTPATIENT
Start: 2021-01-15 | End: 2021-01-15

## 2021-01-15 RX ORDER — ROPINIROLE 8 MG/1
0.25 TABLET, FILM COATED, EXTENDED RELEASE ORAL
Refills: 0 | Status: DISCONTINUED | OUTPATIENT
Start: 2021-01-15 | End: 2021-01-15

## 2021-01-15 RX ORDER — METRONIDAZOLE 500 MG
500 TABLET ORAL THREE TIMES A DAY
Refills: 0 | Status: DISCONTINUED | OUTPATIENT
Start: 2021-01-15 | End: 2021-01-15

## 2021-01-15 RX ADMIN — Medication 500 MILLIGRAM(S): at 15:43

## 2021-01-15 RX ADMIN — Medication 1 TABLET(S): at 16:14

## 2021-01-15 RX ADMIN — Medication 25 MILLIGRAM(S): at 05:51

## 2021-01-15 RX ADMIN — FAMOTIDINE 20 MILLIGRAM(S): 10 INJECTION INTRAVENOUS at 11:29

## 2021-01-15 RX ADMIN — Medication 650 MILLIGRAM(S): at 11:29

## 2021-01-15 RX ADMIN — Medication 650 MILLIGRAM(S): at 16:15

## 2021-01-15 RX ADMIN — ROPINIROLE 0.25 MILLIGRAM(S): 8 TABLET, FILM COATED, EXTENDED RELEASE ORAL at 16:14

## 2021-01-15 RX ADMIN — PIPERACILLIN AND TAZOBACTAM 25 GRAM(S): 4; .5 INJECTION, POWDER, LYOPHILIZED, FOR SOLUTION INTRAVENOUS at 05:51

## 2021-01-15 RX ADMIN — Medication 650 MILLIGRAM(S): at 05:51

## 2021-01-15 RX ADMIN — Medication 75 MICROGRAM(S): at 05:51

## 2021-01-15 RX ADMIN — Medication 81 MILLIGRAM(S): at 11:29

## 2021-01-15 RX ADMIN — ENOXAPARIN SODIUM 40 MILLIGRAM(S): 100 INJECTION SUBCUTANEOUS at 11:29

## 2021-01-15 RX ADMIN — Medication 1 TABLET(S): at 11:31

## 2021-01-15 RX ADMIN — Medication 1 TABLET(S): at 05:51

## 2021-01-15 NOTE — DISCHARGE NOTE PROVIDER - NSDCMRMEDTOKEN_GEN_ALL_CORE_FT
acetaminophen 325 mg oral tablet: 2 tab(s) orally every 6 hours, As needed, pain  amLODIPine: 5 milligram(s) orally once a day  aspirin 81 mg oral delayed release tablet: 1 tab(s) orally once a day  enoxaparin: 30 milligram(s) subcutaneous once a day  famotidine 20 mg oral tablet: 1 tab(s) orally once a day  lactobacillus acidophilus oral capsule: 1 tab(s) orally 2 times a day  levoFLOXacin 250 mg oral tablet: 1 tab(s) orally every 24 hours x 1 week  levothyroxine 75 mcg (0.075 mg) oral tablet: 1 tab(s) orally once a day  lisinopril 10 mg oral tablet: 1 tab(s) orally once a day  Melatonin 3 mg oral tablet: 1 tab(s) orally once a day (at bedtime)  Metamucil 3.4 g/3.7 g oral powder for reconstitution: 1  orally once a day (at bedtime)  metoprolol succinate 25 mg oral tablet, extended release:  orally once a day,hold for sbp below 100 and hr below 60  metroNIDAZOLE 500 mg oral tablet: 1 tab(s) orally 3 times a day  x 1 week  mirtazapine 30 mg oral tablet: 1 tab(s) orally once a day (at bedtime)  Multiple Vitamins oral tablet: 1 tab(s) orally once a day  pravastatin 40 mg oral tablet: 1 tab(s) orally once a day  rOPINIRole 0.25 mg oral tablet: 1 tab(s) orally 2 times a day

## 2021-01-15 NOTE — PROGRESS NOTE ADULT - SUBJECTIVE AND OBJECTIVE BOX
PCP  Subjective:   in bed , awake     Objective:   Vital Signs Last 24 Hrs  T(C): 36.8 (01-14-21 @ 05:00), Max: 36.9 (01-13-21 @ 13:42)  T(F): 98.2 (01-14-21 @ 05:00), Max: 98.5 (01-13-21 @ 13:42)  HR: 71 (01-14-21 @ 05:00) (66 - 71)  BP: 156/74 (01-14-21 @ 05:00) (156/74 - 167/76)  BP(mean): --  RR: 17 (01-14-21 @ 05:00) (17 - 17)  SpO2: 95% (01-14-21 @ 05:00) (93% - 95%)  Daily     Daily       GENERAL:  wdwn f , sleepy , and weak ,   EYES: epmi  NECK: general stiffness   CHEST/LUNG: clear  HEART: s1 s2 regular   ABDOMEN:  soft , Positive tenderness  , + bs   EXTREMITIES:  no edema   SKIN:  warm   CNS:  lethargic , sleepy , weak , dec rom , and dec reflexes   CN : 2-12 intact      Allergies: Allergies    No Known Allergies    Intolerances        Home Medications:  acetaminophen 325 mg oral tablet: 2 tab(s) orally every 6 hours, As needed, pain (13 Jan 2021 10:58)  amLODIPine: 5 milligram(s) orally once a day (13 Jan 2021 10:58)  aspirin 81 mg oral delayed release tablet: 1 tab(s) orally once a day (13 Jan 2021 10:58)  gabapentin 100 mg oral capsule: orally 2 times a day (13 Jan 2021 10:58)  lactobacillus acidophilus oral capsule: 1 tab(s) orally 2 times a day (13 Jan 2021 10:58)  Lasix: 20 milligram(s) orally once a day (13 Jan 2021 10:58)  levothyroxine 75 mcg (0.075 mg) oral tablet: 1 tab(s) orally once a day (13 Jan 2021 10:58)  lisinopril 40 mg oral tablet: 1 tab(s) orally once a day (13 Jan 2021 10:58)  Melatonin 3 mg oral tablet: 1 tab(s) orally once a day (at bedtime) (13 Jan 2021 10:58)  Metamucil 3.4 g/3.7 g oral powder for reconstitution: 1  orally once a day (at bedtime) (13 Jan 2021 10:58)  metoprolol succinate 25 mg oral tablet, extended release:  orally once a day,hold for sbp below 100 and hr below 60 (13 Jan 2021 10:58)  mirtazapine 30 mg oral tablet: 1 tab(s) orally once a day (at bedtime) (13 Jan 2021 10:58)  Multiple Vitamins oral tablet: 1 tab(s) orally once a day (13 Jan 2021 10:58)  pravastatin 40 mg oral tablet: 1 tab(s) orally once a day (13 Jan 2021 10:58)  rOPINIRole 0.5 mg oral tablet: 1 tab(s) orally 2 times a day (13 Jan 2021 10:58)    Medications:   acetaminophen   Tablet .. 650 milliGRAM(s) Oral every 6 hours  aspirin enteric coated 81 milliGRAM(s) Oral daily  enoxaparin Injectable 40 milliGRAM(s) SubCutaneous daily  famotidine    Tablet 20 milliGRAM(s) Oral daily  lactobacillus acidophilus 1 Tablet(s) Oral two times a day  levothyroxine 75 MICROGram(s) Oral daily  melatonin 3 milliGRAM(s) Oral at bedtime PRN  metoprolol succinate ER 25 milliGRAM(s) Oral daily  piperacillin/tazobactam IVPB.. 3.375 Gram(s) IV Intermittent every 8 hours      LABS:            01-10 @ 17:51  INR 1.01        CAPILLARY BLOOD GLUCOSE              RECENT CULTURES:  Culture Results:   No growth to date. (01-10 @ 22:14)  Culture Results:   No growth to date. (01-10 @ 22:14)  Culture Results:   No growth (01-10 @ 21:56)        Culture - Blood (collected 01-10-21 @ 22:14)  Source: .Blood Blood-Venous  Preliminary Report (01-11-21 @ 23:01):    No growth to date.    Culture - Blood (collected 01-10-21 @ 22:14)  Source: .Blood Blood-Venous  Preliminary Report (01-11-21 @ 23:01):    No growth to date.    Culture - Urine (collected 01-10-21 @ 21:56)  Source: .Urine Catheterized  Final Report (01-11-21 @ 18:29):    No growth    
PCP  Subjective:   in bed , awake , alert    Objective:   Vital Signs Last 24 Hrs  T(C): 36.9 (01-13-21 @ 13:42), Max: 36.9 (01-13-21 @ 13:42)  T(F): 98.5 (01-13-21 @ 13:42), Max: 98.5 (01-13-21 @ 13:42)  HR: 67 (01-13-21 @ 13:42) (65 - 77)  BP: 167/76 (01-13-21 @ 13:42) (155/76 - 188/96)  BP(mean): --  RR: 17 (01-13-21 @ 13:42) (17 - 18)  SpO2: 93% (01-13-21 @ 13:42) (93% - 94%)  Daily     Daily       GENERAL:  wdwn f , sleepy , and weak ,   EYES: epmi  NECK: general stiffness   CHEST/LUNG: clear  HEART: s1 s2 regular   ABDOMEN:  soft , Positive tenderness  , + bs   EXTREMITIES:  no edema   SKIN:  warm   CNS:  lethargic , sleepy , weak , dec rom , and dec reflexes   CN : 2-12 intact      Allergies: Allergies    No Known Allergies    Intolerances        Home Medications:  acetaminophen 325 mg oral tablet: 2 tab(s) orally every 6 hours, As needed, pain (13 Jan 2021 10:58)  amLODIPine: 5 milligram(s) orally once a day (13 Jan 2021 10:58)  aspirin 81 mg oral delayed release tablet: 1 tab(s) orally once a day (13 Jan 2021 10:58)  gabapentin 100 mg oral capsule: orally 2 times a day (13 Jan 2021 10:58)  lactobacillus acidophilus oral capsule: 1 tab(s) orally 2 times a day (13 Jan 2021 10:58)  Lasix: 20 milligram(s) orally once a day (13 Jan 2021 10:58)  levothyroxine 75 mcg (0.075 mg) oral tablet: 1 tab(s) orally once a day (13 Jan 2021 10:58)  lisinopril 40 mg oral tablet: 1 tab(s) orally once a day (13 Jan 2021 10:58)  Melatonin 3 mg oral tablet: 1 tab(s) orally once a day (at bedtime) (13 Jan 2021 10:58)  Metamucil 3.4 g/3.7 g oral powder for reconstitution: 1  orally once a day (at bedtime) (13 Jan 2021 10:58)  metoprolol succinate 25 mg oral tablet, extended release:  orally once a day,hold for sbp below 100 and hr below 60 (13 Jan 2021 10:58)  mirtazapine 30 mg oral tablet: 1 tab(s) orally once a day (at bedtime) (13 Jan 2021 10:58)  Multiple Vitamins oral tablet: 1 tab(s) orally once a day (13 Jan 2021 10:58)  pravastatin 40 mg oral tablet: 1 tab(s) orally once a day (13 Jan 2021 10:58)  rOPINIRole 0.5 mg oral tablet: 1 tab(s) orally 2 times a day (13 Jan 2021 10:58)    Medications:   acetaminophen   Tablet .. 650 milliGRAM(s) Oral every 6 hours  aspirin enteric coated 81 milliGRAM(s) Oral daily  enoxaparin Injectable 40 milliGRAM(s) SubCutaneous daily  famotidine    Tablet 20 milliGRAM(s) Oral daily  lactobacillus acidophilus 1 Tablet(s) Oral two times a day  levothyroxine 75 MICROGram(s) Oral daily  melatonin 3 milliGRAM(s) Oral at bedtime PRN  metoprolol succinate ER 25 milliGRAM(s) Oral daily  piperacillin/tazobactam IVPB.. 3.375 Gram(s) IV Intermittent every 8 hours      LABS:                        11.0   9.39  )-----------( 213      ( 12 Jan 2021 09:20 )             33.3     01-12    139  |  104  |  23  ----------------------------<  142<H>  4.1   |  27  |  1.20    Ca    8.3<L>      12 Jan 2021 09:20    TPro  6.7  /  Alb  2.7<L>  /  TBili  0.3  /  DBili  x   /  AST  33  /  ALT  17  /  AlkPhos  74  01-12      01-10 @ 17:51  INR 1.01        CAPILLARY BLOOD GLUCOSE              RECENT CULTURES:  Culture Results:   No growth to date. (01-10 @ 22:14)  Culture Results:   No growth to date. (01-10 @ 22:14)  Culture Results:   No growth (01-10 @ 21:56)        Culture - Blood (collected 01-10-21 @ 22:14)  Source: .Blood Blood-Venous  Preliminary Report (01-11-21 @ 23:01):    No growth to date.    Culture - Blood (collected 01-10-21 @ 22:14)  Source: .Blood Blood-Venous  Preliminary Report (01-11-21 @ 23:01):    No growth to date.    Culture - Urine (collected 01-10-21 @ 21:56)  Source: .Urine Catheterized  Final Report (01-11-21 @ 18:29):    No growth    
PCP  Subjective:   in bed , sleepy    Objective:   Vital Signs Last 24 Hrs  T(C): 36.8 (21 @ 05:34), Max: 36.8 (21 @ 21:28)  T(F): 98.2 (21 @ 05:34), Max: 98.3 (21 @ 21:28)  HR: 82 (21 @ 05:34) (70 - 82)  BP: 133/66 (21 @ 05:34) (105/59 - 133/66)  BP(mean): --  RR: 18 (21 @ 05:34) (16 - 18)  SpO2: 94% (21 @ 05:34) (94% - 99%)  Daily     Daily     GENERAL:  wdwn f , sleepy , and weak ,   EYES: epmi  NECK: general stiffness   CHEST/LUNG: clear  HEART: s1 s2 regular   ABDOMEN:  soft , Positive tenderness  , + bs   EXTREMITIES:  no edema   SKIN:  warm   CNS:  lethargic , sleepy , weak , dec rom , and dec reflexes   CN : 2-12 intact    Allergies: Allergies    No Known Allergies    Intolerances        Home Medications:  acetaminophen 325 mg oral tablet: 2 tab(s) orally every 6 hours, As needed, pain (18 Dec 2018 11:38)  amLODIPine: 5 milligram(s) orally once a day (19 Dec 2018 08:05)  aspirin 81 mg oral delayed release tablet: 1 tab(s) orally once a day (18 Dec 2018 11:38)  famotidine 20 mg oral tablet: 1 tab(s) orally once a day (18 Dec 2018 11:38)  gabapentin 100 mg oral capsule:  orally once a day (at bedtime) (18 Dec 2018 11:38)  lactobacillus acidophilus oral capsule: 1 tab(s) orally 2 times a day (19 Dec 2018 08:05)  Lasix: 20 milligram(s) orally once a day (19 Dec 2018 08:05)  metoprolol succinate 25 mg oral tablet, extended release:  orally once a day,hold for sbp below 100 and hr below 60 (18 Dec 2018 11:38)  mirtazapine 15 mg oral tablet: 1 tab(s) orally once a day (at bedtime) (18 Dec 2018 11:38)  pravastatin 40 mg oral tablet: 1 tab(s) orally once a day (18 Dec 2018 11:38)  rOPINIRole 0.25 mg oral tablet: 1 tab(s) orally once a day (at bedtime) (19 Dec 2018 08:05)  Synthroid 75 mcg (0.075 mg) oral tablet: 1 tab(s) orally once a day (18 Dec 2018 11:38)  venlafaxine 37.5 mg oral tablet: 1 tab(s) orally 2 times a day (with meals) (21 Dec 2018 10:06)    Medications:   acetaminophen   Tablet .. 650 milliGRAM(s) Oral every 6 hours  aspirin enteric coated 81 milliGRAM(s) Oral daily  enoxaparin Injectable 40 milliGRAM(s) SubCutaneous daily  famotidine    Tablet 20 milliGRAM(s) Oral daily  lactobacillus acidophilus 1 Tablet(s) Oral two times a day  levothyroxine 75 MICROGram(s) Oral daily  melatonin 3 milliGRAM(s) Oral at bedtime PRN  metoprolol succinate ER 25 milliGRAM(s) Oral daily  piperacillin/tazobactam IVPB.. 3.375 Gram(s) IV Intermittent every 8 hours      LABS:                        10.6   13.56 )-----------( 208      ( 2021 06:36 )             33.6     01-11    140  |  105  |  21  ----------------------------<  149<H>  4.1   |  29  |  1.20    Ca    7.8<L>      2021 06:36    TPro  5.6<L>  /  Alb  2.4<L>  /  TBili  0.4  /  DBili  x   /  AST  24  /  ALT  15  /  AlkPhos  66  01-11    PT/INR - ( 10 Presley 2021 17:51 )   PT: 11.8 sec;   INR: 1.01 ratio         PTT - ( 10 Presley 2021 17:51 )  PTT:26.7 sec  01-10 @ 17:51  INR 1.01    Urinalysis Basic - ( 10 Presley 2021 17:52 )    Color: Yellow / Appearance: Clear / S.010 / pH: x  Gluc: x / Ketone: Negative  / Bili: Negative / Urobili: Negative   Blood: x / Protein: Negative / Nitrite: Negative   Leuk Esterase: Small / RBC: 0-2 /HPF / WBC 3-5   Sq Epi: x / Non Sq Epi: Moderate / Bacteria: Few        CAPILLARY BLOOD GLUCOSE          CARDIAC MARKERS ( 10 Presley 2021 17:51 )  <.015 ng/mL / x     / 118 U/L / x     / 1.5 ng/mL      RECENT CULTURES:  Culture Results:   No growth to date. (01-10 @ 22:14)  Culture Results:   No growth to date. (01-10 @ 22:14)  Culture Results:   No growth (01-10 @ :56)        Culture - Blood (collected 01-10-21 @ 22:14)  Source: .Blood Blood-Venous  Preliminary Report (21 @ 23:01):    No growth to date.    Culture - Blood (collected 01-10-21 @ 22:14)  Source: .Blood Blood-Venous  Preliminary Report (21 @ 23:01):    No growth to date.    Culture - Urine (collected 01-10-21 @ 21:56)  Source: .Urine Catheterized  Final Report (21 @ 18:29):    No growth    
PCP  Subjective:   in bed , sleepy , on exam abd tender     Objective:   Vital Signs Last 24 Hrs  T(C): 36.8 (21 @ 05:33), Max: 37 (21 @ 00:29)  T(F): 98.3 (21 @ 05:33), Max: 98.6 (21 @ 00:29)  HR: 59 (21 @ 07:42) (58 - 67)  BP: 91/44 (21 @ 07:42) (79/44 - 124/77)  BP(mean): --  RR: 18 (21 @ 07:42) (16 - 18)  SpO2: 98% (21 @ 07:42) (95% - 99%)  Daily     Daily     GENERAL:  wdwn f , sleepy , and weak ,   EYES: epmi  NECK: general stiffness   CHEST/LUNG: clear  HEART: s1 s2 regular   ABDOMEN:  soft , Positive tenderness  , + bs   EXTREMITIES:  no edema   SKIN:  warm   CNS:  lethargic , sleepy , weak , dec rom , and dec reflexes     Allergies: Allergies    No Known Allergies    Intolerances        Home Medications:  acetaminophen 325 mg oral tablet: 2 tab(s) orally every 6 hours, As needed, pain (18 Dec 2018 11:38)  amLODIPine: 5 milligram(s) orally once a day (19 Dec 2018 08:05)  aspirin 81 mg oral delayed release tablet: 1 tab(s) orally once a day (18 Dec 2018 11:38)  famotidine 20 mg oral tablet: 1 tab(s) orally once a day (18 Dec 2018 11:38)  gabapentin 100 mg oral capsule:  orally once a day (at bedtime) (18 Dec 2018 11:38)  lactobacillus acidophilus oral capsule: 1 tab(s) orally 2 times a day (19 Dec 2018 08:05)  Lasix: 20 milligram(s) orally once a day (19 Dec 2018 08:05)  metoprolol succinate 25 mg oral tablet, extended release:  orally once a day,hold for sbp below 100 and hr below 60 (18 Dec 2018 11:38)  mirtazapine 15 mg oral tablet: 1 tab(s) orally once a day (at bedtime) (18 Dec 2018 11:38)  pravastatin 40 mg oral tablet: 1 tab(s) orally once a day (18 Dec 2018 11:38)  rOPINIRole 0.25 mg oral tablet: 1 tab(s) orally once a day (at bedtime) (19 Dec 2018 08:05)  Synthroid 75 mcg (0.075 mg) oral tablet: 1 tab(s) orally once a day (18 Dec 2018 11:38)  venlafaxine 37.5 mg oral tablet: 1 tab(s) orally 2 times a day (with meals) (21 Dec 2018 10:06)    Medications:   acetaminophen   Tablet .. 650 milliGRAM(s) Oral every 6 hours  aspirin enteric coated 81 milliGRAM(s) Oral daily  enoxaparin Injectable 40 milliGRAM(s) SubCutaneous daily  famotidine    Tablet 20 milliGRAM(s) Oral daily  lactobacillus acidophilus 1 Tablet(s) Oral two times a day  levothyroxine 75 MICROGram(s) Oral daily  metoprolol succinate ER 25 milliGRAM(s) Oral daily  piperacillin/tazobactam IVPB.. 3.375 Gram(s) IV Intermittent every 8 hours      LABS:                        10.6   13.56 )-----------( 208      ( 2021 06:36 )             33.6     -11    140  |  105  |  21  ----------------------------<  149<H>  4.1   |  29  |  1.20    Ca    7.8<L>      2021 06:36    TPro  5.6<L>  /  Alb  2.4<L>  /  TBili  0.4  /  DBili  x   /  AST  24  /  ALT  15  /  AlkPhos  66  01-11    PT/INR - ( 10 Presley 2021 17:51 )   PT: 11.8 sec;   INR: 1.01 ratio         PTT - ( 10 Presley 2021 17:51 )  PTT:26.7 sec  01-10 @ 17:51  INR 1.01    Urinalysis Basic - ( 10 Presley 2021 17:52 )    Color: Yellow / Appearance: Clear / S.010 / pH: x  Gluc: x / Ketone: Negative  / Bili: Negative / Urobili: Negative   Blood: x / Protein: Negative / Nitrite: Negative   Leuk Esterase: Small / RBC: 0-2 /HPF / WBC 3-5   Sq Epi: x / Non Sq Epi: Moderate / Bacteria: Few        CAPILLARY BLOOD GLUCOSE          CARDIAC MARKERS ( 10 Presley 2021 17:51 )  <.015 ng/mL / x     / 118 U/L / x     / 1.5 ng/mL      RECENT CULTURES:      
PCP  Subjective:   in bed , awake , alert     Objective:   Vital Signs Last 24 Hrs  T(C): 36.4 (01-15-21 @ 04:46), Max: 37.1 (01-14-21 @ 16:18)  T(F): 97.6 (01-15-21 @ 04:46), Max: 98.8 (01-14-21 @ 16:18)  HR: 66 (01-15-21 @ 04:46) (63 - 70)  BP: 188/92 (01-15-21 @ 04:54) (167/80 - 191/91)  BP(mean): --  RR: 18 (01-15-21 @ 04:46) (18 - 18)  SpO2: 94% (01-15-21 @ 04:46) (94% - 95%)  Daily     Daily     GENERAL:  wdwn f , sleepy , and weak ,   EYES: eomi  NECK: general stiffness   CHEST/LUNG: clear to a/p  HEART: s1 s2 regular   ABDOMEN:  soft , Positive tenderness  , + bs   EXTREMITIES:  no edema   SKIN:  warm   CNS:  lethargic , sleepy , weak , dec rom , and dec reflexes   CN : 2-12 intact        Allergies: Allergies    No Known Allergies    Intolerances        Home Medications:  acetaminophen 325 mg oral tablet: 2 tab(s) orally every 6 hours, As needed, pain (13 Jan 2021 10:58)  amLODIPine: 5 milligram(s) orally once a day (13 Jan 2021 10:58)  aspirin 81 mg oral delayed release tablet: 1 tab(s) orally once a day (13 Jan 2021 10:58)  gabapentin 100 mg oral capsule: orally 2 times a day (13 Jan 2021 10:58)  lactobacillus acidophilus oral capsule: 1 tab(s) orally 2 times a day (13 Jan 2021 10:58)  Lasix: 20 milligram(s) orally once a day (13 Jan 2021 10:58)  levothyroxine 75 mcg (0.075 mg) oral tablet: 1 tab(s) orally once a day (13 Jan 2021 10:58)  lisinopril 40 mg oral tablet: 1 tab(s) orally once a day (13 Jan 2021 10:58)  Melatonin 3 mg oral tablet: 1 tab(s) orally once a day (at bedtime) (13 Jan 2021 10:58)  Metamucil 3.4 g/3.7 g oral powder for reconstitution: 1  orally once a day (at bedtime) (13 Jan 2021 10:58)  metoprolol succinate 25 mg oral tablet, extended release:  orally once a day,hold for sbp below 100 and hr below 60 (13 Jan 2021 10:58)  mirtazapine 30 mg oral tablet: 1 tab(s) orally once a day (at bedtime) (13 Jan 2021 10:58)  Multiple Vitamins oral tablet: 1 tab(s) orally once a day (13 Jan 2021 10:58)  pravastatin 40 mg oral tablet: 1 tab(s) orally once a day (13 Jan 2021 10:58)  rOPINIRole 0.5 mg oral tablet: 1 tab(s) orally 2 times a day (13 Jan 2021 10:58)    Medications:   acetaminophen   Tablet .. 650 milliGRAM(s) Oral every 6 hours  amLODIPine   Tablet 5 milliGRAM(s) Oral daily  aspirin enteric coated 81 milliGRAM(s) Oral daily  atorvastatin 10 milliGRAM(s) Oral at bedtime  enoxaparin Injectable 40 milliGRAM(s) SubCutaneous daily  famotidine    Tablet 20 milliGRAM(s) Oral daily  lactobacillus acidophilus 1 Tablet(s) Oral two times a day  levoFLOXacin  Tablet 250 milliGRAM(s) Oral every 24 hours  levothyroxine 75 MICROGram(s) Oral daily  lisinopril 10 milliGRAM(s) Oral daily  melatonin 3 milliGRAM(s) Oral at bedtime PRN  metoprolol succinate ER 25 milliGRAM(s) Oral daily  metroNIDAZOLE    Tablet 500 milliGRAM(s) Oral three times a day  multivitamin 1 Tablet(s) Oral daily  piperacillin/tazobactam IVPB.. 3.375 Gram(s) IV Intermittent every 8 hours  rOPINIRole 0.25 milliGRAM(s) Oral two times a day      LABS:            01-10 @ 17:51  INR 1.01        CAPILLARY BLOOD GLUCOSE              RECENT CULTURES:  Culture Results:   No growth to date. (01-10 @ 22:14)  Culture Results:   No growth to date. (01-10 @ 22:14)  Culture Results:   No growth (01-10 @ 21:56)        Culture - Blood (collected 01-10-21 @ 22:14)  Source: .Blood Blood-Venous  Preliminary Report (01-11-21 @ 23:01):    No growth to date.    Culture - Blood (collected 01-10-21 @ 22:14)  Source: .Blood Blood-Venous  Preliminary Report (01-11-21 @ 23:01):    No growth to date.    Culture - Urine (collected 01-10-21 @ 21:56)  Source: .Urine Catheterized  Final Report (01-11-21 @ 18:29):    No growth

## 2021-01-15 NOTE — DISCHARGE NOTE PROVIDER - HOSPITAL COURSE
Chief Complaint: abdominal pain and syncope.    · Chief Complaint: The patient is a 89y Female complaining of abdominal pain.  · Unable to Obtain: Severe Illness/Injury  · Details: per ems, limited per patient  · HPI Objective Statement: pt 89 yo female well until yesterday. pt brought from home after co abd pain, syncope and need to have bm, per ems pt with large bm, syncope no trauma, pt co abd pain now and with n/v in triage, no other hx obtained          PAST MEDICAL/SURGICAL/FAMILY/SOCIAL HISTORY:    Past Medical History:  Hyperlipidemia    Hypertension    Hypothyroidism    Nerve pain.     Past Surgical History:  No significant past surgical history.  admit for ro diverticulitis , sepsis , , place on iv abx , dvt prophylaxis and GI prophylax , resume other meds ,    1.13.21   patient stable, awake , wants to go home , dtr syas she cannot care for her as patient is very needy and has many needs   on iv abx foe diverticulitis    consider rehab ,   on 1.15.2021 patient vital signs stable , bp high add norvasc and lisinopril , on regular diet , change to po meds for diverticulitis , plan rehab ,   resume other meds ,      Problem/Plan - 1:  ·  Problem: Colitis.  Plan: iv abx.      Problem/Plan - 2:  ·  Problem: Hypertension, unspecified type.  Plan: metoprolol.      Problem/Plan - 3:  ·  Problem: Hyperlipidemia, unspecified hyperlipidemia type.  Plan: lipitor.      Problem/Plan - 4:  ·  Problem: Hypothyroidism, unspecified type.  Plan: synthroid.      Problem/Plan - 5:  ·  Problem: Nerve pain.  Plan: tylenol.

## 2021-01-15 NOTE — DISCHARGE NOTE NURSING/CASE MANAGEMENT/SOCIAL WORK - PATIENT PORTAL LINK FT
You can access the FollowMyHealth Patient Portal offered by [facility] by registering at the following website: [email or web address]. By joining [facility]’s FollowMyHealth portal, you will also be able to view your health information using other applications (apps) compatible with our system.

## 2021-01-15 NOTE — DISCHARGE NOTE PROVIDER - CARE PROVIDER_API CALL
Gerardo Krishna)  Internal Medicine  [address], [address]  [place] [address]  Phone: [phone]  Fax: [phone]  Follow Up Time:

## 2021-01-15 NOTE — PROGRESS NOTE ADULT - REASON FOR ADMISSION
near syncope and abdominal pain and low bp

## 2021-01-15 NOTE — PROGRESS NOTE ADULT - ASSESSMENT
Chief Complaint: abdominal pain and syncope.    · Chief Complaint: The patient is a 89y Female complaining of abdominal pain.  · Unable to Obtain: Severe Illness/Injury  · Details: per ems, limited per patient  · HPI Objective Statement: pt 89 yo female well until yesterday. pt brought from home after co abd pain, syncope and need to have bm, per ems pt with large bm, syncope no trauma, pt co abd pain now and with n/v in triage, no other hx obtained          PAST MEDICAL/SURGICAL/FAMILY/SOCIAL HISTORY:    Past Medical History:  Hyperlipidemia    Hypertension    Hypothyroidism    Nerve pain.     Past Surgical History:  No significant past surgical history.  admit for ro diverticulitis , sepsis , , place on iv abx , dvt prophylaxis and GI prophylax , resume other meds ,    1.13.21   patient stable, awake , wants to go home , dtr syas she cannot care for her as patient is very needy and has many needs   on iv abx foe diverticulitis    consider rehab ,   on 1.15.2021 patient vital signs stable , bp high add norvasc and lisinopril , on regular diet , change to po meds for diverticulitis , plan rehab ,   resume other meds ,

## 2021-02-08 NOTE — H&P ADULT - PROBLEM SELECTOR PROBLEM 3
Hyperlipidemia, unspecified hyperlipidemia type Azithromycin Counseling:  I discussed with the patient the risks of azithromycin including but not limited to GI upset, allergic reaction, drug rash, diarrhea, and yeast infections.

## 2021-05-21 NOTE — ED PROVIDER NOTE - NS ED MD TWO NIGHTS YN
What Type Of Note Output Would You Prefer (Optional)?: Standard Output How Severe Are Your Spot(S)?: mild Have Your Spot(S) Been Treated In The Past?: has not been treated Hpi Title: Evaluation of Skin Lesions Yes

## 2021-12-10 NOTE — ED ADULT NURSE NOTE - ED STAT RN HANDOFF DETAILS 3
Discussed condition and exacerbating conditions/situations (e.g., dry/arid environments, overhead fans, air conditioners, side effect of medications). Report given to HEIDI Browning

## 2022-08-21 NOTE — PHYSICAL THERAPY INITIAL EVALUATION ADULT - PERTINENT HX OF CURRENT PROBLEM, REHAB EVAL
89 f PMH HLD Htn Hypothyroidism co with frequent falls over last week sec to intermittent dizziness admitted 1/12/2017 with worsening dizziness possible actual syncope episodes Patient co progressive weakness legs
(V5) oriented

## 2022-09-06 NOTE — ED ADULT NURSE NOTE - GASTROINTESTINAL WDL
Rituxan Counseling:  I discussed with the patient the risks of Rituxan infusions. Side effects can include infusion reactions, severe drug rashes including mucocutaneous reactions, reactivation of latent hepatitis and other infections and rarely progressive multifocal leukoencephalopathy.  All of the patient's questions and concerns were addressed. Finasteride Male Counseling: Finasteride Counseling:  I discussed with the patient the risks of use of finasteride including but not limited to decreased libido, decreased ejaculate volume, gynecomastia, and depression. Women should not handle medication.  All of the patient's questions and concerns were addressed. 5-Fu Pregnancy And Lactation Text: This medication is Pregnancy Category X and contraindicated in pregnancy and in women who may become pregnant. It is unknown if this medication is excreted in breast milk. Ivermectin Pregnancy And Lactation Text: This medication is Pregnancy Category C and it isn't known if it is safe during pregnancy. It is also excreted in breast milk. Isotretinoin Pregnancy And Lactation Text: This medication is Pregnancy Category X and is considered extremely dangerous during pregnancy. It is unknown if it is excreted in breast milk. Griseofulvin Pregnancy And Lactation Text: This medication is Pregnancy Category X and is known to cause serious birth defects. It is unknown if this medication is excreted in breast milk but breast feeding should be avoided. Zyclara Counseling:  I discussed with the patient the risks of imiquimod including but not limited to erythema, scaling, itching, weeping, crusting, and pain.  Patient understands that the inflammatory response to imiquimod is variable from person to person and was educated regarded proper titration schedule.  If flu-like symptoms develop, patient knows to discontinue the medication and contact us. Rhofade Counseling: Rhofade is a topical medication which can decrease superficial blood flow where applied. Side effects are uncommon and include stinging, redness and allergic reactions. Azathioprine Counseling:  I discussed with the patient the risks of azathioprine including but not limited to myelosuppression, immunosuppression, hepatotoxicity, lymphoma, and infections.  The patient understands that monitoring is required including baseline LFTs, Creatinine, possible TPMP genotyping and weekly CBCs for the first month and then every 2 weeks thereafter.  The patient verbalized understanding of the proper use and possible adverse effects of azathioprine.  All of the patient's questions and concerns were addressed. Use Enhanced Medication Counseling?: No Erythromycin Counseling:  I discussed with the patient the risks of erythromycin including but not limited to GI upset, allergic reaction, drug rash, diarrhea, increase in liver enzymes, and yeast infections. Drysol Counseling:  I discussed with the patient the risks of drysol/aluminum chloride including but not limited to skin rash, itching, irritation, burning. Otezla Pregnancy And Lactation Text: This medication is Pregnancy Category C and it isn't known if it is safe during pregnancy. It is unknown if it is excreted in breast milk. Finasteride Pregnancy And Lactation Text: This medication is absolutely contraindicated during pregnancy. It is unknown if it is excreted in breast milk. Zyclara Pregnancy And Lactation Text: This medication is Pregnancy Category C. It is unknown if this medication is excreted in breast milk. Rituxan Pregnancy And Lactation Text: This medication is Pregnancy Category C and it isn't know if it is safe during pregnancy. It is unknown if this medication is excreted in breast milk but similar antibodies are known to be excreted. Itraconazole Counseling:  I discussed with the patient the risks of itraconazole including but not limited to liver damage, nausea/vomiting, neuropathy, and severe allergy.  The patient understands that this medication is best absorbed when taken with acidic beverages such as non-diet cola or ginger ale.  The patient understands that monitoring is required including baseline LFTs and repeat LFTs at intervals.  The patient understands that they are to contact us or the primary physician if concerning signs are noted. Rhofade Pregnancy And Lactation Text: This medication has not been assigned a Pregnancy Risk Category. It is unknown if the medication is excreted in breast milk. High Dose Vitamin A Counseling: Side effects reviewed, pt to contact office should one occur. Erythromycin Pregnancy And Lactation Text: This medication is Pregnancy Category B and is considered safe during pregnancy. It is also excreted in breast milk. Oxybutynin Counseling:  I discussed with the patient the risks of oxybutynin including but not limited to skin rash, drowsiness, dry mouth, difficulty urinating, and blurred vision. Azathioprine Pregnancy And Lactation Text: This medication is Pregnancy Category D and isn't considered safe during pregnancy. It is unknown if this medication is excreted in breast milk. Drysol Pregnancy And Lactation Text: This medication is considered safe during pregnancy and breast feeding. Siliq Counseling:  I discussed with the patient the risks of Siliq including but not limited to new or worsening depression, suicidal thoughts and behavior, immunosuppression, malignancy, posterior leukoencephalopathy syndrome, and serious infections.  The patient understands that monitoring is required including a PPD at baseline and must alert us or the primary physician if symptoms of infection or other concerning signs are noted. There is also a special program designed to monitor depression which is required with Siliq. Abdomen soft, nontender, nondistended, bowel sounds present in all 4 quadrants. High Dose Vitamin A Pregnancy And Lactation Text: High dose vitamin A therapy is contraindicated during pregnancy and breast feeding. Gabapentin Counseling: I discussed with the patient the risks of gabapentin including but not limited to dizziness, somnolence, fatigue and ataxia. Cellcept Counseling:  I discussed with the patient the risks of mycophenolate mofetil including but not limited to infection/immunosuppression, GI upset, hypokalemia, hypercholesterolemia, bone marrow suppression, lymphoproliferative disorders, malignancy, GI ulceration/bleed/perforation, colitis, interstitial lung disease, kidney failure, progressive multifocal leukoencephalopathy, and birth defects.  The patient understands that monitoring is required including a baseline creatinine and regular CBC testing. In addition, patient must alert us immediately if symptoms of infection or other concerning signs are noted. Solaraze Counseling:  I discussed with the patient the risks of Solaraze including but not limited to erythema, scaling, itching, weeping, crusting, and pain. Metronidazole Counseling:  I discussed with the patient the risks of metronidazole including but not limited to seizures, nausea/vomiting, a metallic taste in the mouth, nausea/vomiting and severe allergy. Itraconazole Pregnancy And Lactation Text: This medication is Pregnancy Category C and it isn't know if it is safe during pregnancy. It is also excreted in breast milk. Oxybutynin Pregnancy And Lactation Text: This medication is Pregnancy Category B and is considered safe during pregnancy. It is unknown if it is excreted in breast milk. Siliq Pregnancy And Lactation Text: The risk during pregnancy and breastfeeding is uncertain with this medication. Elidel Counseling: Patient may experience a mild burning sensation during topical application. Elidel is not approved in children less than 2 years of age. There have been case reports of hematologic and skin malignancies in patients using topical calcineurin inhibitors although causality is questionable. Gabapentin Pregnancy And Lactation Text: This medication is Pregnancy Category C and isn't considered safe during pregnancy. It is excreted in breast milk. Opioid Counseling: I discussed with the patient the potential side effects of opioids including but not limited to addiction, altered mental status, and depression. I stressed avoiding alcohol, benzodiazepines, muscle relaxants and sleep aids unless specifically okayed by a physician. The patient verbalized understanding of the proper use and possible adverse effects of opioids. All of the patient's questions and concerns were addressed. They were instructed to flush the remaining pills down the toilet if they did not need them for pain. Solaraze Pregnancy And Lactation Text: This medication is Pregnancy Category B and is considered safe. There is some data to suggest avoiding during the third trimester. It is unknown if this medication is excreted in breast milk. Ketoconazole Counseling:   Patient counseled regarding improving absorption with orange juice.  Adverse effects include but are not limited to breast enlargement, headache, diarrhea, nausea, upset stomach, liver function test abnormalities, taste disturbance, and stomach pain.  There is a rare possibility of liver failure that can occur when taking ketoconazole. The patient understands that monitoring of LFTs may be required, especially at baseline. The patient verbalized understanding of the proper use and possible adverse effects of ketoconazole.  All of the patient's questions and concerns were addressed. Metronidazole Pregnancy And Lactation Text: This medication is Pregnancy Category B and considered safe during pregnancy.  It is also excreted in breast milk. Propranolol Counseling:  I discussed with the patient the risks of propranolol including but not limited to low heart rate, low blood pressure, low blood sugar, restlessness and increased cold sensitivity. They should call the office if they experience any of these side effects. Glycopyrrolate Counseling:  I discussed with the patient the risks of glycopyrrolate including but not limited to skin rash, drowsiness, dry mouth, difficulty urinating, and blurred vision. Simponi Counseling:  I discussed with the patient the risks of golimumab including but not limited to myelosuppression, immunosuppression, autoimmune hepatitis, demyelinating diseases, lymphoma, and serious infections.  The patient understands that monitoring is required including a PPD at baseline and must alert us or the primary physician if symptoms of infection or other concerning signs are noted. Cimzia Counseling:  I discussed with the patient the risks of Cimzia including but not limited to immunosuppression, allergic reactions and infections.  The patient understands that monitoring is required including a PPD at baseline and must alert us or the primary physician if symptoms of infection or other concerning signs are noted. Topical Retinoid counseling:  Patient advised to apply a pea-sized amount only at bedtime and wait 30 minutes after washing their face before applying.  If too drying, patient may add a non-comedogenic moisturizer. The patient verbalized understanding of the proper use and possible adverse effects of retinoids.  All of the patient's questions and concerns were addressed. Azelaic Acid Counseling: Patient counseled that medicine may cause skin irritation and to avoid applying near the eyes.  In the event of skin irritation, the patient was advised to reduce the amount of the drug applied or use it less frequently.   The patient verbalized understanding of the proper use and possible adverse effects of azelaic acid.  All of the patient's questions and concerns were addressed. Propranolol Pregnancy And Lactation Text: This medication is Pregnancy Category C and it isn't known if it is safe during pregnancy. It is excreted in breast milk. Opioid Pregnancy And Lactation Text: These medications can lead to premature delivery and should be avoided during pregnancy. These medications are also present in breast milk in small amounts. Cyclophosphamide Counseling:  I discussed with the patient the risks of cyclophosphamide including but not limited to hair loss, hormonal abnormalities, decreased fertility, abdominal pain, diarrhea, nausea and vomiting, bone marrow suppression and infection. The patient understands that monitoring is required while taking this medication. Ketoconazole Pregnancy And Lactation Text: This medication is Pregnancy Category C and it isn't know if it is safe during pregnancy. It is also excreted in breast milk and breast feeding isn't recommended. Minocycline Counseling: Patient advised regarding possible photosensitivity and discoloration of the teeth, skin, lips, tongue and gums.  Patient instructed to avoid sunlight, if possible.  When exposed to sunlight, patients should wear protective clothing, sunglasses, and sunscreen.  The patient was instructed to call the office immediately if the following severe adverse effects occur:  hearing changes, easy bruising/bleeding, severe headache, or vision changes.  The patient verbalized understanding of the proper use and possible adverse effects of minocycline.  All of the patient's questions and concerns were addressed. Eucrisa Counseling: Patient may experience a mild burning sensation during topical application. Eucrisa is not approved in children less than 3 months of age. Cimzia Pregnancy And Lactation Text: This medication crosses the placenta but can be considered safe in certain situations. Cimzia may be excreted in breast milk. Glycopyrrolate Pregnancy And Lactation Text: This medication is Pregnancy Category B and is considered safe during pregnancy. It is unknown if it is excreted breast milk. Terbinafine Counseling: Patient counseling regarding adverse effects of terbinafine including but not limited to headache, diarrhea, rash, upset stomach, liver function test abnormalities, itching, taste/smell disturbance, nausea, abdominal pain, and flatulence.  There is a rare possibility of liver failure that can occur when taking terbinafine.  The patient understands that a baseline LFT and kidney function test may be required. The patient verbalized understanding of the proper use and possible adverse effects of terbinafine.  All of the patient's questions and concerns were addressed. Skyrizi Counseling: I discussed with the patient the risks of risankizumab-rzaa including but not limited to immunosuppression, and serious infections.  The patient understands that monitoring is required including a PPD at baseline and must alert us or the primary physician if symptoms of infection or other concerning signs are noted. Birth Control Pills Counseling: Birth Control Pill Counseling: I discussed with the patient the potential side effects of OCPs including but not limited to increased risk of stroke, heart attack, thrombophlebitis, deep venous thrombosis, hepatic adenomas, breast changes, GI upset, headaches, and depression.  The patient verbalized understanding of the proper use and possible adverse effects of OCPs. All of the patient's questions and concerns were addressed. Eucrisa Pregnancy And Lactation Text: This medication has not been assigned a Pregnancy Risk Category but animal studies failed to show danger with the topical medication. It is unknown if the medication is excreted in breast milk. Minocycline Pregnancy And Lactation Text: This medication is Pregnancy Category D and not consider safe during pregnancy. It is also excreted in breast milk. Cyclophosphamide Pregnancy And Lactation Text: This medication is Pregnancy Category D and it isn't considered safe during pregnancy. This medication is excreted in breast milk. Hydroxychloroquine Counseling:  I discussed with the patient that a baseline ophthalmologic exam is needed at the start of therapy and every year thereafter while on therapy. A CBC may also be warranted for monitoring.  The side effects of this medication were discussed with the patient, including but not limited to agranulocytosis, aplastic anemia, seizures, rashes, retinopathy, and liver toxicity. Patient instructed to call the office should any adverse effect occur.  The patient verbalized understanding of the proper use and possible adverse effects of Plaquenil.  All the patient's questions and concerns were addressed. Cosentyx Counseling:  I discussed with the patient the risks of Cosentyx including but not limited to worsening of Crohn's disease, immunosuppression, allergic reactions and infections.  The patient understands that monitoring is required including a PPD at baseline and must alert us or the primary physician if symptoms of infection or other concerning signs are noted. Arava Counseling:  Patient counseled regarding adverse effects of Arava including but not limited to nausea, vomiting, abnormalities in liver function tests. Patients may develop mouth sores, rash, diarrhea, and abnormalities in blood counts. The patient understands that monitoring is required including LFTs and blood counts.  There is a rare possibility of scarring of the liver and lung problems that can occur when taking methotrexate. Persistent nausea, loss of appetite, pale stools, dark urine, cough, and shortness of breath should be reported immediately. Patient advised to discontinue Arava treatment and consult with a physician prior to attempting conception. The patient will have to undergo a treatment to eliminate Arava from the body prior to conception. Hydroquinone Counseling:  Patient advised that medication may result in skin irritation, lightening (hypopigmentation), dryness, and burning.  In the event of skin irritation, the patient was advised to reduce the amount of the drug applied or use it less frequently.  Rarely, spots that are treated with hydroquinone can become darker (pseudoochronosis).  Should this occur, patient instructed to stop medication and call the office. The patient verbalized understanding of the proper use and possible adverse effects of hydroquinone.  All of the patient's questions and concerns were addressed. Terbinafine Pregnancy And Lactation Text: This medication is Pregnancy Category B and is considered safe during pregnancy. It is also excreted in breast milk and breast feeding isn't recommended. Tazorac Counseling:  Patient advised that medication is irritating and drying.  Patient may need to apply sparingly and wash off after an hour before eventually leaving it on overnight.  The patient verbalized understanding of the proper use and possible adverse effects of tazorac.  All of the patient's questions and concerns were addressed. Birth Control Pills Pregnancy And Lactation Text: This medication should be avoided if pregnant and for the first 30 days post-partum. Benzoyl Peroxide Counseling: Patient counseled that medicine may cause skin irritation and bleach clothing.  In the event of skin irritation, the patient was advised to reduce the amount of the drug applied or use it less frequently.   The patient verbalized understanding of the proper use and possible adverse effects of benzoyl peroxide.  All of the patient's questions and concerns were addressed. Hydroxychloroquine Pregnancy And Lactation Text: This medication has been shown to cause fetal harm but it isn't assigned a Pregnancy Risk Category. There are small amounts excreted in breast milk. Quinolones Counseling:  I discussed with the patient the risks of fluoroquinolones including but not limited to GI upset, allergic reaction, drug rash, diarrhea, dizziness, photosensitivity, yeast infections, liver function test abnormalities, tendonitis/tendon rupture. Cyclosporine Counseling:  I discussed with the patient the risks of cyclosporine including but not limited to hypertension, gingival hyperplasia,myelosuppression, immunosuppression, liver damage, kidney damage, neurotoxicity, lymphoma, and serious infections. The patient understands that monitoring is required including baseline blood pressure, CBC, CMP, lipid panel and uric acid, and then 1-2 times monthly CMP and blood pressure. Cosentyx Pregnancy And Lactation Text: This medication is Pregnancy Category B and is considered safe during pregnancy. It is unknown if this medication is excreted in breast milk. Spironolactone Counseling: Patient advised regarding risks of diarrhea, abdominal pain, hyperkalemia, birth defects (for female patients), liver toxicity and renal toxicity. The patient may need blood work to monitor liver and kidney function and potassium levels while on therapy. The patient verbalized understanding of the proper use and possible adverse effects of spironolactone.  All of the patient's questions and concerns were addressed. Arava Pregnancy And Lactation Text: This medication is Pregnancy Category X and is absolutely contraindicated during pregnancy. It is unknown if it is excreted in breast milk. Benzoyl Peroxide Pregnancy And Lactation Text: This medication is Pregnancy Category C. It is unknown if benzoyl peroxide is excreted in breast milk. Cyclosporine Pregnancy And Lactation Text: This medication is Pregnancy Category C and it isn't know if it is safe during pregnancy. This medication is excreted in breast milk. Tazorac Pregnancy And Lactation Text: This medication is not safe during pregnancy. It is unknown if this medication is excreted in breast milk. Stelara Counseling:  I discussed with the patient the risks of ustekinumab including but not limited to immunosuppression, malignancy, posterior leukoencephalopathy syndrome, and serious infections.  The patient understands that monitoring is required including a PPD at baseline and must alert us or the primary physician if symptoms of infection or other concerning signs are noted. Dupixent Counseling: I discussed with the patient the risks of dupilumab including but not limited to eye infection and irritation, cold sores, injection site reactions, worsening of asthma, allergic reactions and increased risk of parasitic infection.  Live vaccines should be avoided while taking dupilumab. Dupilumab will also interact with certain medications such as warfarin and cyclosporine. The patient understands that monitoring is required and they must alert us or the primary physician if symptoms of infection or other concerning signs are noted. Libtayo Counseling- I discussed with the patient the risks of Libtayo including but not limited to nausea, vomiting, diarrhea, and bone or muscle pain.  The patient verbalized understanding of the proper use and possible adverse effects of Libtayo.  All of the patient's questions and concerns were addressed. Carac Counseling:  I discussed with the patient the risks of Carac including but not limited to erythema, scaling, itching, weeping, crusting, and pain. Cimetidine Counseling:  I discussed with the patient the risks of Cimetidine including but not limited to gynecomastia, headache, diarrhea, nausea, drowsiness, arrhythmias, pancreatitis, skin rashes, psychosis, bone marrow suppression and kidney toxicity. Clofazimine Counseling:  I discussed with the patient the risks of clofazimine including but not limited to skin and eye pigmentation, liver damage, nausea/vomiting, gastrointestinal bleeding and allergy. Topical Clindamycin Counseling: Patient counseled that this medication may cause skin irritation or allergic reactions.  In the event of skin irritation, the patient was advised to reduce the amount of the drug applied or use it less frequently.   The patient verbalized understanding of the proper use and possible adverse effects of clindamycin.  All of the patient's questions and concerns were addressed. Spironolactone Pregnancy And Lactation Text: This medication can cause feminization of the male fetus and should be avoided during pregnancy. The active metabolite is also found in breast milk. Rifampin Counseling: I discussed with the patient the risks of rifampin including but not limited to liver damage, kidney damage, red-orange body fluids, nausea/vomiting and severe allergy. Imiquimod Counseling:  I discussed with the patient the risks of imiquimod including but not limited to erythema, scaling, itching, weeping, crusting, and pain.  Patient understands that the inflammatory response to imiquimod is variable from person to person and was educated regarded proper titration schedule.  If flu-like symptoms develop, patient knows to discontinue the medication and contact us. Methotrexate Counseling:  Patient counseled regarding adverse effects of methotrexate including but not limited to nausea, vomiting, abnormalities in liver function tests. Patients may develop mouth sores, rash, diarrhea, and abnormalities in blood counts. The patient understands that monitoring is required including LFT's and blood counts.  There is a rare possibility of scarring of the liver and lung problems that can occur when taking methotrexate. Persistent nausea, loss of appetite, pale stools, dark urine, cough, and shortness of breath should be reported immediately. Patient advised to discontinue methotrexate treatment at least three months before attempting to become pregnant.  I discussed the need for folate supplements while taking methotrexate.  These supplements can decrease side effects during methotrexate treatment. The patient verbalized understanding of the proper use and possible adverse effects of methotrexate.  All of the patient's questions and concerns were addressed. Libtayo Pregnancy And Lactation Text: This medication is contraindicated in pregnancy and when breast feeding. Azithromycin Counseling:  I discussed with the patient the risks of azithromycin including but not limited to GI upset, allergic reaction, drug rash, diarrhea, and yeast infections. Dupixent Pregnancy And Lactation Text: This medication likely crosses the placenta but the risk for the fetus is uncertain. This medication is excreted in breast milk. Rifampin Pregnancy And Lactation Text: This medication is Pregnancy Category C and it isn't know if it is safe during pregnancy. It is also excreted in breast milk and should not be used if you are breast feeding. Niacinamide Counseling: I recommended taking niacin or niacinamide, also know as vitamin B3, twice daily. Recent evidence suggests that taking vitamin B3 (500 mg twice daily) can reduce the risk of actinic keratoses and non-melanoma skin cancers. Side effects of vitamin B3 include flushing and headache. Methotrexate Pregnancy And Lactation Text: This medication is Pregnancy Category X and is known to cause fetal harm. This medication is excreted in breast milk. Colchicine Counseling:  Patient counseled regarding adverse effects including but not limited to stomach upset (nausea, vomiting, stomach pain, or diarrhea).  Patient instructed to limit alcohol consumption while taking this medication.  Colchicine may reduce blood counts especially with prolonged use.  The patient understands that monitoring of kidney function and blood counts may be required, especially at baseline. The patient verbalized understanding of the proper use and possible adverse effects of colchicine.  All of the patient's questions and concerns were addressed. SSKI Counseling:  I discussed with the patient the risks of SSKI including but not limited to thyroid abnormalities, metallic taste, GI upset, fever, headache, acne, arthralgias, paraesthesias, lymphadenopathy, easy bleeding, arrhythmias, and allergic reaction. Taltz Counseling: I discussed with the patient the risks of ixekizumab including but not limited to immunosuppression, serious infections, worsening of inflammatory bowel disease and drug reactions.  The patient understands that monitoring is required including a PPD at baseline and must alert us or the primary physician if symptoms of infection or other concerning signs are noted. Azithromycin Pregnancy And Lactation Text: This medication is considered safe during pregnancy and is also secreted in breast milk. Enbrel Counseling:  I discussed with the patient the risks of etanercept including but not limited to myelosuppression, immunosuppression, autoimmune hepatitis, demyelinating diseases, lymphoma, and infections.  The patient understands that monitoring is required including a PPD at baseline and must alert us or the primary physician if symptoms of infection or other concerning signs are noted. Calcipotriene Counseling:  I discussed with the patient the risks of calcipotriene including but not limited to erythema, scaling, itching, and irritation. Prednisone Counseling:  I discussed with the patient the risks of prolonged use of prednisone including but not limited to weight gain, insomnia, osteoporosis, mood changes, diabetes, susceptibility to infection, glaucoma and high blood pressure.  In cases where prednisone use is prolonged, patients should be monitored with blood pressure checks, serum glucose levels and an eye exam.  Additionally, the patient may need to be placed on GI prophylaxis, PCP prophylaxis, and calcium and vitamin D supplementation and/or a bisphosphonate.  The patient verbalized understanding of the proper use and the possible adverse effects of prednisone.  All of the patient's questions and concerns were addressed. Topical Ketoconazole Counseling: Patient counseled that this medication may cause skin irritation or allergic reactions.  In the event of skin irritation, the patient was advised to reduce the amount of the drug applied or use it less frequently.   The patient verbalized understanding of the proper use and possible adverse effects of ketoconazole.  All of the patient's questions and concerns were addressed. Sarecycline Counseling: Patient advised regarding possible photosensitivity and discoloration of the teeth, skin, lips, tongue and gums.  Patient instructed to avoid sunlight, if possible.  When exposed to sunlight, patients should wear protective clothing, sunglasses, and sunscreen.  The patient was instructed to call the office immediately if the following severe adverse effects occur:  hearing changes, easy bruising/bleeding, severe headache, or vision changes.  The patient verbalized understanding of the proper use and possible adverse effects of sarecycline.  All of the patient's questions and concerns were addressed. Doxepin Counseling:  Patient advised that the medication is sedating and not to drive a car after taking this medication. Patient informed of potential adverse effects including but not limited to dry mouth, urinary retention, and blurry vision.  The patient verbalized understanding of the proper use and possible adverse effects of doxepin.  All of the patient's questions and concerns were addressed. Sski Pregnancy And Lactation Text: This medication is Pregnancy Category D and isn't considered safe during pregnancy. It is excreted in breast milk. Minoxidil Counseling: Minoxidil is a topical medication which can increase blood flow where it is applied. It is uncertain how this medication increases hair growth. Side effects are uncommon and include stinging and allergic reactions. Bactrim Counseling:  I discussed with the patient the risks of sulfa antibiotics including but not limited to GI upset, allergic reaction, drug rash, diarrhea, dizziness, photosensitivity, and yeast infections.  Rarely, more serious reactions can occur including but not limited to aplastic anemia, agranulocytosis, methemoglobinemia, blood dyscrasias, liver or kidney failure, lung infiltrates or desquamative/blistering drug rashes. Niacinamide Pregnancy And Lactation Text: These medications are considered safe during pregnancy. Doxepin Pregnancy And Lactation Text: This medication is Pregnancy Category C and it isn't known if it is safe during pregnancy. It is also excreted in breast milk and breast feeding isn't recommended. Thalidomide Counseling: I discussed with the patient the risks of thalidomide including but not limited to birth defects, anxiety, weakness, chest pain, dizziness, cough and severe allergy. Bactrim Pregnancy And Lactation Text: This medication is Pregnancy Category D and is known to cause fetal risk.  It is also excreted in breast milk. Nsaids Counseling: NSAID Counseling: I discussed with the patient that NSAIDs should be taken with food. Prolonged use of NSAIDs can result in the development of stomach ulcers.  Patient advised to stop taking NSAIDs if abdominal pain occurs.  The patient verbalized understanding of the proper use and possible adverse effects of NSAIDs.  All of the patient's questions and concerns were addressed. Tremfya Counseling: I discussed with the patient the risks of guselkumab including but not limited to immunosuppression, serious infections, and drug reactions.  The patient understands that monitoring is required including a PPD at baseline and must alert us or the primary physician if symptoms of infection or other concerning signs are noted. Humira Counseling:  I discussed with the patient the risks of adalimumab including but not limited to myelosuppression, immunosuppression, autoimmune hepatitis, demyelinating diseases, lymphoma, and serious infections.  The patient understands that monitoring is required including a PPD at baseline and must alert us or the primary physician if symptoms of infection or other concerning signs are noted. Topical Sulfur Applications Counseling: Topical Sulfur Counseling: Patient counseled that this medication may cause skin irritation or allergic reactions.  In the event of skin irritation, the patient was advised to reduce the amount of the drug applied or use it less frequently.   The patient verbalized understanding of the proper use and possible adverse effects of topical sulfur application.  All of the patient's questions and concerns were addressed. Hydroxyzine Counseling: Patient advised that the medication is sedating and not to drive a car after taking this medication.  Patient informed of potential adverse effects including but not limited to dry mouth, urinary retention, and blurry vision.  The patient verbalized understanding of the proper use and possible adverse effects of hydroxyzine.  All of the patient's questions and concerns were addressed. Dapsone Counseling: I discussed with the patient the risks of dapsone including but not limited to hemolytic anemia, agranulocytosis, rashes, methemoglobinemia, kidney failure, peripheral neuropathy, headaches, GI upset, and liver toxicity.  Patients who start dapsone require monitoring including baseline LFTs and weekly CBCs for the first month, then every month thereafter.  The patient verbalized understanding of the proper use and possible adverse effects of dapsone.  All of the patient's questions and concerns were addressed. Tetracycline Counseling: Patient counseled regarding possible photosensitivity and increased risk for sunburn.  Patient instructed to avoid sunlight, if possible.  When exposed to sunlight, patients should wear protective clothing, sunglasses, and sunscreen.  The patient was instructed to call the office immediately if the following severe adverse effects occur:  hearing changes, easy bruising/bleeding, severe headache, or vision changes.  The patient verbalized understanding of the proper use and possible adverse effects of tetracycline.  All of the patient's questions and concerns were addressed. Patient understands to avoid pregnancy while on therapy due to potential birth defects. Mirvaso Counseling: Mirvaso is a topical medication which can decrease superficial blood flow where applied. Side effects are uncommon and include stinging, redness and allergic reactions. Cephalexin Counseling: I counseled the patient regarding use of cephalexin as an antibiotic for prophylactic and/or therapeutic purposes. Cephalexin (commonly prescribed under brand name Keflex) is a cephalosporin antibiotic which is active against numerous classes of bacteria, including most skin bacteria. Side effects may include nausea, diarrhea, gastrointestinal upset, rash, hives, yeast infections, and in rare cases, hepatitis, kidney disease, seizures, fever, confusion, neurologic symptoms, and others. Patients with severe allergies to penicillin medications are cautioned that there is about a 10% incidence of cross-reactivity with cephalosporins. When possible, patients with penicillin allergies should use alternatives to cephalosporins for antibiotic therapy. Nsaids Pregnancy And Lactation Text: These medications are considered safe up to 30 weeks gestation. It is excreted in breast milk. Acitretin Counseling:  I discussed with the patient the risks of acitretin including but not limited to hair loss, dry lips/skin/eyes, liver damage, hyperlipidemia, depression/suicidal ideation, photosensitivity.  Serious rare side effects can include but are not limited to pancreatitis, pseudotumor cerebri, bony changes, clot formation/stroke/heart attack.  Patient understands that alcohol is contraindicated since it can result in liver toxicity and significantly prolong the elimination of the drug by many years. Topical Sulfur Applications Pregnancy And Lactation Text: This medication is considered safe during pregnancy and breast feeding secondary to limited systemic absorption. Dapsone Pregnancy And Lactation Text: This medication is Pregnancy Category C and is not considered safe during pregnancy or breast feeding. Xeljanz Counseling: I discussed with the patient the risks of Xeljanz therapy including increased risk of infection, liver issues, headache, diarrhea, or cold symptoms. Live vaccines should be avoided. They were instructed to call if they have any problems. Hydroxyzine Pregnancy And Lactation Text: This medication is not safe during pregnancy and should not be taken. It is also excreted in breast milk and breast feeding isn't recommended. Tranexamic Acid Counseling:  Patient advised of the small risk of bleeding problems with tranexamic acid. They were also instructed to call if they developed any nausea, vomiting or diarrhea. All of the patient's questions and concerns were addressed. Cephalexin Pregnancy And Lactation Text: This medication is Pregnancy Category B and considered safe during pregnancy.  It is also excreted in breast milk but can be used safely for shorter doses. Odomzo Counseling- I discussed with the patient the risks of Odomzo including but not limited to nausea, vomiting, diarrhea, constipation, weight loss, changes in the sense of taste, decreased appetite, muscle spasms, and hair loss.  The patient verbalized understanding of the proper use and possible adverse effects of Odomzo.  All of the patient's questions and concerns were addressed. Dutasteride Male Counseling: Dustasteride Counseling:  I discussed with the patient the risks of use of dutasteride including but not limited to decreased libido, decreased ejaculate volume, and gynecomastia. Women who can become pregnant should not handle medication.  All of the patient's questions and concerns were addressed. Ilumya Counseling: I discussed with the patient the risks of tildrakizumab including but not limited to immunosuppression, malignancy, posterior leukoencephalopathy syndrome, and serious infections.  The patient understands that monitoring is required including a PPD at baseline and must alert us or the primary physician if symptoms of infection or other concerning signs are noted. Picato Counseling:  I discussed with the patient the risks of Picato including but not limited to erythema, scaling, itching, weeping, crusting, and pain. Tranexamic Acid Pregnancy And Lactation Text: It is unknown if this medication is safe during pregnancy or breast feeding. Xelevanz Pregnancy And Lactation Text: This medication is Pregnancy Category D and is not considered safe during pregnancy.  The risk during breast feeding is also uncertain. Wartpeel Counseling:  I discussed with the patient the risks of Wartpeel including but not limited to erythema, scaling, itching, weeping, crusting, and pain. Acitretin Pregnancy And Lactation Text: This medication is Pregnancy Category X and should not be given to women who are pregnant or may become pregnant in the future. This medication is excreted in breast milk. Clindamycin Counseling: I counseled the patient regarding use of clindamycin as an antibiotic for prophylactic and/or therapeutic purposes. Clindamycin is active against numerous classes of bacteria, including skin bacteria. Side effects may include nausea, diarrhea, gastrointestinal upset, rash, hives, yeast infections, and in rare cases, colitis. Detail Level: Detailed Valtrex Counseling: I discussed with the patient the risks of valacyclovir including but not limited to kidney damage, nausea, vomiting and severe allergy.  The patient understands that if the infection seems to be worsening or is not improving, they are to call. Albendazole Counseling:  I discussed with the patient the risks of albendazole including but not limited to cytopenia, kidney damage, nausea/vomiting and severe allergy.  The patient understands that this medication is being used in an off-label manner. Dutasteride Pregnancy And Lactation Text: This medication is absolutely contraindicated in women, especially during pregnancy and breast feeding. Feminization of male fetuses is possible if taking while pregnant. Bexarotene Counseling:  I discussed with the patient the risks of bexarotene including but not limited to hair loss, dry lips/skin/eyes, liver abnormalities, hyperlipidemia, pancreatitis, depression/suicidal ideation, photosensitivity, drug rash/allergic reactions, hypothyroidism, anemia, leukopenia, infection, cataracts, and teratogenicity.  Patient understands that they will need regular blood tests to check lipid profile, liver function tests, white blood cell count, thyroid function tests and pregnancy test if applicable. Fluconazole Counseling:  Patient counseled regarding adverse effects of fluconazole including but not limited to headache, diarrhea, nausea, upset stomach, liver function test abnormalities, taste disturbance, and stomach pain.  There is a rare possibility of liver failure that can occur when taking fluconazole.  The patient understands that monitoring of LFTs and kidney function test may be required, especially at baseline. The patient verbalized understanding of the proper use and possible adverse effects of fluconazole.  All of the patient's questions and concerns were addressed. Infliximab Counseling:  I discussed with the patient the risks of infliximab including but not limited to myelosuppression, immunosuppression, autoimmune hepatitis, demyelinating diseases, lymphoma, and serious infections.  The patient understands that monitoring is required including a PPD at baseline and must alert us or the primary physician if symptoms of infection or other concerning signs are noted. Calcipotriene Pregnancy And Lactation Text: This medication has not been proven safe during pregnancy. It is unknown if this medication is excreted in breast milk. Oral Minoxidil Counseling- I discussed with the patient the risks of oral minoxidil including but not limited to shortness of breath, swelling of the feet or ankles, dizziness, lightheadedness, unwanted hair growth and allergic reaction.  The patient verbalized understanding of the proper use and possible adverse effects of oral minoxidil.  All of the patient's questions and concerns were addressed. Clindamycin Pregnancy And Lactation Text: This medication can be used in pregnancy if certain situations. Clindamycin is also present in breast milk. Xolair Counseling:  Patient informed of potential adverse effects including but not limited to fever, muscle aches, rash and allergic reactions.  The patient verbalized understanding of the proper use and possible adverse effects of Xolair.  All of the patient's questions and concerns were addressed. Erivedge Counseling- I discussed with the patient the risks of Erivedge including but not limited to nausea, vomiting, diarrhea, constipation, weight loss, changes in the sense of taste, decreased appetite, muscle spasms, and hair loss.  The patient verbalized understanding of the proper use and possible adverse effects of Erivedge.  All of the patient's questions and concerns were addressed. Winlevi Counseling:  I discussed with the patient the risks of topical clascoterone including but not limited to erythema, scaling, itching, and stinging. Patient voiced their understanding. Valtrex Pregnancy And Lactation Text: this medication is Pregnancy Category B and is considered safe during pregnancy. This medication is not directly found in breast milk but it's metabolite acyclovir is present. 5-Fu Counseling: 5-Fluorouracil Counseling:  I discussed with the patient the risks of 5-fluorouracil including but not limited to erythema, scaling, itching, weeping, crusting, and pain. Protopic Counseling: Patient may experience a mild burning sensation during topical application. Protopic is not approved in children less than 2 years of age. There have been case reports of hematologic and skin malignancies in patients using topical calcineurin inhibitors although causality is questionable. Bexarotene Pregnancy And Lactation Text: This medication is Pregnancy Category X and should not be given to women who are pregnant or may become pregnant. This medication should not be used if you are breast feeding. Oral Minoxidil Pregnancy And Lactation Text: This medication should only be used when clearly needed if you are pregnant, attempting to become pregnant or breast feeding. Doxycycline Counseling:  Patient counseled regarding possible photosensitivity and increased risk for sunburn.  Patient instructed to avoid sunlight, if possible.  When exposed to sunlight, patients should wear protective clothing, sunglasses, and sunscreen.  The patient was instructed to call the office immediately if the following severe adverse effects occur:  hearing changes, easy bruising/bleeding, severe headache, or vision changes.  The patient verbalized understanding of the proper use and possible adverse effects of doxycycline.  All of the patient's questions and concerns were addressed. Xolair Pregnancy And Lactation Text: This medication is Pregnancy Category B and is considered safe during pregnancy. This medication is excreted in breast milk. Ivermectin Counseling:  Patient instructed to take medication on an empty stomach with a full glass of water.  Patient informed of potential adverse effects including but not limited to nausea, diarrhea, dizziness, itching, and swelling of the extremities or lymph nodes.  The patient verbalized understanding of the proper use and possible adverse effects of ivermectin.  All of the patient's questions and concerns were addressed. Winlevi Pregnancy And Lactation Text: This medication is considered safe during pregnancy and breastfeeding. Otezla Counseling: The side effects of Otezla were discussed with the patient, including but not limited to worsening or new depression, weight loss, diarrhea, nausea, upper respiratory tract infection, and headache. Patient instructed to call the office should any adverse effect occur.  The patient verbalized understanding of the proper use and possible adverse effects of Otezla.  All the patient's questions and concerns were addressed. Griseofulvin Counseling:  I discussed with the patient the risks of griseofulvin including but not limited to photosensitivity, cytopenia, liver damage, nausea/vomiting and severe allergy.  The patient understands that this medication is best absorbed when taken with a fatty meal (e.g., ice cream or french fries). Isotretinoin Counseling: Patient should get monthly blood tests, not donate blood, not drive at night if vision affected, not share medication, and not undergo elective surgery for 6 months after tx completed. Side effects reviewed, pt to contact office should one occur. Doxycycline Pregnancy And Lactation Text: This medication is Pregnancy Category D and not consider safe during pregnancy. It is also excreted in breast milk but is considered safe for shorter treatment courses. Protopic Pregnancy And Lactation Text: This medication is Pregnancy Category C. It is unknown if this medication is excreted in breast milk when applied topically.

## 2022-10-12 NOTE — PATIENT PROFILE ADULT - NSPROIMPLANTSMEDDEV_GEN_A_NUR
Quinolones Pregnancy And Lactation Text: This medication is Pregnancy Category C and it isn't know if it is safe during pregnancy. It is also excreted in breast milk. Cimzia Pregnancy And Lactation Text: This medication crosses the placenta but can be considered safe in certain situations. Cimzia may be excreted in breast milk. Aklief counseling:  Patient advised to apply a pea-sized amount only at bedtime and wait 30 minutes after washing their face before applying.  If too drying, patient may add a non-comedogenic moisturizer.  The most commonly reported side effects including irritation, redness, scaling, dryness, stinging, burning, itching, and increased risk of sunburn.  The patient verbalized understanding of the proper use and possible adverse effects of retinoids.  All of the patient's questions and concerns were addressed. Valtrex Pregnancy And Lactation Text: this medication is Pregnancy Category B and is considered safe during pregnancy. This medication is not directly found in breast milk but it's metabolite acyclovir is present. Mirvaso Counseling: Mirvaso is a topical medication which can decrease superficial blood flow where applied. Side effects are uncommon and include stinging, redness and allergic reactions. Wartpeel Pregnancy And Lactation Text: This medication is Pregnancy Category X and contraindicated in pregnancy and in women who may become pregnant. It is unknown if this medication is excreted in breast milk. Doxycycline Counseling:  Patient counseled regarding possible photosensitivity and increased risk for sunburn.  Patient instructed to avoid sunlight, if possible.  When exposed to sunlight, patients should wear protective clothing, sunglasses, and sunscreen.  The patient was instructed to call the office immediately if the following severe adverse effects occur:  hearing changes, easy bruising/bleeding, severe headache, or vision changes.  The patient verbalized understanding of the proper use and possible adverse effects of doxycycline.  All of the patient's questions and concerns were addressed. Qbrexza Pregnancy And Lactation Text: There is no available data on Qbrexza use in pregnant women.  There is no available data on Qbrexza use in lactation. Arava Pregnancy And Lactation Text: This medication is Pregnancy Category X and is absolutely contraindicated during pregnancy. It is unknown if it is excreted in breast milk. Oral Minoxidil Pregnancy And Lactation Text: This medication should only be used when clearly needed if you are pregnant, attempting to become pregnant or breast feeding. Taltz Pregnancy And Lactation Text: The risk during pregnancy and breastfeeding is uncertain with this medication. Albendazole Pregnancy And Lactation Text: This medication is Pregnancy Category C and it isn't known if it is safe during pregnancy. It is also excreted in breast milk. Eucrisa Pregnancy And Lactation Text: This medication has not been assigned a Pregnancy Risk Category but animal studies failed to show danger with the topical medication. It is unknown if the medication is excreted in breast milk. None Cantharidin Counseling: Calcipotriene Counseling:  I discussed with the patient the risks of calcipotriene including but not limited to erythema, scaling, itching, and irritation. Griseofulvin Counseling:  I discussed with the patient the risks of griseofulvin including but not limited to photosensitivity, cytopenia, liver damage, nausea/vomiting and severe allergy.  The patient understands that this medication is best absorbed when taken with a fatty meal (e.g., ice cream or french fries). Spironolactone Counseling: Patient advised regarding risks of diarrhea, abdominal pain, hyperkalemia, birth defects (for female patients), liver toxicity and renal toxicity. The patient may need blood work to monitor liver and kidney function and potassium levels while on therapy. The patient verbalized understanding of the proper use and possible adverse effects of spironolactone.  All of the patient's questions and concerns were addressed. Acitretin Pregnancy And Lactation Text: This medication is Pregnancy Category X and should not be given to women who are pregnant or may become pregnant in the future. This medication is excreted in breast milk. Libtayo Counseling- I discussed with the patient the risks of Libtayo including but not limited to nausea, vomiting, diarrhea, and bone or muscle pain.  The patient verbalized understanding of the proper use and possible adverse effects of Libtayo.  All of the patient's questions and concerns were addressed. Siliq Counseling:  I discussed with the patient the risks of Siliq including but not limited to new or worsening depression, suicidal thoughts and behavior, immunosuppression, malignancy, posterior leukoencephalopathy syndrome, and serious infections.  The patient understands that monitoring is required including a PPD at baseline and must alert us or the primary physician if symptoms of infection or other concerning signs are noted. There is also a special program designed to monitor depression which is required with Siliq. Winlevi Counseling:  I discussed with the patient the risks of topical clascoterone including but not limited to erythema, scaling, itching, and stinging. Patient voiced their understanding. Topical Clindamycin Pregnancy And Lactation Text: This medication is Pregnancy Category B and is considered safe during pregnancy. It is unknown if it is excreted in breast milk. Terbinafine Pregnancy And Lactation Text: This medication is Pregnancy Category B and is considered safe during pregnancy. It is also excreted in breast milk and breast feeding isn't recommended. Cellcept Counseling:  I discussed with the patient the risks of mycophenolate mofetil including but not limited to infection/immunosuppression, GI upset, hypokalemia, hypercholesterolemia, bone marrow suppression, lymphoproliferative disorders, malignancy, GI ulceration/bleed/perforation, colitis, interstitial lung disease, kidney failure, progressive multifocal leukoencephalopathy, and birth defects.  The patient understands that monitoring is required including a baseline creatinine and regular CBC testing. In addition, patient must alert us immediately if symptoms of infection or other concerning signs are noted. Use Enhanced Medication Counseling?: No Rifampin Counseling: I discussed with the patient the risks of rifampin including but not limited to liver damage, kidney damage, red-orange body fluids, nausea/vomiting and severe allergy. Mirvaso Pregnancy And Lactation Text: This medication has not been assigned a Pregnancy Risk Category. It is unknown if the medication is excreted in breast milk. Aklief Pregnancy And Lactation Text: It is unknown if this medication is safe to use during pregnancy.  It is unknown if this medication is excreted in breast milk.  Breastfeeding women should use the topical cream on the smallest area of the skin for the shortest time needed while breastfeeding.  Do not apply to nipple and areola. Clofazimine Counseling:  I discussed with the patient the risks of clofazimine including but not limited to skin and eye pigmentation, liver damage, nausea/vomiting, gastrointestinal bleeding and allergy. Hydroquinone Counseling:  Patient advised that medication may result in skin irritation, lightening (hypopigmentation), dryness, and burning.  In the event of skin irritation, the patient was advised to reduce the amount of the drug applied or use it less frequently.  Rarely, spots that are treated with hydroquinone can become darker (pseudoochronosis).  Should this occur, patient instructed to stop medication and call the office. The patient verbalized understanding of the proper use and possible adverse effects of hydroquinone.  All of the patient's questions and concerns were addressed. Rhofade Counseling: Rhofade is a topical medication which can decrease superficial blood flow where applied. Side effects are uncommon and include stinging, redness and allergic reactions. Otezla Counseling: The side effects of Otezla were discussed with the patient, including but not limited to worsening or new depression, weight loss, diarrhea, nausea, upper respiratory tract infection, and headache. Patient instructed to call the office should any adverse effect occur.  The patient verbalized understanding of the proper use and possible adverse effects of Otezla.  All the patient's questions and concerns were addressed. Cosentyx Counseling:  I discussed with the patient the risks of Cosentyx including but not limited to worsening of Crohn's disease, immunosuppression, allergic reactions and infections.  The patient understands that monitoring is required including a PPD at baseline and must alert us or the primary physician if symptoms of infection or other concerning signs are noted. Libtayo Pregnancy And Lactation Text: This medication is contraindicated in pregnancy and when breast feeding. Ivermectin Counseling:  Patient instructed to take medication on an empty stomach with a full glass of water.  Patient informed of potential adverse effects including but not limited to nausea, diarrhea, dizziness, itching, and swelling of the extremities or lymph nodes.  The patient verbalized understanding of the proper use and possible adverse effects of ivermectin.  All of the patient's questions and concerns were addressed. Doxycycline Pregnancy And Lactation Text: This medication is Pregnancy Category D and not consider safe during pregnancy. It is also excreted in breast milk but is considered safe for shorter treatment courses. Spironolactone Pregnancy And Lactation Text: This medication can cause feminization of the male fetus and should be avoided during pregnancy. The active metabolite is also found in breast milk. Azithromycin Counseling:  I discussed with the patient the risks of azithromycin including but not limited to GI upset, allergic reaction, drug rash, diarrhea, and yeast infections. Infliximab Counseling:  I discussed with the patient the risks of infliximab including but not limited to myelosuppression, immunosuppression, autoimmune hepatitis, demyelinating diseases, lymphoma, and serious infections.  The patient understands that monitoring is required including a PPD at baseline and must alert us or the primary physician if symptoms of infection or other concerning signs are noted. Cantharidin Pregnancy And Lactation Text: The use of this medication during pregnancy or lactation is not recommended as there is insufficient data. Griseofulvin Pregnancy And Lactation Text: This medication is Pregnancy Category X and is known to cause serious birth defects. It is unknown if this medication is excreted in breast milk but breast feeding should be avoided. Tremfya Counseling: I discussed with the patient the risks of guselkumab including but not limited to immunosuppression, serious infections, worsening of inflammatory bowel disease and drug reactions.  The patient understands that monitoring is required including a PPD at baseline and must alert us or the primary physician if symptoms of infection or other concerning signs are noted. Bexarotene Counseling:  I discussed with the patient the risks of bexarotene including but not limited to hair loss, dry lips/skin/eyes, liver abnormalities, hyperlipidemia, pancreatitis, depression/suicidal ideation, photosensitivity, drug rash/allergic reactions, hypothyroidism, anemia, leukopenia, infection, cataracts, and teratogenicity.  Patient understands that they will need regular blood tests to check lipid profile, liver function tests, white blood cell count, thyroid function tests and pregnancy test if applicable. Azelaic Acid Counseling: Patient counseled that medicine may cause skin irritation and to avoid applying near the eyes.  In the event of skin irritation, the patient was advised to reduce the amount of the drug applied or use it less frequently.   The patient verbalized understanding of the proper use and possible adverse effects of azelaic acid.  All of the patient's questions and concerns were addressed. Finasteride Counseling:  I discussed with the patient the risks of use of finasteride including but not limited to decreased libido, decreased ejaculate volume, gynecomastia, and depression. Women should not handle medication.  All of the patient's questions and concerns were addressed. Cellcept Pregnancy And Lactation Text: This medication is Pregnancy Category D and isn't considered safe during pregnancy. It is unknown if this medication is excreted in breast milk. Cosentyx Pregnancy And Lactation Text: This medication is Pregnancy Category B and is considered safe during pregnancy. It is unknown if this medication is excreted in breast milk. Clofazimine Pregnancy And Lactation Text: This medication is Pregnancy Category C and isn't considered safe during pregnancy. It is excreted in breast milk. Winlevi Pregnancy And Lactation Text: This medication is considered safe during pregnancy and breastfeeding. Opzelura Counseling:  I discussed with the patient the risks of Opzelura including but not limited to nasopharngitis, bronchitis, ear infection, eosinophila, hives, diarrhea, folliculitis, tonsillitis, and rhinorrhea.  Taken orally, this medication has been linked to serious infections; higher rate of mortality; malignancy and lymphoproliferative disorders; major adverse cardiovascular events; thrombosis; thrombocytopenia, anemia, and neutropenia; and lipid elevations. Erythromycin Counseling:  I discussed with the patient the risks of erythromycin including but not limited to GI upset, allergic reaction, drug rash, diarrhea, increase in liver enzymes, and yeast infections. SSKI Counseling:  I discussed with the patient the risks of SSKI including but not limited to thyroid abnormalities, metallic taste, GI upset, fever, headache, acne, arthralgias, paraesthesias, lymphadenopathy, easy bleeding, arrhythmias, and allergic reaction. Cimetidine Counseling:  I discussed with the patient the risks of Cimetidine including but not limited to gynecomastia, headache, diarrhea, nausea, drowsiness, arrhythmias, pancreatitis, skin rashes, psychosis, bone marrow suppression and kidney toxicity. Azithromycin Pregnancy And Lactation Text: This medication is considered safe during pregnancy and is also secreted in breast milk. Rifampin Pregnancy And Lactation Text: This medication is Pregnancy Category C and it isn't know if it is safe during pregnancy. It is also excreted in breast milk and should not be used if you are breast feeding. Cyclosporine Counseling:  I discussed with the patient the risks of cyclosporine including but not limited to hypertension, gingival hyperplasia,myelosuppression, immunosuppression, liver damage, kidney damage, neurotoxicity, lymphoma, and serious infections. The patient understands that monitoring is required including baseline blood pressure, CBC, CMP, lipid panel and uric acid, and then 1-2 times monthly CMP and blood pressure. Niacinamide Counseling: I recommended taking niacin or niacinamide, also know as vitamin B3, twice daily. Recent evidence suggests that taking vitamin B3 (500 mg twice daily) can reduce the risk of actinic keratoses and non-melanoma skin cancers. Side effects of vitamin B3 include flushing and headache. Simponi Counseling:  I discussed with the patient the risks of golimumab including but not limited to myelosuppression, immunosuppression, autoimmune hepatitis, demyelinating diseases, lymphoma, and serious infections.  The patient understands that monitoring is required including a PPD at baseline and must alert us or the primary physician if symptoms of infection or other concerning signs are noted. 5-Fu Counseling: 5-Fluorouracil Counseling:  I discussed with the patient the risks of 5-fluorouracil including but not limited to erythema, scaling, itching, weeping, crusting, and pain. Itraconazole Counseling:  I discussed with the patient the risks of itraconazole including but not limited to liver damage, nausea/vomiting, neuropathy, and severe allergy.  The patient understands that this medication is best absorbed when taken with acidic beverages such as non-diet cola or ginger ale.  The patient understands that monitoring is required including baseline LFTs and repeat LFTs at intervals.  The patient understands that they are to contact us or the primary physician if concerning signs are noted. Azelaic Acid Pregnancy And Lactation Text: This medication is considered safe during pregnancy and breast feeding. Finasteride Pregnancy And Lactation Text: This medication is absolutely contraindicated during pregnancy. It is unknown if it is excreted in breast milk. Bexarotene Pregnancy And Lactation Text: This medication is Pregnancy Category X and should not be given to women who are pregnant or may become pregnant. This medication should not be used if you are breast feeding. Otezla Pregnancy And Lactation Text: This medication is Pregnancy Category C and it isn't known if it is safe during pregnancy. It is unknown if it is excreted in breast milk. Zyclara Counseling:  I discussed with the patient the risks of imiquimod including but not limited to erythema, scaling, itching, weeping, crusting, and pain.  Patient understands that the inflammatory response to imiquimod is variable from person to person and was educated regarded proper titration schedule.  If flu-like symptoms develop, patient knows to discontinue the medication and contact us. Cyclophosphamide Counseling:  I discussed with the patient the risks of cyclophosphamide including but not limited to hair loss, hormonal abnormalities, decreased fertility, abdominal pain, diarrhea, nausea and vomiting, bone marrow suppression and infection. The patient understands that monitoring is required while taking this medication. Dupixent Counseling: I discussed with the patient the risks of dupilumab including but not limited to eye infection and irritation, cold sores, injection site reactions, worsening of asthma, allergic reactions and increased risk of parasitic infection.  Live vaccines should be avoided while taking dupilumab. Dupilumab will also interact with certain medications such as warfarin and cyclosporine. The patient understands that monitoring is required and they must alert us or the primary physician if symptoms of infection or other concerning signs are noted. Colchicine Counseling:  Patient counseled regarding adverse effects including but not limited to stomach upset (nausea, vomiting, stomach pain, or diarrhea).  Patient instructed to limit alcohol consumption while taking this medication.  Colchicine may reduce blood counts especially with prolonged use.  The patient understands that monitoring of kidney function and blood counts may be required, especially at baseline. The patient verbalized understanding of the proper use and possible adverse effects of colchicine.  All of the patient's questions and concerns were addressed. Solaraze Counseling:  I discussed with the patient the risks of Solaraze including but not limited to erythema, scaling, itching, weeping, crusting, and pain. Sski Pregnancy And Lactation Text: This medication is Pregnancy Category D and isn't considered safe during pregnancy. It is excreted in breast milk. Sarecycline Counseling: Patient advised regarding possible photosensitivity and discoloration of the teeth, skin, lips, tongue and gums.  Patient instructed to avoid sunlight, if possible.  When exposed to sunlight, patients should wear protective clothing, sunglasses, and sunscreen.  The patient was instructed to call the office immediately if the following severe adverse effects occur:  hearing changes, easy bruising/bleeding, severe headache, or vision changes.  The patient verbalized understanding of the proper use and possible adverse effects of sarecycline.  All of the patient's questions and concerns were addressed. Imiquimod Counseling:  I discussed with the patient the risks of imiquimod including but not limited to erythema, scaling, itching, weeping, crusting, and pain.  Patient understands that the inflammatory response to imiquimod is variable from person to person and was educated regarded proper titration schedule.  If flu-like symptoms develop, patient knows to discontinue the medication and contact us. Erythromycin Pregnancy And Lactation Text: This medication is Pregnancy Category B and is considered safe during pregnancy. It is also excreted in breast milk. Bactrim Counseling:  I discussed with the patient the risks of sulfa antibiotics including but not limited to GI upset, allergic reaction, drug rash, diarrhea, dizziness, photosensitivity, and yeast infections.  Rarely, more serious reactions can occur including but not limited to aplastic anemia, agranulocytosis, methemoglobinemia, blood dyscrasias, liver or kidney failure, lung infiltrates or desquamative/blistering drug rashes. Xeljanz Counseling: I discussed with the patient the risks of Xeljanz therapy including increased risk of infection, liver issues, headache, diarrhea, or cold symptoms. Live vaccines should be avoided. They were instructed to call if they have any problems. Cyclosporine Pregnancy And Lactation Text: This medication is Pregnancy Category C and it isn't know if it is safe during pregnancy. This medication is excreted in breast milk. Opzelura Pregnancy And Lactation Text: There is insufficient data to evaluate drug-associated risk for major birth defects, miscarriage, or other adverse maternal or fetal outcomes.  There is a pregnancy registry that monitors pregnancy outcomes in pregnant persons exposed to the medication during pregnancy.  It is unknown if this medication is excreted in breast milk.  Do not breastfeed during treatment and for about 4 weeks after the last dose. Niacinamide Pregnancy And Lactation Text: These medications are considered safe during pregnancy. Cyclophosphamide Pregnancy And Lactation Text: This medication is Pregnancy Category D and it isn't considered safe during pregnancy. This medication is excreted in breast milk. Isotretinoin Counseling: Patient should get monthly blood tests, not donate blood, not drive at night if vision affected, not share medication, and not undergo elective surgery for 6 months after tx completed. Side effects reviewed, pt to contact office should one occur. Olumiant Counseling: I discussed with the patient the risks of Olumiant therapy including but not limited to upper respiratory tract infections, shingles, cold sores, and nausea. Live vaccines should be avoided.  This medication has been linked to serious infections; higher rate of mortality; malignancy and lymphoproliferative disorders; major adverse cardiovascular events; thrombosis; gastrointestinal perforations; neutropenia; lymphopenia; anemia; liver enzyme elevations; and lipid elevations. Gabapentin Counseling: I discussed with the patient the risks of gabapentin including but not limited to dizziness, somnolence, fatigue and ataxia. Benzoyl Peroxide Counseling: Patient counseled that medicine may cause skin irritation and bleach clothing.  In the event of skin irritation, the patient was advised to reduce the amount of the drug applied or use it less frequently.   The patient verbalized understanding of the proper use and possible adverse effects of benzoyl peroxide.  All of the patient's questions and concerns were addressed. Oxybutynin Counseling:  I discussed with the patient the risks of oxybutynin including but not limited to skin rash, drowsiness, dry mouth, difficulty urinating, and blurred vision. Dupixent Pregnancy And Lactation Text: This medication likely crosses the placenta but the risk for the fetus is uncertain. This medication is excreted in breast milk. Adbry Counseling: I discussed with the patient the risks of tralokinumab including but not limited to eye infection and irritation, cold sores, injection site reactions, worsening of asthma, allergic reactions and increased risk of parasitic infection.  Live vaccines should be avoided while taking tralokinumab. The patient understands that monitoring is required and they must alert us or the primary physician if symptoms of infection or other concerning signs are noted. Zyclara Pregnancy And Lactation Text: This medication is Pregnancy Category C. It is unknown if this medication is excreted in breast milk. Metronidazole Counseling:  I discussed with the patient the risks of metronidazole including but not limited to seizures, nausea/vomiting, a metallic taste in the mouth, nausea/vomiting and severe allergy. Doxepin Counseling:  Patient advised that the medication is sedating and not to drive a car after taking this medication. Patient informed of potential adverse effects including but not limited to dry mouth, urinary retention, and blurry vision.  The patient verbalized understanding of the proper use and possible adverse effects of doxepin.  All of the patient's questions and concerns were addressed. Solaraze Pregnancy And Lactation Text: This medication is Pregnancy Category B and is considered safe. There is some data to suggest avoiding during the third trimester. It is unknown if this medication is excreted in breast milk. Skyrizi Counseling: I discussed with the patient the risks of risankizumab-rzaa including but not limited to immunosuppression, and serious infections.  The patient understands that monitoring is required including a PPD at baseline and must alert us or the primary physician if symptoms of infection or other concerning signs are noted. Sarecycline Pregnancy And Lactation Text: This medication is Pregnancy Category D and not consider safe during pregnancy. It is also excreted in breast milk. Methotrexate Counseling:  Patient counseled regarding adverse effects of methotrexate including but not limited to nausea, vomiting, abnormalities in liver function tests. Patients may develop mouth sores, rash, diarrhea, and abnormalities in blood counts. The patient understands that monitoring is required including LFT's and blood counts.  There is a rare possibility of scarring of the liver and lung problems that can occur when taking methotrexate. Persistent nausea, loss of appetite, pale stools, dark urine, cough, and shortness of breath should be reported immediately. Patient advised to discontinue methotrexate treatment at least three months before attempting to become pregnant.  I discussed the need for folate supplements while taking methotrexate.  These supplements can decrease side effects during methotrexate treatment. The patient verbalized understanding of the proper use and possible adverse effects of methotrexate.  All of the patient's questions and concerns were addressed. Picato Counseling:  I discussed with the patient the risks of Picato including but not limited to erythema, scaling, itching, weeping, crusting, and pain. Thalidomide Counseling: I discussed with the patient the risks of thalidomide including but not limited to birth defects, anxiety, weakness, chest pain, dizziness, cough and severe allergy. Topical Ketoconazole Counseling: Patient counseled that this medication may cause skin irritation or allergic reactions.  In the event of skin irritation, the patient was advised to reduce the amount of the drug applied or use it less frequently.   The patient verbalized understanding of the proper use and possible adverse effects of ketoconazole.  All of the patient's questions and concerns were addressed. Drysol Counseling:  I discussed with the patient the risks of drysol/aluminum chloride including but not limited to skin rash, itching, irritation, burning. Bactrim Pregnancy And Lactation Text: This medication is Pregnancy Category D and is known to cause fetal risk.  It is also excreted in breast milk. Xelozz Pregnancy And Lactation Text: This medication is Pregnancy Category D and is not considered safe during pregnancy.  The risk during breast feeding is also uncertain. Isotretinoin Pregnancy And Lactation Text: This medication is Pregnancy Category X and is considered extremely dangerous during pregnancy. It is unknown if it is excreted in breast milk. Nsaids Counseling: NSAID Counseling: I discussed with the patient that NSAIDs should be taken with food. Prolonged use of NSAIDs can result in the development of stomach ulcers.  Patient advised to stop taking NSAIDs if abdominal pain occurs.  The patient verbalized understanding of the proper use and possible adverse effects of NSAIDs.  All of the patient's questions and concerns were addressed. Olumiant Pregnancy And Lactation Text: Based on animal studies, Olumiant may cause embryo-fetal harm when administered to pregnant women.  The medication should not be used in pregnancy.  Breastfeeding is not recommended during treatment. Topical Retinoid counseling:  Patient advised to apply a pea-sized amount only at bedtime and wait 30 minutes after washing their face before applying.  If too drying, patient may add a non-comedogenic moisturizer. The patient verbalized understanding of the proper use and possible adverse effects of retinoids.  All of the patient's questions and concerns were addressed. Enbrel Counseling:  I discussed with the patient the risks of etanercept including but not limited to myelosuppression, immunosuppression, autoimmune hepatitis, demyelinating diseases, lymphoma, and infections.  The patient understands that monitoring is required including a PPD at baseline and must alert us or the primary physician if symptoms of infection or other concerning signs are noted. Adbry Pregnancy And Lactation Text: It is unknown if this medication will adversely affect pregnancy or breast feeding. Benzoyl Peroxide Pregnancy And Lactation Text: This medication is Pregnancy Category C. It is unknown if benzoyl peroxide is excreted in breast milk. Dapsone Counseling: I discussed with the patient the risks of dapsone including but not limited to hemolytic anemia, agranulocytosis, rashes, methemoglobinemia, kidney failure, peripheral neuropathy, headaches, GI upset, and liver toxicity.  Patients who start dapsone require monitoring including baseline LFTs and weekly CBCs for the first month, then every month thereafter.  The patient verbalized understanding of the proper use and possible adverse effects of dapsone.  All of the patient's questions and concerns were addressed. Metronidazole Pregnancy And Lactation Text: This medication is Pregnancy Category B and considered safe during pregnancy.  It is also excreted in breast milk. Doxepin Pregnancy And Lactation Text: This medication is Pregnancy Category C and it isn't known if it is safe during pregnancy. It is also excreted in breast milk and breast feeding isn't recommended. Klisyri Counseling:  I discussed with the patient the risks of Klisyri including but not limited to erythema, scaling, itching, weeping, crusting, and pain. Tetracycline Counseling: Patient counseled regarding possible photosensitivity and increased risk for sunburn.  Patient instructed to avoid sunlight, if possible.  When exposed to sunlight, patients should wear protective clothing, sunglasses, and sunscreen.  The patient was instructed to call the office immediately if the following severe adverse effects occur:  hearing changes, easy bruising/bleeding, severe headache, or vision changes.  The patient verbalized understanding of the proper use and possible adverse effects of tetracycline.  All of the patient's questions and concerns were addressed. Patient understands to avoid pregnancy while on therapy due to potential birth defects. Nsaids Pregnancy And Lactation Text: These medications are considered safe up to 30 weeks gestation. It is excreted in breast milk. Opioid Counseling: I discussed with the patient the potential side effects of opioids including but not limited to addiction, altered mental status, and depression. I stressed avoiding alcohol, benzodiazepines, muscle relaxants and sleep aids unless specifically okayed by a physician. The patient verbalized understanding of the proper use and possible adverse effects of opioids. All of the patient's questions and concerns were addressed. They were instructed to flush the remaining pills down the toilet if they did not need them for pain. Methotrexate Pregnancy And Lactation Text: This medication is Pregnancy Category X and is known to cause fetal harm. This medication is excreted in breast milk. High Dose Vitamin A Counseling: Side effects reviewed, pt to contact office should one occur. Cephalexin Counseling: I counseled the patient regarding use of cephalexin as an antibiotic for prophylactic and/or therapeutic purposes. Cephalexin (commonly prescribed under brand name Keflex) is a cephalosporin antibiotic which is active against numerous classes of bacteria, including most skin bacteria. Side effects may include nausea, diarrhea, gastrointestinal upset, rash, hives, yeast infections, and in rare cases, hepatitis, kidney disease, seizures, fever, confusion, neurologic symptoms, and others. Patients with severe allergies to penicillin medications are cautioned that there is about a 10% incidence of cross-reactivity with cephalosporins. When possible, patients with penicillin allergies should use alternatives to cephalosporins for antibiotic therapy. Detail Level: Simple Glycopyrrolate Counseling:  I discussed with the patient the risks of glycopyrrolate including but not limited to skin rash, drowsiness, dry mouth, difficulty urinating, and blurred vision. Propranolol Counseling:  I discussed with the patient the risks of propranolol including but not limited to low heart rate, low blood pressure, low blood sugar, restlessness and increased cold sensitivity. They should call the office if they experience any of these side effects. Xolair Counseling:  Patient informed of potential adverse effects including but not limited to fever, muscle aches, rash and allergic reactions.  The patient verbalized understanding of the proper use and possible adverse effects of Xolair.  All of the patient's questions and concerns were addressed. Cibinqo Counseling: I discussed with the patient the risks of Cibinqo therapy including but not limited to common cold, nausea, headache, cold sores, increased blood CPK levels, dizziness, UTIs, fatigue, acne, and vomitting. Live vaccines should be avoided.  This medication has been linked to serious infections; higher rate of mortality; malignancy and lymphoproliferative disorders; major adverse cardiovascular events; thrombosis; thrombocytopenia and lymphopenia; lipid elevations; and retinal detachment. Rinvoq Counseling: I discussed with the patient the risks of Rinvoq therapy including but not limited to upper respiratory tract infections, shingles, cold sores, bronchitis, nausea, cough, fever, acne, and headache. Live vaccines should be avoided.  This medication has been linked to serious infections; higher rate of mortality; malignancy and lymphoproliferative disorders; major adverse cardiovascular events; thrombosis; thrombocytopenia, anemia, and neutropenia; lipid elevations; liver enzyme elevations; and gastrointestinal perforations. Carac Counseling:  I discussed with the patient the risks of Carac including but not limited to erythema, scaling, itching, weeping, crusting, and pain. Dapsone Pregnancy And Lactation Text: This medication is Pregnancy Category C and is not considered safe during pregnancy or breast feeding. Minocycline Counseling: Patient advised regarding possible photosensitivity and discoloration of the teeth, skin, lips, tongue and gums.  Patient instructed to avoid sunlight, if possible.  When exposed to sunlight, patients should wear protective clothing, sunglasses, and sunscreen.  The patient was instructed to call the office immediately if the following severe adverse effects occur:  hearing changes, easy bruising/bleeding, severe headache, or vision changes.  The patient verbalized understanding of the proper use and possible adverse effects of minocycline.  All of the patient's questions and concerns were addressed. Klisyri Pregnancy And Lactation Text: It is unknown if this medication can harm a developing fetus or if it is excreted in breast milk. Opioid Pregnancy And Lactation Text: These medications can lead to premature delivery and should be avoided during pregnancy. These medications are also present in breast milk in small amounts. Tranexamic Acid Counseling:  Patient advised of the small risk of bleeding problems with tranexamic acid. They were also instructed to call if they developed any nausea, vomiting or diarrhea. All of the patient's questions and concerns were addressed. Topical Sulfur Applications Counseling: Topical Sulfur Counseling: Patient counseled that this medication may cause skin irritation or allergic reactions.  In the event of skin irritation, the patient was advised to reduce the amount of the drug applied or use it less frequently.   The patient verbalized understanding of the proper use and possible adverse effects of topical sulfur application.  All of the patient's questions and concerns were addressed. Hydroxyzine Counseling: Patient advised that the medication is sedating and not to drive a car after taking this medication.  Patient informed of potential adverse effects including but not limited to dry mouth, urinary retention, and blurry vision.  The patient verbalized understanding of the proper use and possible adverse effects of hydroxyzine.  All of the patient's questions and concerns were addressed. Elidel Counseling: Patient may experience a mild burning sensation during topical application. Elidel is not approved in children less than 2 years of age. There have been case reports of hematologic and skin malignancies in patients using topical calcineurin inhibitors although causality is questionable. Cephalexin Pregnancy And Lactation Text: This medication is Pregnancy Category B and considered safe during pregnancy.  It is also excreted in breast milk but can be used safely for shorter doses. Protopic Counseling: Patient may experience a mild burning sensation during topical application. Protopic is not approved in children less than 2 years of age. There have been case reports of hematologic and skin malignancies in patients using topical calcineurin inhibitors although causality is questionable. Prednisone Counseling:  I discussed with the patient the risks of prolonged use of prednisone including but not limited to weight gain, insomnia, osteoporosis, mood changes, diabetes, susceptibility to infection, glaucoma and high blood pressure.  In cases where prednisone use is prolonged, patients should be monitored with blood pressure checks, serum glucose levels and an eye exam.  Additionally, the patient may need to be placed on GI prophylaxis, PCP prophylaxis, and calcium and vitamin D supplementation and/or a bisphosphonate.  The patient verbalized understanding of the proper use and the possible adverse effects of prednisone.  All of the patient's questions and concerns were addressed. Rinvoq Pregnancy And Lactation Text: Based on animal studies, Rinvoq may cause embryo-fetal harm when administered to pregnant women.  The medication should not be used in pregnancy.  Breastfeeding is not recommended during treatment and for 6 days after the last dose. High Dose Vitamin A Pregnancy And Lactation Text: High dose vitamin A therapy is contraindicated during pregnancy and breast feeding. Odomzo Counseling- I discussed with the patient the risks of Odomzo including but not limited to nausea, vomiting, diarrhea, constipation, weight loss, changes in the sense of taste, decreased appetite, muscle spasms, and hair loss.  The patient verbalized understanding of the proper use and possible adverse effects of Odomzo.  All of the patient's questions and concerns were addressed. Stelara Counseling:  I discussed with the patient the risks of ustekinumab including but not limited to immunosuppression, malignancy, posterior leukoencephalopathy syndrome, and serious infections.  The patient understands that monitoring is required including a PPD at baseline and must alert us or the primary physician if symptoms of infection or other concerning signs are noted. Glycopyrrolate Pregnancy And Lactation Text: This medication is Pregnancy Category B and is considered safe during pregnancy. It is unknown if it is excreted breast milk. Propranolol Pregnancy And Lactation Text: This medication is Pregnancy Category C and it isn't known if it is safe during pregnancy. It is excreted in breast milk. Xolair Pregnancy And Lactation Text: This medication is Pregnancy Category B and is considered safe during pregnancy. This medication is excreted in breast milk. Ketoconazole Counseling:   Patient counseled regarding improving absorption with orange juice.  Adverse effects include but are not limited to breast enlargement, headache, diarrhea, nausea, upset stomach, liver function test abnormalities, taste disturbance, and stomach pain.  There is a rare possibility of liver failure that can occur when taking ketoconazole. The patient understands that monitoring of LFTs may be required, especially at baseline. The patient verbalized understanding of the proper use and possible adverse effects of ketoconazole.  All of the patient's questions and concerns were addressed. Topical Sulfur Applications Pregnancy And Lactation Text: This medication is Pregnancy Category C and has an unknown safety profile during pregnancy. It is unknown if this topical medication is excreted in breast milk. Humira Counseling:  I discussed with the patient the risks of adalimumab including but not limited to myelosuppression, immunosuppression, autoimmune hepatitis, demyelinating diseases, lymphoma, and serious infections.  The patient understands that monitoring is required including a PPD at baseline and must alert us or the primary physician if symptoms of infection or other concerning signs are noted. Tazorac Counseling:  Patient advised that medication is irritating and drying.  Patient may need to apply sparingly and wash off after an hour before eventually leaving it on overnight.  The patient verbalized understanding of the proper use and possible adverse effects of tazorac.  All of the patient's questions and concerns were addressed. Dutasteride Counseling: Dustasteride Counseling:  I discussed with the patient the risks of use of dutasteride including but not limited to decreased libido, decreased ejaculate volume, and gynecomastia. Women who can become pregnant should not handle medication.  All of the patient's questions and concerns were addressed. Protopic Pregnancy And Lactation Text: This medication is Pregnancy Category C. It is unknown if this medication is excreted in breast milk when applied topically. Cibinqo Pregnancy And Lactation Text: It is unknown if this medication will adversely affect pregnancy or breast feeding.  You should not take this medication if you are currently pregnant or planning a pregnancy or while breastfeeding. Minoxidil Counseling: Minoxidil is a topical medication which can increase blood flow where it is applied. It is uncertain how this medication increases hair growth. Side effects are uncommon and include stinging and allergic reactions. Hydroxyzine Pregnancy And Lactation Text: This medication is not safe during pregnancy and should not be taken. It is also excreted in breast milk and breast feeding isn't recommended. Clindamycin Counseling: I counseled the patient regarding use of clindamycin as an antibiotic for prophylactic and/or therapeutic purposes. Clindamycin is active against numerous classes of bacteria, including skin bacteria. Side effects may include nausea, diarrhea, gastrointestinal upset, rash, hives, yeast infections, and in rare cases, colitis. Birth Control Pills Counseling: Birth Control Pill Counseling: I discussed with the patient the potential side effects of OCPs including but not limited to increased risk of stroke, heart attack, thrombophlebitis, deep venous thrombosis, hepatic adenomas, breast changes, GI upset, headaches, and depression.  The patient verbalized understanding of the proper use and possible adverse effects of OCPs. All of the patient's questions and concerns were addressed. Tranexamic Acid Pregnancy And Lactation Text: It is unknown if this medication is safe during pregnancy or breast feeding. Rituxan Counseling:  I discussed with the patient the risks of Rituxan infusions. Side effects can include infusion reactions, severe drug rashes including mucocutaneous reactions, reactivation of latent hepatitis and other infections and rarely progressive multifocal leukoencephalopathy.  All of the patient's questions and concerns were addressed. Ketoconazole Pregnancy And Lactation Text: This medication is Pregnancy Category C and it isn't know if it is safe during pregnancy. It is also excreted in breast milk and breast feeding isn't recommended. Hydroxychloroquine Counseling:  I discussed with the patient that a baseline ophthalmologic exam is needed at the start of therapy and every year thereafter while on therapy. A CBC may also be warranted for monitoring.  The side effects of this medication were discussed with the patient, including but not limited to agranulocytosis, aplastic anemia, seizures, rashes, retinopathy, and liver toxicity. Patient instructed to call the office should any adverse effect occur.  The patient verbalized understanding of the proper use and possible adverse effects of Plaquenil.  All the patient's questions and concerns were addressed. Fluconazole Counseling:  Patient counseled regarding adverse effects of fluconazole including but not limited to headache, diarrhea, nausea, upset stomach, liver function test abnormalities, taste disturbance, and stomach pain.  There is a rare possibility of liver failure that can occur when taking fluconazole.  The patient understands that monitoring of LFTs and kidney function test may be required, especially at baseline. The patient verbalized understanding of the proper use and possible adverse effects of fluconazole.  All of the patient's questions and concerns were addressed. Dutasteride Pregnancy And Lactation Text: This medication is absolutely contraindicated in women, especially during pregnancy and breast feeding. Feminization of male fetuses is possible if taking while pregnant. Quinolones Counseling:  I discussed with the patient the risks of fluoroquinolones including but not limited to GI upset, allergic reaction, drug rash, diarrhea, dizziness, photosensitivity, yeast infections, liver function test abnormalities, tendonitis/tendon rupture. Wartpeel Counseling:  I discussed with the patient the risks of Wartpeel including but not limited to erythema, scaling, itching, weeping, crusting, and pain. Azathioprine Counseling:  I discussed with the patient the risks of azathioprine including but not limited to myelosuppression, immunosuppression, hepatotoxicity, lymphoma, and infections.  The patient understands that monitoring is required including baseline LFTs, Creatinine, possible TPMP genotyping and weekly CBCs for the first month and then every 2 weeks thereafter.  The patient verbalized understanding of the proper use and possible adverse effects of azathioprine.  All of the patient's questions and concerns were addressed. Tazorac Pregnancy And Lactation Text: This medication is not safe during pregnancy. It is unknown if this medication is excreted in breast milk. Oral Minoxidil Counseling- I discussed with the patient the risks of oral minoxidil including but not limited to shortness of breath, swelling of the feet or ankles, dizziness, lightheadedness, unwanted hair growth and allergic reaction.  The patient verbalized understanding of the proper use and possible adverse effects of oral minoxidil.  All of the patient's questions and concerns were addressed. Taltz Counseling: I discussed with the patient the risks of ixekizumab including but not limited to immunosuppression, serious infections, worsening of inflammatory bowel disease and drug reactions.  The patient understands that monitoring is required including a PPD at baseline and must alert us or the primary physician if symptoms of infection or other concerning signs are noted. Cimzia Counseling:  I discussed with the patient the risks of Cimzia including but not limited to immunosuppression, allergic reactions and infections.  The patient understands that monitoring is required including a PPD at baseline and must alert us or the primary physician if symptoms of infection or other concerning signs are noted. Arava Counseling:  Patient counseled regarding adverse effects of Arava including but not limited to nausea, vomiting, abnormalities in liver function tests. Patients may develop mouth sores, rash, diarrhea, and abnormalities in blood counts. The patient understands that monitoring is required including LFTs and blood counts.  There is a rare possibility of scarring of the liver and lung problems that can occur when taking methotrexate. Persistent nausea, loss of appetite, pale stools, dark urine, cough, and shortness of breath should be reported immediately. Patient advised to discontinue Arava treatment and consult with a physician prior to attempting conception. The patient will have to undergo a treatment to eliminate Arava from the body prior to conception. Qbrexza Counseling:  I discussed with the patient the risks of Qbrexza including but not limited to headache, mydriasis, blurred vision, dry eyes, nasal dryness, dry mouth, dry throat, dry skin, urinary hesitation, and constipation.  Local skin reactions including erythema, burning, stinging, and itching can also occur. Valtrex Counseling: I discussed with the patient the risks of valacyclovir including but not limited to kidney damage, nausea, vomiting and severe allergy.  The patient understands that if the infection seems to be worsening or is not improving, they are to call. Clindamycin Pregnancy And Lactation Text: This medication can be used in pregnancy if certain situations. Clindamycin is also present in breast milk. Eucrisa Counseling: Patient may experience a mild burning sensation during topical application. Eucrisa is not approved in children less than 2 years of age. Birth Control Pills Pregnancy And Lactation Text: This medication should be avoided if pregnant and for the first 30 days post-partum. Calcipotriene Pregnancy And Lactation Text: This medication has not been proven safe during pregnancy. It is unknown if this medication is excreted in breast milk. Rituxan Pregnancy And Lactation Text: This medication is Pregnancy Category C and it isn't know if it is safe during pregnancy. It is unknown if this medication is excreted in breast milk but similar antibodies are known to be excreted. Albendazole Counseling:  I discussed with the patient the risks of albendazole including but not limited to cytopenia, kidney damage, nausea/vomiting and severe allergy.  The patient understands that this medication is being used in an off-label manner. Terbinafine Counseling: Patient counseling regarding adverse effects of terbinafine including but not limited to headache, diarrhea, rash, upset stomach, liver function test abnormalities, itching, taste/smell disturbance, nausea, abdominal pain, and flatulence.  There is a rare possibility of liver failure that can occur when taking terbinafine.  The patient understands that a baseline LFT and kidney function test may be required. The patient verbalized understanding of the proper use and possible adverse effects of terbinafine.  All of the patient's questions and concerns were addressed. Acitretin Counseling:  I discussed with the patient the risks of acitretin including but not limited to hair loss, dry lips/skin/eyes, liver damage, hyperlipidemia, depression/suicidal ideation, photosensitivity.  Serious rare side effects can include but are not limited to pancreatitis, pseudotumor cerebri, bony changes, clot formation/stroke/heart attack.  Patient understands that alcohol is contraindicated since it can result in liver toxicity and significantly prolong the elimination of the drug by many years. Hydroxychloroquine Pregnancy And Lactation Text: This medication has been shown to cause fetal harm but it isn't assigned a Pregnancy Risk Category. There are small amounts excreted in breast milk. Ilumya Counseling: I discussed with the patient the risks of tildrakizumab including but not limited to immunosuppression, malignancy, posterior leukoencephalopathy syndrome, and serious infections.  The patient understands that monitoring is required including a PPD at baseline and must alert us or the primary physician if symptoms of infection or other concerning signs are noted. Topical Clindamycin Counseling: Patient counseled that this medication may cause skin irritation or allergic reactions.  In the event of skin irritation, the patient was advised to reduce the amount of the drug applied or use it less frequently.   The patient verbalized understanding of the proper use and possible adverse effects of clindamycin.  All of the patient's questions and concerns were addressed. Erivedge Counseling- I discussed with the patient the risks of Erivedge including but not limited to nausea, vomiting, diarrhea, constipation, weight loss, changes in the sense of taste, decreased appetite, muscle spasms, and hair loss.  The patient verbalized understanding of the proper use and possible adverse effects of Erivedge.  All of the patient's questions and concerns were addressed.

## 2023-01-01 ENCOUNTER — INPATIENT (INPATIENT)
Facility: HOSPITAL | Age: 88
LOS: 2 days | DRG: 871 | End: 2023-10-19
Attending: INTERNAL MEDICINE | Admitting: INTERNAL MEDICINE
Payer: COMMERCIAL

## 2023-01-01 VITALS — HEART RATE: 71 BPM | OXYGEN SATURATION: 93 %

## 2023-01-01 VITALS — OXYGEN SATURATION: 99 % | HEART RATE: 77 BPM

## 2023-01-01 DIAGNOSIS — J18.9 PNEUMONIA, UNSPECIFIED ORGANISM: ICD-10-CM

## 2023-01-01 DIAGNOSIS — J44.1 CHRONIC OBSTRUCTIVE PULMONARY DISEASE WITH (ACUTE) EXACERBATION: ICD-10-CM

## 2023-01-01 DIAGNOSIS — D63.8 ANEMIA IN OTHER CHRONIC DISEASES CLASSIFIED ELSEWHERE: ICD-10-CM

## 2023-01-01 DIAGNOSIS — J90 PLEURAL EFFUSION, NOT ELSEWHERE CLASSIFIED: ICD-10-CM

## 2023-01-01 DIAGNOSIS — J81.1 CHRONIC PULMONARY EDEMA: ICD-10-CM

## 2023-01-01 DIAGNOSIS — I50.9 HEART FAILURE, UNSPECIFIED: ICD-10-CM

## 2023-01-01 DIAGNOSIS — J81.0 ACUTE PULMONARY EDEMA: ICD-10-CM

## 2023-01-01 DIAGNOSIS — R79.89 OTHER SPECIFIED ABNORMAL FINDINGS OF BLOOD CHEMISTRY: ICD-10-CM

## 2023-01-01 DIAGNOSIS — F32.9 MAJOR DEPRESSIVE DISORDER, SINGLE EPISODE, UNSPECIFIED: ICD-10-CM

## 2023-01-01 DIAGNOSIS — J96.01 ACUTE RESPIRATORY FAILURE WITH HYPOXIA: ICD-10-CM

## 2023-01-01 DIAGNOSIS — N17.9 ACUTE KIDNEY FAILURE, UNSPECIFIED: ICD-10-CM

## 2023-01-01 DIAGNOSIS — I95.9 HYPOTENSION, UNSPECIFIED: ICD-10-CM

## 2023-01-01 DIAGNOSIS — Z29.9 ENCOUNTER FOR PROPHYLACTIC MEASURES, UNSPECIFIED: ICD-10-CM

## 2023-01-01 DIAGNOSIS — E03.9 HYPOTHYROIDISM, UNSPECIFIED: ICD-10-CM

## 2023-01-01 LAB
ALBUMIN SERPL ELPH-MCNC: 2.5 G/DL — LOW (ref 3.3–5)
ALBUMIN SERPL ELPH-MCNC: 2.5 G/DL — LOW (ref 3.3–5)
ALBUMIN SERPL ELPH-MCNC: 2.6 G/DL — LOW (ref 3.3–5)
ALBUMIN SERPL ELPH-MCNC: 2.6 G/DL — LOW (ref 3.3–5)
ALBUMIN SERPL ELPH-MCNC: 3.4 G/DL — SIGNIFICANT CHANGE UP (ref 3.3–5)
ALBUMIN SERPL ELPH-MCNC: 3.4 G/DL — SIGNIFICANT CHANGE UP (ref 3.3–5)
ALP SERPL-CCNC: 111 U/L — SIGNIFICANT CHANGE UP (ref 40–120)
ALP SERPL-CCNC: 111 U/L — SIGNIFICANT CHANGE UP (ref 40–120)
ALP SERPL-CCNC: 65 U/L — SIGNIFICANT CHANGE UP (ref 40–120)
ALP SERPL-CCNC: 65 U/L — SIGNIFICANT CHANGE UP (ref 40–120)
ALP SERPL-CCNC: 81 U/L — SIGNIFICANT CHANGE UP (ref 40–120)
ALP SERPL-CCNC: 81 U/L — SIGNIFICANT CHANGE UP (ref 40–120)
ALT FLD-CCNC: 17 U/L — SIGNIFICANT CHANGE UP (ref 12–78)
ALT FLD-CCNC: 17 U/L — SIGNIFICANT CHANGE UP (ref 12–78)
ALT FLD-CCNC: 18 U/L — SIGNIFICANT CHANGE UP (ref 12–78)
ALT FLD-CCNC: 18 U/L — SIGNIFICANT CHANGE UP (ref 12–78)
ALT FLD-CCNC: 21 U/L — SIGNIFICANT CHANGE UP (ref 12–78)
ALT FLD-CCNC: 21 U/L — SIGNIFICANT CHANGE UP (ref 12–78)
ANION GAP SERPL CALC-SCNC: 4 MMOL/L — LOW (ref 5–17)
ANION GAP SERPL CALC-SCNC: 4 MMOL/L — LOW (ref 5–17)
ANION GAP SERPL CALC-SCNC: 6 MMOL/L — SIGNIFICANT CHANGE UP (ref 5–17)
ANION GAP SERPL CALC-SCNC: 6 MMOL/L — SIGNIFICANT CHANGE UP (ref 5–17)
ANION GAP SERPL CALC-SCNC: 8 MMOL/L — SIGNIFICANT CHANGE UP (ref 5–17)
ANION GAP SERPL CALC-SCNC: 8 MMOL/L — SIGNIFICANT CHANGE UP (ref 5–17)
APPEARANCE UR: CLEAR — SIGNIFICANT CHANGE UP
APPEARANCE UR: CLEAR — SIGNIFICANT CHANGE UP
APTT BLD: 32.3 SEC — SIGNIFICANT CHANGE UP (ref 24.5–35.6)
APTT BLD: 32.3 SEC — SIGNIFICANT CHANGE UP (ref 24.5–35.6)
AST SERPL-CCNC: 21 U/L — SIGNIFICANT CHANGE UP (ref 15–37)
AST SERPL-CCNC: 21 U/L — SIGNIFICANT CHANGE UP (ref 15–37)
AST SERPL-CCNC: 25 U/L — SIGNIFICANT CHANGE UP (ref 15–37)
AST SERPL-CCNC: 25 U/L — SIGNIFICANT CHANGE UP (ref 15–37)
AST SERPL-CCNC: 38 U/L — HIGH (ref 15–37)
AST SERPL-CCNC: 38 U/L — HIGH (ref 15–37)
BASE EXCESS BLDA CALC-SCNC: 3.3 MMOL/L — HIGH (ref -2–3)
BASE EXCESS BLDA CALC-SCNC: 3.3 MMOL/L — HIGH (ref -2–3)
BASE EXCESS BLDA CALC-SCNC: 3.6 MMOL/L — HIGH (ref -2–3)
BASE EXCESS BLDA CALC-SCNC: 3.6 MMOL/L — HIGH (ref -2–3)
BASE EXCESS BLDA CALC-SCNC: 4.1 MMOL/L — HIGH (ref -2–3)
BASE EXCESS BLDA CALC-SCNC: 4.1 MMOL/L — HIGH (ref -2–3)
BASE EXCESS BLDA CALC-SCNC: 4.8 MMOL/L — HIGH (ref -2–3)
BASE EXCESS BLDA CALC-SCNC: 4.8 MMOL/L — HIGH (ref -2–3)
BASE EXCESS BLDA CALC-SCNC: 5.5 MMOL/L — HIGH (ref -2–3)
BASE EXCESS BLDA CALC-SCNC: 5.5 MMOL/L — HIGH (ref -2–3)
BASOPHILS # BLD AUTO: 0 K/UL — SIGNIFICANT CHANGE UP (ref 0–0.2)
BASOPHILS # BLD AUTO: 0 K/UL — SIGNIFICANT CHANGE UP (ref 0–0.2)
BASOPHILS # BLD AUTO: 0.05 K/UL — SIGNIFICANT CHANGE UP (ref 0–0.2)
BASOPHILS # BLD AUTO: 0.05 K/UL — SIGNIFICANT CHANGE UP (ref 0–0.2)
BASOPHILS NFR BLD AUTO: 0 % — SIGNIFICANT CHANGE UP (ref 0–2)
BASOPHILS NFR BLD AUTO: 0 % — SIGNIFICANT CHANGE UP (ref 0–2)
BASOPHILS NFR BLD AUTO: 0.4 % — SIGNIFICANT CHANGE UP (ref 0–2)
BASOPHILS NFR BLD AUTO: 0.4 % — SIGNIFICANT CHANGE UP (ref 0–2)
BILIRUB SERPL-MCNC: 0.3 MG/DL — SIGNIFICANT CHANGE UP (ref 0.2–1.2)
BILIRUB SERPL-MCNC: 0.3 MG/DL — SIGNIFICANT CHANGE UP (ref 0.2–1.2)
BILIRUB SERPL-MCNC: 0.4 MG/DL — SIGNIFICANT CHANGE UP (ref 0.2–1.2)
BILIRUB SERPL-MCNC: 0.4 MG/DL — SIGNIFICANT CHANGE UP (ref 0.2–1.2)
BILIRUB SERPL-MCNC: 0.5 MG/DL — SIGNIFICANT CHANGE UP (ref 0.2–1.2)
BILIRUB SERPL-MCNC: 0.5 MG/DL — SIGNIFICANT CHANGE UP (ref 0.2–1.2)
BILIRUB UR-MCNC: NEGATIVE — SIGNIFICANT CHANGE UP
BILIRUB UR-MCNC: NEGATIVE — SIGNIFICANT CHANGE UP
BLOOD GAS COMMENTS ARTERIAL: SIGNIFICANT CHANGE UP
BUN SERPL-MCNC: 17 MG/DL — SIGNIFICANT CHANGE UP (ref 7–23)
BUN SERPL-MCNC: 17 MG/DL — SIGNIFICANT CHANGE UP (ref 7–23)
BUN SERPL-MCNC: 22 MG/DL — SIGNIFICANT CHANGE UP (ref 7–23)
BUN SERPL-MCNC: 22 MG/DL — SIGNIFICANT CHANGE UP (ref 7–23)
BUN SERPL-MCNC: 42 MG/DL — HIGH (ref 7–23)
BUN SERPL-MCNC: 42 MG/DL — HIGH (ref 7–23)
CALCIUM SERPL-MCNC: 8.1 MG/DL — LOW (ref 8.5–10.1)
CALCIUM SERPL-MCNC: 8.1 MG/DL — LOW (ref 8.5–10.1)
CALCIUM SERPL-MCNC: 8.2 MG/DL — LOW (ref 8.5–10.1)
CALCIUM SERPL-MCNC: 8.2 MG/DL — LOW (ref 8.5–10.1)
CALCIUM SERPL-MCNC: 9 MG/DL — SIGNIFICANT CHANGE UP (ref 8.5–10.1)
CALCIUM SERPL-MCNC: 9 MG/DL — SIGNIFICANT CHANGE UP (ref 8.5–10.1)
CHLORIDE SERPL-SCNC: 100 MMOL/L — SIGNIFICANT CHANGE UP (ref 96–108)
CHLORIDE SERPL-SCNC: 102 MMOL/L — SIGNIFICANT CHANGE UP (ref 96–108)
CHLORIDE SERPL-SCNC: 102 MMOL/L — SIGNIFICANT CHANGE UP (ref 96–108)
CHOLEST SERPL-MCNC: 136 MG/DL — SIGNIFICANT CHANGE UP
CHOLEST SERPL-MCNC: 136 MG/DL — SIGNIFICANT CHANGE UP
CO2 SERPL-SCNC: 30 MMOL/L — SIGNIFICANT CHANGE UP (ref 22–31)
CO2 SERPL-SCNC: 33 MMOL/L — HIGH (ref 22–31)
CO2 SERPL-SCNC: 33 MMOL/L — HIGH (ref 22–31)
COLOR SPEC: YELLOW — SIGNIFICANT CHANGE UP
COLOR SPEC: YELLOW — SIGNIFICANT CHANGE UP
CORTIS AM PEAK SERPL-MCNC: 16.3 UG/DL — SIGNIFICANT CHANGE UP (ref 6–18.4)
CORTIS AM PEAK SERPL-MCNC: 16.3 UG/DL — SIGNIFICANT CHANGE UP (ref 6–18.4)
CREAT SERPL-MCNC: 0.95 MG/DL — SIGNIFICANT CHANGE UP (ref 0.5–1.3)
CREAT SERPL-MCNC: 0.95 MG/DL — SIGNIFICANT CHANGE UP (ref 0.5–1.3)
CREAT SERPL-MCNC: 0.98 MG/DL — SIGNIFICANT CHANGE UP (ref 0.5–1.3)
CREAT SERPL-MCNC: 0.98 MG/DL — SIGNIFICANT CHANGE UP (ref 0.5–1.3)
CREAT SERPL-MCNC: 1.2 MG/DL — SIGNIFICANT CHANGE UP (ref 0.5–1.3)
CREAT SERPL-MCNC: 1.2 MG/DL — SIGNIFICANT CHANGE UP (ref 0.5–1.3)
CRP SERPL-MCNC: 9 MG/L — HIGH
CRP SERPL-MCNC: 9 MG/L — HIGH
CULTURE RESULTS: NO GROWTH — SIGNIFICANT CHANGE UP
CULTURE RESULTS: NO GROWTH — SIGNIFICANT CHANGE UP
DIFF PNL FLD: NEGATIVE — SIGNIFICANT CHANGE UP
DIFF PNL FLD: NEGATIVE — SIGNIFICANT CHANGE UP
EGFR: 42 ML/MIN/1.73M2 — LOW
EGFR: 42 ML/MIN/1.73M2 — LOW
EGFR: 53 ML/MIN/1.73M2 — LOW
EGFR: 53 ML/MIN/1.73M2 — LOW
EGFR: 55 ML/MIN/1.73M2 — LOW
EGFR: 55 ML/MIN/1.73M2 — LOW
EOSINOPHIL # BLD AUTO: 0 K/UL — SIGNIFICANT CHANGE UP (ref 0–0.5)
EOSINOPHIL # BLD AUTO: 0 K/UL — SIGNIFICANT CHANGE UP (ref 0–0.5)
EOSINOPHIL # BLD AUTO: 0.04 K/UL — SIGNIFICANT CHANGE UP (ref 0–0.5)
EOSINOPHIL # BLD AUTO: 0.04 K/UL — SIGNIFICANT CHANGE UP (ref 0–0.5)
EOSINOPHIL NFR BLD AUTO: 0 % — SIGNIFICANT CHANGE UP (ref 0–6)
EOSINOPHIL NFR BLD AUTO: 0 % — SIGNIFICANT CHANGE UP (ref 0–6)
EOSINOPHIL NFR BLD AUTO: 0.4 % — SIGNIFICANT CHANGE UP (ref 0–6)
EOSINOPHIL NFR BLD AUTO: 0.4 % — SIGNIFICANT CHANGE UP (ref 0–6)
ERYTHROCYTE [SEDIMENTATION RATE] IN BLOOD: 38 MM/HR — HIGH (ref 0–20)
ERYTHROCYTE [SEDIMENTATION RATE] IN BLOOD: 38 MM/HR — HIGH (ref 0–20)
FLUAV AG NPH QL: SIGNIFICANT CHANGE UP
FLUAV AG NPH QL: SIGNIFICANT CHANGE UP
FLUBV AG NPH QL: SIGNIFICANT CHANGE UP
FLUBV AG NPH QL: SIGNIFICANT CHANGE UP
GAS PNL BLDA: SIGNIFICANT CHANGE UP
GLUCOSE SERPL-MCNC: 123 MG/DL — HIGH (ref 70–99)
GLUCOSE SERPL-MCNC: 123 MG/DL — HIGH (ref 70–99)
GLUCOSE SERPL-MCNC: 177 MG/DL — HIGH (ref 70–99)
GLUCOSE SERPL-MCNC: 177 MG/DL — HIGH (ref 70–99)
GLUCOSE SERPL-MCNC: 219 MG/DL — HIGH (ref 70–99)
GLUCOSE SERPL-MCNC: 219 MG/DL — HIGH (ref 70–99)
GLUCOSE UR QL: NEGATIVE MG/DL — SIGNIFICANT CHANGE UP
GLUCOSE UR QL: NEGATIVE MG/DL — SIGNIFICANT CHANGE UP
HCO3 BLDA-SCNC: 29 MMOL/L — HIGH (ref 21–28)
HCO3 BLDA-SCNC: 29 MMOL/L — HIGH (ref 21–28)
HCO3 BLDA-SCNC: 30 MMOL/L — HIGH (ref 21–28)
HCO3 BLDA-SCNC: 31 MMOL/L — HIGH (ref 21–28)
HCT VFR BLD CALC: 29 % — LOW (ref 34.5–45)
HCT VFR BLD CALC: 29 % — LOW (ref 34.5–45)
HCT VFR BLD CALC: 33.1 % — LOW (ref 34.5–45)
HCT VFR BLD CALC: 42.1 % — SIGNIFICANT CHANGE UP (ref 39–50)
HCT VFR BLD CALC: 42.1 % — SIGNIFICANT CHANGE UP (ref 39–50)
HDLC SERPL-MCNC: 45 MG/DL — LOW
HDLC SERPL-MCNC: 45 MG/DL — LOW
HGB BLD-MCNC: 10.1 G/DL — LOW (ref 11.5–15.5)
HGB BLD-MCNC: 10.1 G/DL — LOW (ref 11.5–15.5)
HGB BLD-MCNC: 10.3 G/DL — LOW (ref 11.5–15.5)
HGB BLD-MCNC: 10.3 G/DL — LOW (ref 11.5–15.5)
HGB BLD-MCNC: 12.8 G/DL — LOW (ref 13–17)
HGB BLD-MCNC: 12.8 G/DL — LOW (ref 13–17)
HGB BLD-MCNC: 8.8 G/DL — LOW (ref 11.5–15.5)
HGB BLD-MCNC: 8.8 G/DL — LOW (ref 11.5–15.5)
HOROWITZ INDEX BLDA+IHG-RTO: 100 — SIGNIFICANT CHANGE UP
HOROWITZ INDEX BLDA+IHG-RTO: 50 — SIGNIFICANT CHANGE UP
HOROWITZ INDEX BLDA+IHG-RTO: 50 — SIGNIFICANT CHANGE UP
HOROWITZ INDEX BLDA+IHG-RTO: 70 — SIGNIFICANT CHANGE UP
HOROWITZ INDEX BLDA+IHG-RTO: 70 — SIGNIFICANT CHANGE UP
IMM GRANULOCYTES NFR BLD AUTO: 0.4 % — SIGNIFICANT CHANGE UP (ref 0–0.9)
IMM GRANULOCYTES NFR BLD AUTO: 0.4 % — SIGNIFICANT CHANGE UP (ref 0–0.9)
INR BLD: 0.94 RATIO — SIGNIFICANT CHANGE UP (ref 0.85–1.18)
INR BLD: 0.94 RATIO — SIGNIFICANT CHANGE UP (ref 0.85–1.18)
INR BLD: 1.12 RATIO — SIGNIFICANT CHANGE UP (ref 0.85–1.18)
INR BLD: 1.12 RATIO — SIGNIFICANT CHANGE UP (ref 0.85–1.18)
INR BLD: 1.39 RATIO — HIGH (ref 0.85–1.18)
INR BLD: 1.39 RATIO — HIGH (ref 0.85–1.18)
KETONES UR-MCNC: NEGATIVE MG/DL — SIGNIFICANT CHANGE UP
KETONES UR-MCNC: NEGATIVE MG/DL — SIGNIFICANT CHANGE UP
LACTATE SERPL-SCNC: 1 MMOL/L — SIGNIFICANT CHANGE UP (ref 0.7–2)
LACTATE SERPL-SCNC: 1 MMOL/L — SIGNIFICANT CHANGE UP (ref 0.7–2)
LACTATE SERPL-SCNC: 1.6 MMOL/L — SIGNIFICANT CHANGE UP (ref 0.7–2)
LACTATE SERPL-SCNC: 1.6 MMOL/L — SIGNIFICANT CHANGE UP (ref 0.7–2)
LACTATE SERPL-SCNC: 1.7 MMOL/L — SIGNIFICANT CHANGE UP (ref 0.7–2)
LACTATE SERPL-SCNC: 1.7 MMOL/L — SIGNIFICANT CHANGE UP (ref 0.7–2)
LDH SERPL L TO P-CCNC: 261 U/L — HIGH (ref 50–242)
LDH SERPL L TO P-CCNC: 261 U/L — HIGH (ref 50–242)
LEGIONELLA AG UR QL: NEGATIVE — SIGNIFICANT CHANGE UP
LEGIONELLA AG UR QL: NEGATIVE — SIGNIFICANT CHANGE UP
LEUKOCYTE ESTERASE UR-ACNC: ABNORMAL
LEUKOCYTE ESTERASE UR-ACNC: ABNORMAL
LIDOCAIN IGE QN: 50 U/L — SIGNIFICANT CHANGE UP (ref 13–75)
LIDOCAIN IGE QN: 50 U/L — SIGNIFICANT CHANGE UP (ref 13–75)
LIPID PNL WITH DIRECT LDL SERPL: 67 MG/DL — SIGNIFICANT CHANGE UP
LIPID PNL WITH DIRECT LDL SERPL: 67 MG/DL — SIGNIFICANT CHANGE UP
LYMPHOCYTES # BLD AUTO: 0.22 K/UL — LOW (ref 1–3.3)
LYMPHOCYTES # BLD AUTO: 0.22 K/UL — LOW (ref 1–3.3)
LYMPHOCYTES # BLD AUTO: 18.3 % — SIGNIFICANT CHANGE UP (ref 13–44)
LYMPHOCYTES # BLD AUTO: 18.3 % — SIGNIFICANT CHANGE UP (ref 13–44)
LYMPHOCYTES # BLD AUTO: 2 % — LOW (ref 13–44)
LYMPHOCYTES # BLD AUTO: 2 % — LOW (ref 13–44)
LYMPHOCYTES # BLD AUTO: 2.04 K/UL — SIGNIFICANT CHANGE UP (ref 1–3.3)
LYMPHOCYTES # BLD AUTO: 2.04 K/UL — SIGNIFICANT CHANGE UP (ref 1–3.3)
MAGNESIUM SERPL-MCNC: 2.3 MG/DL — SIGNIFICANT CHANGE UP (ref 1.6–2.6)
MAGNESIUM SERPL-MCNC: 2.3 MG/DL — SIGNIFICANT CHANGE UP (ref 1.6–2.6)
MAGNESIUM SERPL-MCNC: 2.4 MG/DL — SIGNIFICANT CHANGE UP (ref 1.6–2.6)
MAGNESIUM SERPL-MCNC: 2.4 MG/DL — SIGNIFICANT CHANGE UP (ref 1.6–2.6)
MCHC RBC-ENTMCNC: 28.6 PG — SIGNIFICANT CHANGE UP (ref 27–34)
MCHC RBC-ENTMCNC: 28.6 PG — SIGNIFICANT CHANGE UP (ref 27–34)
MCHC RBC-ENTMCNC: 28.7 PG — SIGNIFICANT CHANGE UP (ref 27–34)
MCHC RBC-ENTMCNC: 28.7 PG — SIGNIFICANT CHANGE UP (ref 27–34)
MCHC RBC-ENTMCNC: 28.9 PG — SIGNIFICANT CHANGE UP (ref 27–34)
MCHC RBC-ENTMCNC: 28.9 PG — SIGNIFICANT CHANGE UP (ref 27–34)
MCHC RBC-ENTMCNC: 29 PG — SIGNIFICANT CHANGE UP (ref 27–34)
MCHC RBC-ENTMCNC: 29 PG — SIGNIFICANT CHANGE UP (ref 27–34)
MCHC RBC-ENTMCNC: 30.3 GM/DL — LOW (ref 32–36)
MCHC RBC-ENTMCNC: 30.3 GM/DL — LOW (ref 32–36)
MCHC RBC-ENTMCNC: 30.4 GM/DL — LOW (ref 32–36)
MCHC RBC-ENTMCNC: 30.4 GM/DL — LOW (ref 32–36)
MCHC RBC-ENTMCNC: 30.5 GM/DL — LOW (ref 32–36)
MCHC RBC-ENTMCNC: 30.5 GM/DL — LOW (ref 32–36)
MCHC RBC-ENTMCNC: 31.1 GM/DL — LOW (ref 32–36)
MCHC RBC-ENTMCNC: 31.1 GM/DL — LOW (ref 32–36)
MCV RBC AUTO: 93.2 FL — SIGNIFICANT CHANGE UP (ref 80–100)
MCV RBC AUTO: 93.2 FL — SIGNIFICANT CHANGE UP (ref 80–100)
MCV RBC AUTO: 94 FL — SIGNIFICANT CHANGE UP (ref 80–100)
MCV RBC AUTO: 94 FL — SIGNIFICANT CHANGE UP (ref 80–100)
MCV RBC AUTO: 94.5 FL — SIGNIFICANT CHANGE UP (ref 80–100)
MCV RBC AUTO: 94.5 FL — SIGNIFICANT CHANGE UP (ref 80–100)
MCV RBC AUTO: 94.6 FL — SIGNIFICANT CHANGE UP (ref 80–100)
MCV RBC AUTO: 94.6 FL — SIGNIFICANT CHANGE UP (ref 80–100)
MONOCYTES # BLD AUTO: 0.54 K/UL — SIGNIFICANT CHANGE UP (ref 0–0.9)
MONOCYTES # BLD AUTO: 0.54 K/UL — SIGNIFICANT CHANGE UP (ref 0–0.9)
MONOCYTES # BLD AUTO: 0.76 K/UL — SIGNIFICANT CHANGE UP (ref 0–0.9)
MONOCYTES # BLD AUTO: 0.76 K/UL — SIGNIFICANT CHANGE UP (ref 0–0.9)
MONOCYTES NFR BLD AUTO: 4.8 % — SIGNIFICANT CHANGE UP (ref 2–14)
MONOCYTES NFR BLD AUTO: 4.8 % — SIGNIFICANT CHANGE UP (ref 2–14)
MONOCYTES NFR BLD AUTO: 7 % — SIGNIFICANT CHANGE UP (ref 2–14)
MONOCYTES NFR BLD AUTO: 7 % — SIGNIFICANT CHANGE UP (ref 2–14)
MRSA PCR RESULT.: DETECTED
MRSA PCR RESULT.: DETECTED
NEUTROPHILS # BLD AUTO: 8.44 K/UL — HIGH (ref 1.8–7.4)
NEUTROPHILS # BLD AUTO: 8.44 K/UL — HIGH (ref 1.8–7.4)
NEUTROPHILS # BLD AUTO: 9.85 K/UL — HIGH (ref 1.8–7.4)
NEUTROPHILS # BLD AUTO: 9.85 K/UL — HIGH (ref 1.8–7.4)
NEUTROPHILS NFR BLD AUTO: 75.7 % — SIGNIFICANT CHANGE UP (ref 43–77)
NEUTROPHILS NFR BLD AUTO: 75.7 % — SIGNIFICANT CHANGE UP (ref 43–77)
NEUTROPHILS NFR BLD AUTO: 89 % — HIGH (ref 43–77)
NEUTROPHILS NFR BLD AUTO: 89 % — HIGH (ref 43–77)
NITRITE UR-MCNC: NEGATIVE — SIGNIFICANT CHANGE UP
NITRITE UR-MCNC: NEGATIVE — SIGNIFICANT CHANGE UP
NON HDL CHOLESTEROL: 91 MG/DL — SIGNIFICANT CHANGE UP
NON HDL CHOLESTEROL: 91 MG/DL — SIGNIFICANT CHANGE UP
NRBC # BLD: 0 /100 WBCS — SIGNIFICANT CHANGE UP (ref 0–0)
NRBC # BLD: SIGNIFICANT CHANGE UP /100 WBCS (ref 0–0)
NRBC # BLD: SIGNIFICANT CHANGE UP /100 WBCS (ref 0–0)
NT-PROBNP SERPL-SCNC: 1328 PG/ML — HIGH (ref 0–450)
NT-PROBNP SERPL-SCNC: 1328 PG/ML — HIGH (ref 0–450)
NT-PROBNP SERPL-SCNC: 1846 PG/ML — HIGH (ref 0–450)
NT-PROBNP SERPL-SCNC: 1846 PG/ML — HIGH (ref 0–450)
NT-PROBNP SERPL-SCNC: HIGH PG/ML (ref 0–450)
NT-PROBNP SERPL-SCNC: HIGH PG/ML (ref 0–450)
PCO2 BLDA: 54 MMHG — HIGH (ref 32–35)
PCO2 BLDA: 54 MMHG — HIGH (ref 32–35)
PCO2 BLDA: 56 MMHG — HIGH (ref 32–35)
PCO2 BLDA: 56 MMHG — HIGH (ref 32–35)
PCO2 BLDA: 58 MMHG — HIGH (ref 32–35)
PCO2 BLDA: 58 MMHG — HIGH (ref 32–35)
PCO2 BLDA: 60 MMHG — HIGH (ref 35–48)
PCO2 BLDA: 60 MMHG — HIGH (ref 35–48)
PCO2 BLDA: 62 MMHG — HIGH (ref 32–35)
PCO2 BLDA: 62 MMHG — HIGH (ref 32–35)
PH BLDA: 7.3 — LOW (ref 7.35–7.45)
PH BLDA: 7.3 — LOW (ref 7.35–7.45)
PH BLDA: 7.32 — LOW (ref 7.35–7.45)
PH BLDA: 7.32 — LOW (ref 7.35–7.45)
PH BLDA: 7.33 — LOW (ref 7.35–7.45)
PH BLDA: 7.33 — LOW (ref 7.35–7.45)
PH BLDA: 7.34 — LOW (ref 7.35–7.45)
PH UR: 5.5 — SIGNIFICANT CHANGE UP (ref 5–8)
PH UR: 5.5 — SIGNIFICANT CHANGE UP (ref 5–8)
PHOSPHATE SERPL-MCNC: 3.8 MG/DL — SIGNIFICANT CHANGE UP (ref 2.5–4.5)
PHOSPHATE SERPL-MCNC: 3.8 MG/DL — SIGNIFICANT CHANGE UP (ref 2.5–4.5)
PHOSPHATE SERPL-MCNC: 4.9 MG/DL — HIGH (ref 2.5–4.5)
PHOSPHATE SERPL-MCNC: 4.9 MG/DL — HIGH (ref 2.5–4.5)
PLATELET # BLD AUTO: 186 K/UL — SIGNIFICANT CHANGE UP (ref 150–400)
PLATELET # BLD AUTO: 186 K/UL — SIGNIFICANT CHANGE UP (ref 150–400)
PLATELET # BLD AUTO: 210 K/UL — SIGNIFICANT CHANGE UP (ref 150–400)
PLATELET # BLD AUTO: 210 K/UL — SIGNIFICANT CHANGE UP (ref 150–400)
PLATELET # BLD AUTO: 221 K/UL — SIGNIFICANT CHANGE UP (ref 150–400)
PLATELET # BLD AUTO: 221 K/UL — SIGNIFICANT CHANGE UP (ref 150–400)
PLATELET # BLD AUTO: 298 K/UL — SIGNIFICANT CHANGE UP (ref 150–400)
PLATELET # BLD AUTO: 298 K/UL — SIGNIFICANT CHANGE UP (ref 150–400)
PO2 BLDA: 121 MMHG — HIGH (ref 83–108)
PO2 BLDA: 121 MMHG — HIGH (ref 83–108)
PO2 BLDA: 124 MMHG — HIGH (ref 83–108)
PO2 BLDA: 124 MMHG — HIGH (ref 83–108)
PO2 BLDA: 232 MMHG — HIGH (ref 83–108)
PO2 BLDA: 232 MMHG — HIGH (ref 83–108)
PO2 BLDA: 64 MMHG — LOW (ref 83–108)
PO2 BLDA: 64 MMHG — LOW (ref 83–108)
PO2 BLDA: 83 MMHG — SIGNIFICANT CHANGE UP (ref 83–108)
PO2 BLDA: 83 MMHG — SIGNIFICANT CHANGE UP (ref 83–108)
POTASSIUM SERPL-MCNC: 3.7 MMOL/L — SIGNIFICANT CHANGE UP (ref 3.5–5.3)
POTASSIUM SERPL-MCNC: 3.7 MMOL/L — SIGNIFICANT CHANGE UP (ref 3.5–5.3)
POTASSIUM SERPL-MCNC: 4.3 MMOL/L — SIGNIFICANT CHANGE UP (ref 3.5–5.3)
POTASSIUM SERPL-SCNC: 3.7 MMOL/L — SIGNIFICANT CHANGE UP (ref 3.5–5.3)
POTASSIUM SERPL-SCNC: 3.7 MMOL/L — SIGNIFICANT CHANGE UP (ref 3.5–5.3)
POTASSIUM SERPL-SCNC: 4.3 MMOL/L — SIGNIFICANT CHANGE UP (ref 3.5–5.3)
PROCALCITONIN SERPL-MCNC: 0.51 NG/ML — HIGH
PROCALCITONIN SERPL-MCNC: 0.51 NG/ML — HIGH
PROCALCITONIN SERPL-MCNC: 3.97 NG/ML — HIGH
PROCALCITONIN SERPL-MCNC: 3.97 NG/ML — HIGH
PROT SERPL-MCNC: 6.6 G/DL — SIGNIFICANT CHANGE UP (ref 6–8.3)
PROT SERPL-MCNC: 8.2 G/DL — SIGNIFICANT CHANGE UP (ref 6–8.3)
PROT SERPL-MCNC: 8.2 G/DL — SIGNIFICANT CHANGE UP (ref 6–8.3)
PROT UR-MCNC: SIGNIFICANT CHANGE UP MG/DL
PROT UR-MCNC: SIGNIFICANT CHANGE UP MG/DL
PROTHROM AB SERPL-ACNC: 11 SEC — SIGNIFICANT CHANGE UP (ref 9.5–13)
PROTHROM AB SERPL-ACNC: 11 SEC — SIGNIFICANT CHANGE UP (ref 9.5–13)
PROTHROM AB SERPL-ACNC: 13.1 SEC — HIGH (ref 9.5–13)
PROTHROM AB SERPL-ACNC: 13.1 SEC — HIGH (ref 9.5–13)
PROTHROM AB SERPL-ACNC: 16.1 SEC — HIGH (ref 9.5–13)
PROTHROM AB SERPL-ACNC: 16.1 SEC — HIGH (ref 9.5–13)
RAPID RVP RESULT: SIGNIFICANT CHANGE UP
RAPID RVP RESULT: SIGNIFICANT CHANGE UP
RBC # BLD: 3.07 M/UL — LOW (ref 3.8–5.2)
RBC # BLD: 3.07 M/UL — LOW (ref 3.8–5.2)
RBC # BLD: 3.5 M/UL — LOW (ref 3.8–5.2)
RBC # BLD: 3.5 M/UL — LOW (ref 3.8–5.2)
RBC # BLD: 3.55 M/UL — LOW (ref 3.8–5.2)
RBC # BLD: 3.55 M/UL — LOW (ref 3.8–5.2)
RBC # BLD: 4.48 M/UL — SIGNIFICANT CHANGE UP (ref 4.2–5.8)
RBC # BLD: 4.48 M/UL — SIGNIFICANT CHANGE UP (ref 4.2–5.8)
RBC # FLD: 13.2 % — SIGNIFICANT CHANGE UP (ref 10.3–14.5)
RBC # FLD: 13.3 % — SIGNIFICANT CHANGE UP (ref 10.3–14.5)
RBC # FLD: 13.3 % — SIGNIFICANT CHANGE UP (ref 10.3–14.5)
RBC # FLD: 13.4 % — SIGNIFICANT CHANGE UP (ref 10.3–14.5)
RBC # FLD: 13.4 % — SIGNIFICANT CHANGE UP (ref 10.3–14.5)
RSV RNA NPH QL NAA+NON-PROBE: SIGNIFICANT CHANGE UP
RSV RNA NPH QL NAA+NON-PROBE: SIGNIFICANT CHANGE UP
S AUREUS DNA NOSE QL NAA+PROBE: DETECTED
S AUREUS DNA NOSE QL NAA+PROBE: DETECTED
S PNEUM AG UR QL: NEGATIVE — SIGNIFICANT CHANGE UP
S PNEUM AG UR QL: NEGATIVE — SIGNIFICANT CHANGE UP
SAO2 % BLDA: 100 % — HIGH (ref 94–98)
SAO2 % BLDA: 100 % — HIGH (ref 94–98)
SAO2 % BLDA: 92.7 % — LOW (ref 94–98)
SAO2 % BLDA: 92.7 % — LOW (ref 94–98)
SAO2 % BLDA: 97.1 % — SIGNIFICANT CHANGE UP (ref 94–98)
SAO2 % BLDA: 97.1 % — SIGNIFICANT CHANGE UP (ref 94–98)
SAO2 % BLDA: 99.1 % — HIGH (ref 94–98)
SAO2 % BLDA: 99.1 % — HIGH (ref 94–98)
SAO2 % BLDA: 99.8 % — HIGH (ref 94–98)
SAO2 % BLDA: 99.8 % — HIGH (ref 94–98)
SARS-COV-2 RNA SPEC QL NAA+PROBE: SIGNIFICANT CHANGE UP
SODIUM SERPL-SCNC: 137 MMOL/L — SIGNIFICANT CHANGE UP (ref 135–145)
SODIUM SERPL-SCNC: 137 MMOL/L — SIGNIFICANT CHANGE UP (ref 135–145)
SODIUM SERPL-SCNC: 138 MMOL/L — SIGNIFICANT CHANGE UP (ref 135–145)
SP GR SPEC: 1.03 — SIGNIFICANT CHANGE UP (ref 1–1.03)
SP GR SPEC: 1.03 — SIGNIFICANT CHANGE UP (ref 1–1.03)
SPECIMEN SOURCE: SIGNIFICANT CHANGE UP
SPECIMEN SOURCE: SIGNIFICANT CHANGE UP
TRIGL SERPL-MCNC: 133 MG/DL — SIGNIFICANT CHANGE UP
TRIGL SERPL-MCNC: 133 MG/DL — SIGNIFICANT CHANGE UP
TROPONIN I, HIGH SENSITIVITY RESULT: 18.7 NG/L — SIGNIFICANT CHANGE UP
TROPONIN I, HIGH SENSITIVITY RESULT: 18.7 NG/L — SIGNIFICANT CHANGE UP
TROPONIN I, HIGH SENSITIVITY RESULT: 21.1 NG/L — SIGNIFICANT CHANGE UP
TROPONIN I, HIGH SENSITIVITY RESULT: 21.1 NG/L — SIGNIFICANT CHANGE UP
TROPONIN I, HIGH SENSITIVITY RESULT: 21.8 NG/L — SIGNIFICANT CHANGE UP
TROPONIN I, HIGH SENSITIVITY RESULT: 21.8 NG/L — SIGNIFICANT CHANGE UP
TROPONIN I, HIGH SENSITIVITY RESULT: 221 NG/L — HIGH
TROPONIN I, HIGH SENSITIVITY RESULT: 221 NG/L — HIGH
TROPONIN I, HIGH SENSITIVITY RESULT: 391.1 NG/L — HIGH
TROPONIN I, HIGH SENSITIVITY RESULT: 391.1 NG/L — HIGH
TSH SERPL-MCNC: 0.89 UIU/ML — SIGNIFICANT CHANGE UP (ref 0.36–3.74)
TSH SERPL-MCNC: 0.89 UIU/ML — SIGNIFICANT CHANGE UP (ref 0.36–3.74)
TSH SERPL-MCNC: 3.15 UIU/ML — SIGNIFICANT CHANGE UP (ref 0.36–3.74)
TSH SERPL-MCNC: 3.15 UIU/ML — SIGNIFICANT CHANGE UP (ref 0.36–3.74)
UROBILINOGEN FLD QL: 0.2 MG/DL — SIGNIFICANT CHANGE UP (ref 0.2–1)
UROBILINOGEN FLD QL: 0.2 MG/DL — SIGNIFICANT CHANGE UP (ref 0.2–1)
VANCOMYCIN FLD-MCNC: 11.4 UG/ML — SIGNIFICANT CHANGE UP (ref 10–25)
VANCOMYCIN FLD-MCNC: 11.4 UG/ML — SIGNIFICANT CHANGE UP (ref 10–25)
WBC # BLD: 10.82 K/UL — HIGH (ref 3.8–10.5)
WBC # BLD: 10.82 K/UL — HIGH (ref 3.8–10.5)
WBC # BLD: 11.15 K/UL — HIGH (ref 3.8–10.5)
WBC # BLD: 11.15 K/UL — HIGH (ref 3.8–10.5)
WBC # BLD: 5.14 K/UL — SIGNIFICANT CHANGE UP (ref 3.8–10.5)
WBC # BLD: 5.14 K/UL — SIGNIFICANT CHANGE UP (ref 3.8–10.5)
WBC # BLD: 8.75 K/UL — SIGNIFICANT CHANGE UP (ref 3.8–10.5)
WBC # BLD: 8.75 K/UL — SIGNIFICANT CHANGE UP (ref 3.8–10.5)
WBC # FLD AUTO: 10.82 K/UL — HIGH (ref 3.8–10.5)
WBC # FLD AUTO: 10.82 K/UL — HIGH (ref 3.8–10.5)
WBC # FLD AUTO: 11.15 K/UL — HIGH (ref 3.8–10.5)
WBC # FLD AUTO: 11.15 K/UL — HIGH (ref 3.8–10.5)
WBC # FLD AUTO: 5.14 K/UL — SIGNIFICANT CHANGE UP (ref 3.8–10.5)
WBC # FLD AUTO: 5.14 K/UL — SIGNIFICANT CHANGE UP (ref 3.8–10.5)
WBC # FLD AUTO: 8.75 K/UL — SIGNIFICANT CHANGE UP (ref 3.8–10.5)
WBC # FLD AUTO: 8.75 K/UL — SIGNIFICANT CHANGE UP (ref 3.8–10.5)

## 2023-01-01 RX ORDER — ONDANSETRON 8 MG/1
4 TABLET, FILM COATED ORAL EVERY 8 HOURS
Refills: 0 | Status: DISCONTINUED | OUTPATIENT
Start: 2023-01-01 | End: 2023-01-01

## 2023-01-01 RX ORDER — ACETAMINOPHEN 500 MG
650 TABLET ORAL EVERY 6 HOURS
Refills: 0 | Status: DISCONTINUED | OUTPATIENT
Start: 2023-01-01 | End: 2023-01-01

## 2023-01-01 RX ORDER — FUROSEMIDE 40 MG
20 TABLET ORAL EVERY 12 HOURS
Refills: 0 | Status: DISCONTINUED | OUTPATIENT
Start: 2023-01-01 | End: 2023-01-01

## 2023-01-01 RX ORDER — SODIUM CHLORIDE 9 MG/ML
1000 INJECTION, SOLUTION INTRAVENOUS
Refills: 0 | Status: DISCONTINUED | OUTPATIENT
Start: 2023-01-01 | End: 2023-01-01

## 2023-01-01 RX ORDER — LACTOBACILLUS ACIDOPHILUS 100MM CELL
1 CAPSULE ORAL EVERY 12 HOURS
Refills: 0 | Status: DISCONTINUED | OUTPATIENT
Start: 2023-01-01 | End: 2023-01-01

## 2023-01-01 RX ORDER — ENOXAPARIN SODIUM 100 MG/ML
30 INJECTION SUBCUTANEOUS EVERY 24 HOURS
Refills: 0 | Status: DISCONTINUED | OUTPATIENT
Start: 2023-01-01 | End: 2023-01-01

## 2023-01-01 RX ORDER — ACETAMINOPHEN 500 MG
650 TABLET ORAL EVERY 4 HOURS
Refills: 0 | Status: DISCONTINUED | OUTPATIENT
Start: 2023-01-01 | End: 2023-01-01

## 2023-01-01 RX ORDER — INFLUENZA VIRUS VACCINE 15; 15; 15; 15 UG/.5ML; UG/.5ML; UG/.5ML; UG/.5ML
0.7 SUSPENSION INTRAMUSCULAR ONCE
Refills: 0 | Status: DISCONTINUED | OUTPATIENT
Start: 2023-01-01 | End: 2023-01-01

## 2023-01-01 RX ORDER — PIPERACILLIN AND TAZOBACTAM 4; .5 G/20ML; G/20ML
3.38 INJECTION, POWDER, LYOPHILIZED, FOR SOLUTION INTRAVENOUS ONCE
Refills: 0 | Status: COMPLETED | OUTPATIENT
Start: 2023-01-01 | End: 2023-01-01

## 2023-01-01 RX ORDER — MUPIROCIN 20 MG/G
1 OINTMENT TOPICAL EVERY 12 HOURS
Refills: 0 | Status: DISCONTINUED | OUTPATIENT
Start: 2023-01-01 | End: 2023-01-01

## 2023-01-01 RX ORDER — VANCOMYCIN HCL 1 G
1000 VIAL (EA) INTRAVENOUS ONCE
Refills: 0 | Status: COMPLETED | OUTPATIENT
Start: 2023-01-01 | End: 2023-01-01

## 2023-01-01 RX ORDER — HYDROMORPHONE HYDROCHLORIDE 2 MG/ML
0.5 INJECTION INTRAMUSCULAR; INTRAVENOUS; SUBCUTANEOUS
Refills: 0 | Status: DISCONTINUED | OUTPATIENT
Start: 2023-01-01 | End: 2023-01-01

## 2023-01-01 RX ORDER — VANCOMYCIN HCL 1 G
750 VIAL (EA) INTRAVENOUS ONCE
Refills: 0 | Status: COMPLETED | OUTPATIENT
Start: 2023-01-01 | End: 2023-01-01

## 2023-01-01 RX ORDER — ROBINUL 0.2 MG/ML
0.2 INJECTION INTRAMUSCULAR; INTRAVENOUS EVERY 6 HOURS
Refills: 0 | Status: DISCONTINUED | OUTPATIENT
Start: 2023-01-01 | End: 2023-01-01

## 2023-01-01 RX ORDER — PANTOPRAZOLE SODIUM 20 MG/1
40 TABLET, DELAYED RELEASE ORAL DAILY
Refills: 0 | Status: DISCONTINUED | OUTPATIENT
Start: 2023-01-01 | End: 2023-01-01

## 2023-01-01 RX ORDER — FUROSEMIDE 40 MG
40 TABLET ORAL ONCE
Refills: 0 | Status: COMPLETED | OUTPATIENT
Start: 2023-01-01 | End: 2023-01-01

## 2023-01-01 RX ORDER — IPRATROPIUM/ALBUTEROL SULFATE 18-103MCG
3 AEROSOL WITH ADAPTER (GRAM) INHALATION EVERY 6 HOURS
Refills: 0 | Status: DISCONTINUED | OUTPATIENT
Start: 2023-01-01 | End: 2023-01-01

## 2023-01-01 RX ORDER — LANOLIN ALCOHOL/MO/W.PET/CERES
3 CREAM (GRAM) TOPICAL AT BEDTIME
Refills: 0 | Status: DISCONTINUED | OUTPATIENT
Start: 2023-01-01 | End: 2023-01-01

## 2023-01-01 RX ORDER — PIPERACILLIN AND TAZOBACTAM 4; .5 G/20ML; G/20ML
3.38 INJECTION, POWDER, LYOPHILIZED, FOR SOLUTION INTRAVENOUS EVERY 8 HOURS
Refills: 0 | Status: DISCONTINUED | OUTPATIENT
Start: 2023-01-01 | End: 2023-01-01

## 2023-01-01 RX ORDER — DEXTROSE MONOHYDRATE, SODIUM CHLORIDE, AND POTASSIUM CHLORIDE 50; .745; 4.5 G/1000ML; G/1000ML; G/1000ML
1000 INJECTION, SOLUTION INTRAVENOUS
Refills: 0 | Status: DISCONTINUED | OUTPATIENT
Start: 2023-01-01 | End: 2023-01-01

## 2023-01-01 RX ORDER — MIDODRINE HYDROCHLORIDE 2.5 MG/1
5 TABLET ORAL THREE TIMES A DAY
Refills: 0 | Status: DISCONTINUED | OUTPATIENT
Start: 2023-01-01 | End: 2023-01-01

## 2023-01-01 RX ORDER — IPRATROPIUM/ALBUTEROL SULFATE 18-103MCG
3 AEROSOL WITH ADAPTER (GRAM) INHALATION ONCE
Refills: 0 | Status: COMPLETED | OUTPATIENT
Start: 2023-01-01 | End: 2023-01-01

## 2023-01-01 RX ADMIN — Medication 3 MILLILITER(S): at 20:16

## 2023-01-01 RX ADMIN — PIPERACILLIN AND TAZOBACTAM 25 GRAM(S): 4; .5 INJECTION, POWDER, LYOPHILIZED, FOR SOLUTION INTRAVENOUS at 07:25

## 2023-01-01 RX ADMIN — Medication 40 MILLIGRAM(S): at 19:53

## 2023-01-01 RX ADMIN — Medication 3 MILLILITER(S): at 00:59

## 2023-01-01 RX ADMIN — Medication 0.5 MILLIGRAM(S): at 05:09

## 2023-01-01 RX ADMIN — PANTOPRAZOLE SODIUM 40 MILLIGRAM(S): 20 TABLET, DELAYED RELEASE ORAL at 23:47

## 2023-01-01 RX ADMIN — ENOXAPARIN SODIUM 30 MILLIGRAM(S): 100 INJECTION SUBCUTANEOUS at 21:42

## 2023-01-01 RX ADMIN — PIPERACILLIN AND TAZOBACTAM 25 GRAM(S): 4; .5 INJECTION, POWDER, LYOPHILIZED, FOR SOLUTION INTRAVENOUS at 23:45

## 2023-01-01 RX ADMIN — PANTOPRAZOLE SODIUM 40 MILLIGRAM(S): 20 TABLET, DELAYED RELEASE ORAL at 14:17

## 2023-01-01 RX ADMIN — Medication 3 MILLILITER(S): at 13:43

## 2023-01-01 RX ADMIN — ENOXAPARIN SODIUM 30 MILLIGRAM(S): 100 INJECTION SUBCUTANEOUS at 22:10

## 2023-01-01 RX ADMIN — MIDODRINE HYDROCHLORIDE 5 MILLIGRAM(S): 2.5 TABLET ORAL at 23:49

## 2023-01-01 RX ADMIN — SODIUM CHLORIDE 40 MILLILITER(S): 9 INJECTION, SOLUTION INTRAVENOUS at 16:13

## 2023-01-01 RX ADMIN — Medication 3 MILLILITER(S): at 19:09

## 2023-01-01 RX ADMIN — PIPERACILLIN AND TAZOBACTAM 25 GRAM(S): 4; .5 INJECTION, POWDER, LYOPHILIZED, FOR SOLUTION INTRAVENOUS at 19:38

## 2023-01-01 RX ADMIN — Medication 3 MILLILITER(S): at 07:57

## 2023-01-01 RX ADMIN — Medication 3 MILLILITER(S): at 21:29

## 2023-01-01 RX ADMIN — Medication 40 MILLIGRAM(S): at 05:09

## 2023-01-01 RX ADMIN — Medication 3 MILLILITER(S): at 07:47

## 2023-01-01 RX ADMIN — Medication 3 MILLILITER(S): at 01:08

## 2023-01-01 RX ADMIN — Medication 1 TABLET(S): at 18:37

## 2023-01-01 RX ADMIN — Medication 3 MILLILITER(S): at 12:35

## 2023-01-01 RX ADMIN — PANTOPRAZOLE SODIUM 40 MILLIGRAM(S): 20 TABLET, DELAYED RELEASE ORAL at 11:33

## 2023-01-01 RX ADMIN — SODIUM CHLORIDE 30 MILLILITER(S): 9 INJECTION, SOLUTION INTRAVENOUS at 21:42

## 2023-01-01 RX ADMIN — PIPERACILLIN AND TAZOBACTAM 25 GRAM(S): 4; .5 INJECTION, POWDER, LYOPHILIZED, FOR SOLUTION INTRAVENOUS at 07:50

## 2023-01-01 RX ADMIN — Medication 3 MILLILITER(S): at 07:39

## 2023-01-01 RX ADMIN — MUPIROCIN 1 APPLICATION(S): 20 OINTMENT TOPICAL at 05:08

## 2023-01-01 RX ADMIN — Medication 1 TABLET(S): at 05:11

## 2023-01-01 RX ADMIN — PIPERACILLIN AND TAZOBACTAM 25 GRAM(S): 4; .5 INJECTION, POWDER, LYOPHILIZED, FOR SOLUTION INTRAVENOUS at 22:10

## 2023-01-01 RX ADMIN — Medication 40 MILLIGRAM(S): at 23:45

## 2023-01-01 RX ADMIN — Medication 250 MILLIGRAM(S): at 22:10

## 2023-01-01 RX ADMIN — PIPERACILLIN AND TAZOBACTAM 200 GRAM(S): 4; .5 INJECTION, POWDER, LYOPHILIZED, FOR SOLUTION INTRAVENOUS at 19:53

## 2023-01-01 RX ADMIN — Medication 1 TABLET(S): at 19:39

## 2023-01-01 RX ADMIN — Medication 650 MILLIGRAM(S): at 15:19

## 2023-01-01 RX ADMIN — MIDODRINE HYDROCHLORIDE 5 MILLIGRAM(S): 2.5 TABLET ORAL at 11:48

## 2023-01-01 RX ADMIN — PIPERACILLIN AND TAZOBACTAM 25 GRAM(S): 4; .5 INJECTION, POWDER, LYOPHILIZED, FOR SOLUTION INTRAVENOUS at 07:14

## 2023-01-01 RX ADMIN — Medication 40 MILLIGRAM(S): at 05:11

## 2023-01-01 RX ADMIN — ENOXAPARIN SODIUM 30 MILLIGRAM(S): 100 INJECTION SUBCUTANEOUS at 23:44

## 2023-01-01 RX ADMIN — Medication 1 TABLET(S): at 05:38

## 2023-01-01 RX ADMIN — PIPERACILLIN AND TAZOBACTAM 25 GRAM(S): 4; .5 INJECTION, POWDER, LYOPHILIZED, FOR SOLUTION INTRAVENOUS at 14:17

## 2023-01-01 RX ADMIN — Medication 3 MILLILITER(S): at 02:14

## 2023-01-01 RX ADMIN — Medication 40 MILLIGRAM(S): at 06:21

## 2023-01-01 RX ADMIN — SODIUM CHLORIDE 60 MILLILITER(S): 9 INJECTION, SOLUTION INTRAVENOUS at 08:24

## 2023-01-01 RX ADMIN — PANTOPRAZOLE SODIUM 40 MILLIGRAM(S): 20 TABLET, DELAYED RELEASE ORAL at 11:07

## 2023-01-01 RX ADMIN — Medication 650 MILLIGRAM(S): at 14:19

## 2023-01-01 RX ADMIN — Medication 250 MILLIGRAM(S): at 19:54

## 2023-01-01 RX ADMIN — PIPERACILLIN AND TAZOBACTAM 25 GRAM(S): 4; .5 INJECTION, POWDER, LYOPHILIZED, FOR SOLUTION INTRAVENOUS at 21:42

## 2023-03-03 NOTE — ED ADULT TRIAGE NOTE - WEIGHT IN KG
58.1 Zoryve Counseling:  I discussed with the patient that Zoryve is not for use in the eyes, mouth or vagina. The most commonly reported side effects include diarrhea, headache, insomnia, application site pain, upper respiratory tract infections, and urinary tract infections.  All of the patient's questions and concerns were addressed.

## 2023-07-26 NOTE — PHYSICAL THERAPY INITIAL EVALUATION ADULT - GAIT TRAINING, PT EVAL
Pt declines HM screenings at this time. Immunization's updated/triggered.  
3 to 5 Sessions Ambulate 100ft with RW Supervision

## 2023-10-09 NOTE — DISCHARGE NOTE NURSING/CASE MANAGEMENT/SOCIAL WORK - NSDCPEPTCAREGIVEDUMATLIST _GEN_ALL_CORE
Enoxaparin/Lovenox Gabapentin Counseling: I discussed with the patient the risks of gabapentin including but not limited to dizziness, somnolence, fatigue and ataxia.

## 2023-10-16 NOTE — ED PROVIDER NOTE - OBJECTIVE STATEMENT
The patient is a 89y female with pmhx of The patient is a 89y female with pmhx of HTN, HLD, CVA, depression, GERD presenting from [facility] with increased SOB. Prior to arrival to ED, pt was given lasix, solu-medrol, and one duoneb. Pt is DNR/DNI. Arrives to ED on CPAP. Pt unable to speak much due to SOB, endorsing SOB, denies CP, unable to provide much other hx. No hx of CHF or COPD per chart review. Pt reports difficulty breathing started last night. Admits to associated fatigue. Per EMS, patient was profoundly tachypneic on arrival, satting 70s on RA. NKDA.

## 2023-10-16 NOTE — CONSULT NOTE ADULT - ASSESSMENT
89 y/o Female with PMH of HTN, HLD, CVA (w/ Residual ), Depression, GERD presents to  ED [facility]IB[facility] from [facility] complaining of shortness of breath with:       1. Acute Hypoxic Respiratory Failure   2. Acute Pulmonary Edema   3. Pleural Effusions  4. R/O Pneumonia           -On arrival to ED, patient in significant respiratory distress, hypoxic to 70s. Placed on NC with minimal improvement and work of breathing. Transitioned to NIPPV with significant improvement in oxygenation. CXR on admission with B/L hazy infiltrates, likely component of acute pulmonary edema. Recommend diuresis with Lasix. Order TTE, with Cardiology follow up in AM. Clinically with significant improvement in respiratory status. Repeat ABG @21:00. Recommend transition off NIPPV to NC in AM.   -Given profound hypoxia, concern for possible PE. Ddimer negative, with absent tachycardia and hypotension. Low likelihood. CT Angio ordered, with No pulmonary embolism., Moderate right pleural effusion and small left pleural effusion with associated atelectasis. Diffuse airspace opacities throughout the bilateral lungs. US Duplex B/L LE pending.   -No overt s/s of underlying cardiac event. Patient does not endorse chest pain/ pressure. Troponin negative x1, repeat pending. EKG on admission with no acute pathology.   -Respiratory status likely multifactorial in setting of acute pulmonary edema v pneumonia. CT chest with diffuse opacities in B/L lungs, concerning for underlying infection. s/p Vancomycin and Zosyn in ED. However, given possibility of superimposed infection would continue empiric coverage with Zosyn. Recommend BCx, SCx, UA, UCx, Legionella and Strep Pneumo.         Patient does not require ICU level care at this time. Please re-consult if patient status changes. Plan discussed with ED Attending and eICU Attending. Patient understanding and agreeable to plan  89 y/o Female with PMH of HTN, HLD, CVA (w/ Residual ), Depression, GERD presents to  ED BIBA from SNF complaining of shortness of breath with:       1. Acute Hypoxic Respiratory Failure   2. Acute Pulmonary Edema   3. Pleural Effusions  4. R/O Pneumonia           -On arrival to ED, patient in significant respiratory distress, hypoxic to 70s. Placed on NC with minimal improvement and work of breathing. Transitioned to NIPPV with significant improvement in oxygenation. CXR on admission with B/L hazy infiltrates, likely component of acute pulmonary edema. Recommend diuresis with Lasix. Order TTE, with Cardiology follow up in AM. Clinically with significant improvement in respiratory status. Repeat ABG @21:00. Recommend transition off NIPPV to NC in AM.   -Given profound hypoxia, concern for possible PE. Ddimer negative, with absent tachycardia and hypotension. Low likelihood. CT Angio ordered, with No pulmonary embolism., Moderate right pleural effusion and small left pleural effusion with associated atelectasis. Diffuse airspace opacities throughout the bilateral lungs. US Duplex B/L LE pending.   -No overt s/s of underlying cardiac event. Patient does not endorse chest pain/ pressure. Troponin negative x1, repeat pending. EKG on admission with no acute pathology.   -Respiratory status likely multifactorial in setting of acute pulmonary edema v pneumonia. CT chest with diffuse opacities in B/L lungs, concerning for underlying infection. s/p Vancomycin and Zosyn in ED. However, given possibility of superimposed infection would continue empiric coverage with Zosyn. Recommend BCx, SCx, UA, UCx, Legionella and Strep Pneumo.         Patient does not require ICU level care at this time. Please re-consult if patient status changes. Recommend admission to Telemetry with . Plan discussed with ED Attending and eICU Attending. Patient understanding and agreeable to plan.

## 2023-10-16 NOTE — PROVIDER CONTACT NOTE (EICU) - RECOMMENDATIONS
- agree with NIPPV - improved on BiPAP; aim for Osats >92% and normocarbia   - start PRN nebs  - cont empiric Abx; BCx in progress, add urine Legionella and Pneum. AG, procal  - received LAsix 40 IV, monitor UOP, re-eval for additional dose as needed  - maintain MAP >60, SBP >90  - pending TTE   - keep NPO for now  - case discussed with onsite MICU team; agree with management in non-ICU settings for now

## 2023-10-16 NOTE — ED ADULT TRIAGE NOTE - CHIEF COMPLAINT QUOTE
From [facility]. increased SOB.  NOted DNR/DNI.  noted pulse ox on R/A on arrival of EMS 74%

## 2023-10-16 NOTE — ED PROVIDER NOTE - PHYSICAL EXAMINATION
GEN - In moderate distress, A&Ox1-2  HEAD - NC/AT  EYES - PERRL, EOMI  ENT - Airway patent, mucous membranes moist  PULMONARY - Increased WOB, diffuse crackles with decreased breath sounds at L and R bases, satting 97% on BiPAP  CARDIAC - +S1S2, NSR but tachy to 100s, no M/G/R, no JVD  ABDOMEN - +BS, ND, NT, soft, no guarding, no rebound, no masses, no rigidity  EXTREMITIES - FROM, symmetric pulses, no LE edema b/l  SKIN - No rash or bruising  NEUROLOGIC - Alert, speech clear, no focal deficits  PSYCH - Normal mood/affect, normal insight

## 2023-10-16 NOTE — CONSULT NOTE ADULT - ASSESSMENT
Initial evaluation/Pulmonary Critical Care consultation requested by DR VIRGEN  on  10/16/2023  from Dr Brett Chahal    Patient examined chart reviewed    HOSPITAL ADMISSION   PATIENT CAME  FROM (if information available)      GENERAL DATA .     GOC.  .. 10/16/2023 full code   ICU STAY. .. none  COVID. ..  scv2 10/16/2023 (-)   BEST PRACTICE ISSUES.    HOB ELEVATN. .. Yes  DVT PPLX. ..      SPEECH SWALLOW RECOMMENDATIONS.    ..      10/16/2023 speech eval   DIET.  ..  10/16/2023 npo   IV fl ...   STRESS ULCER PPLX. .. 10/16/2023 protonix       INFECTION PPLX. ..     ALLGY. ..  unknown                       WT. ..  10/16/2023 56  BMI. ..      PROCEDURES.    ABGS.   . 10/16/2023 8p bpap 100% 12/8 734/58/232  . 10/16/2023 5p 732/60/121     VS/ PO/IO/ VENT/ DRIPS.   10/16/2023 96 69 110/57   10/16/2023 bpap 12/8/16/100     DOA C/C.     . 10/16/2023 From [facility]. increased SOB.  NOted DNR/DNI.  noted pulse ox on R/A on arrival of EMS 74%   INITIAL PRESENTATION.   . 10/16/2023 89 y/o Female with PMH of HTN, HLD, CVA (w/ Residual ), Depression, GERD presents to  ED BIBA from [facility] complaining of shortness of breath with:   . On arrival to ED, patient in significant respiratory distress, hypoxic to 70s. Placed on NIPPV with significant improvement in oxygenation. CXR on admission with B/L hazy infiltrates, likely component of acute pulmonary edema.  . Ddimer negative, with absent tachycardia and hypotension. Low likelihood.   . Moderate right pleural effusion and small left pleural effusion with associated atelectasis.   . Patient does not endorse chest pain/ pressure. Troponin negative x1,  . 10/16/2023 Pulm consultd   er MANAGEMENT   .. 10/16/2023 7p lasix 40zosyn  vanco   .. 10/16/2023 9p duoneb       PMH.   HOME MEDS.   COURSE.     PROBLEMS/ASSESSMENT/RECOMMENDATIONS (A/R).  PULM.  . RO VTE   .. v duplx 10/16/2023 (-)  .. ct cap ic 10/16/2023  no pe   .. No pe  .. start dvt pplx    . Pl effsn  .. ct cap ic 10/16/2023   .... no pe   .... mod r effs  sm l effsn   .. Will plan thoracentesis     INFECTN  . Pneum   .. W 10/16/2023 w 11.1   .. ua 10/16/2023 w 8 bact few   .. ct cap ic 10/16/2023   .... no pe   .... mod r effs  sm l effsn   .... diffuse airspace opac bl   .... 3.2 cm r adnexal cyst   .. flu ab 10/16/2023 (-)   .. rsv 10/16/2023 (-)   .. 10/16/2023 continue zosyn  .. 10/16/2023 check legn procalc mrsa strep pneu    CARDIAC.  . CHF.  .. bnp 10/16/2023 bnp 1328   .. given lasix   .. 10/16/2023 check tte    . RO MI.   .. Tr 10/16-10/16/2023 Tr 18-21   .. monitor     HEMAT.  .. Hb 10/16/2023 Hb 12.8   .. monitor     GI.  . GERD   .. ppi     RENAL.  .. Na 10/16/2023 Na 137   .. Co2 10/16/2023 co2 33  .. cr 10/16/2023 cr .9   .. monitor     SUMMARY.  . 89 f MH of HTN, HLD, CVA (w/ Residual ), Depression, GERD presents to [facility] ED BIBA from [facility] complaining of shortness of breath  admitted 10/16/2023 with hypoxic resp failure pneum chf     MAIN ISSUES.  . Acute hypercapnic  Respiratory Failure 10/16/2023 BPAP startd    . 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 VTE ruled out  10/16 V duplx and CTA (-)   . Pl effsn 10/16/2023 will plan diagn thora  . Hypothermia 10/16/2023 Check tsh cortisol  . Pneumonia 10/16/2023 Cont zosyn check mrsa legn bc uc   . Acute Pulmonary Edema 10/16/2023 Given lasix Check tte      TIME SPENT.   . Over 55 minutes aggregate care time spent on encounter; activities included   direct patient care, counseling and/or coordinating care reviewing notes, lab data/ imaging , discussion with multidisciplinary team/ patient  /family and explaining in detail risks, benefits, alternatives  of the recommendations     MANUELA GARCIA 89 f 10/16/2023 1/3/1928 DR JEREMY MOCTEZUMA

## 2023-10-16 NOTE — CONSULT NOTE ADULT - SUBJECTIVE AND OBJECTIVE BOX
[facility] [place]     Patient is a 89y old  Female who presents with a chief complaint of Acute Hypoxic Respiratory Failure (16 Oct 2023 19:42)      HPI:      HPI:    89y Female for Cardiology Consult    PAST MEDICAL & SURGICAL HISTORY:      FAMILY HISTORY:      SOCIAL HISTORY:   Alcohol:  Smoking:    Allergies    Allergy Status Unknown    Intolerances        MEDICATIONS  (STANDING):  albuterol/ipratropium for Nebulization 3 milliLiter(s) Nebulizer every 6 hours  enoxaparin Injectable 30 milliGRAM(s) SubCutaneous every 24 hours  furosemide   Injectable 20 milliGRAM(s) IV Push every 12 hours  methylPREDNISolone sodium succinate Injectable 40 milliGRAM(s) IV Push once  methylPREDNISolone sodium succinate Injectable 40 milliGRAM(s) IV Push daily  piperacillin/tazobactam IVPB.. 3.375 Gram(s) IV Intermittent every 8 hours    MEDICATIONS  (PRN):      REVIEW OF SYSTEMS:  CONSTITUTIONAL: No fever, weight loss, chills, shakes, or fat  RESPIRATORY: No cough, wheezing, hemoptysis, or shortness of breath  CARDIOVASCULAR: No chest pain, dyspnea, palpitations, dizziness, syncope, paroxysmal nocturnal dyspnea, orthopnea, or arm or leg swelling  GASTROINTESTINAL: No abdominal  or epigastric pain, nausea, vomiting, hematemesis, diarrhea, constipation, melena or bright red bloo  NEUROLOGICAL: No headaches, memory loss, slurred speech, limb weakness, loss of strength, numbness, or tremors  SKIN: No itching, burning, rashes, or lesions  ENDOCRINE: No heat or cold intolerance, or hair loss  MUSCULOSKELETAL: No joint pain or swelling, muscle, back, or extremity pain  HEME/LYMPH: No easy bruising or bleeding gums  ALLERY AND IMMUNOLOGIC: No hives or rash.    Vital Signs Last 24 Hrs  T(C): 35.9 (16 Oct 2023 17:30), Max: 35.9 (16 Oct 2023 17:30)  T(F): 96.6 (16 Oct 2023 17:30), Max: 96.6 (16 Oct 2023 17:30)  HR: 103 (16 Oct 2023 20:27) (69 - 103)  BP: 111/57 (16 Oct 2023 17:30) (111/57 - 111/57)  BP(mean): --  RR: 1 (16 Oct 2023 17:30) (1 - 1)  SpO2: 100% (16 Oct 2023 20:27) (93% - 100%)    Parameters below as of 16 Oct 2023 17:30  Patient On (Oxygen Delivery Method): BiPAP/CPAP  O2 Flow (L/min): 12      PHYSICAL EXAM:  HEAD:  Atraumatic, Normocephalic  EYES: EOMI, PERRLA, conjunctiva and sclera clear  NECK: Supple and normal thyroid.  No JVD or carotid bruit.   HEART: S1, S2 regular , 1/6 soft ejection systolic murmur in mitral area , no thrill and no gallops .  PULMONARY: Bilateral vesicular breathing , few scattered ronchi and few scattered rales are present .  ABDOMEN: Soft nontender and positive bowl sounds   EXTREMITIES:  No clubbing, cyanosis, or pedal  edema  NEUROLOGICAL: AAOX3 , no focal deficit .    LABS:                        12.8   11.15 )-----------( 298      ( 16 Oct 2023 17:05 )             42.1     10-16    137  |  100  |  17  ----------------------------<  219<H>  4.3   |  33<H>  |  0.95    Ca    9.0      16 Oct 2023 17:05  Mg     2.3     10-16    TPro  8.2  /  Alb  3.4  /  TBili  0.4  /  DBili  x   /  AST  25  /  ALT  18  /  AlkPhos  111  10-16        PT/INR - ( 16 Oct 2023 17:05 )   PT: 11.0 sec;   INR: 0.94 ratio         PTT - ( 16 Oct 2023 17:05 )  PTT:32.3 sec  Urinalysis Basic - ( 16 Oct 2023 20:43 )    Color: Yellow / Appearance: Clear / S.026 / pH: x  Gluc: x / Ketone: Negative mg/dL  / Bili: Negative / Urobili: 0.2 mg/dL   Blood: x / Protein: Trace mg/dL / Nitrite: Negative   Leuk Esterase: Small / RBC: 2 /HPF / WBC 8 /HPF   Sq Epi: x / Non Sq Epi: x / Bacteria: Few /HPF      BNP      EKG:  ECHO:  IMAGING:    Assessment and Plan :     Will continue to follow during hospital course and give further recommendations as needed . Thanks for your referral . if any questions please contact me at office [phone] [phone])  VAZQUEZ SWENSON MD [place] [id number]  [address]  OFFICE : [phone]  CELL : [phone]  CARDIOLOGY CONSULT :     Patient is a 89y old  Female who presents with a chief complaint of Acute Hypoxic Respiratory Failure (16 Oct 2023 19:42)      HPI:  . 10/16/2023 89 y/o Female with PMH of HTN, HLD, CVA (w/ Residual ), Depression, GERD presents to  ED BIBA from SNF complaining of shortness of breath with:   . On arrival to ED, patient in significant respiratory distress, hypoxic to 70s. Placed on NIPPV with significant improvement in oxygenation. CXR on admission with B/L hazy infiltrates, likely component of acute pulmonary edema.  . Ddimer negative, with absent tachycardia and hypotension. Low likelihood.   . Moderate right pleural effusion and small left pleural effusion with associated atelectasis.   . Patient does not endorse chest pain/ pressure. Troponin negative x1,  . 10/16/2023 Pulm consultd       HPI:    89y Female for Cardiology Consult    PAST MEDICAL & SURGICAL HISTORY:      FAMILY HISTORY:      SOCIAL HISTORY:   Alcohol:  Smoking:    Allergies    Allergy Status Unknown    Intolerances        MEDICATIONS  (STANDING):  albuterol/ipratropium for Nebulization 3 milliLiter(s) Nebulizer every 6 hours  enoxaparin Injectable 30 milliGRAM(s) SubCutaneous every 24 hours  furosemide   Injectable 20 milliGRAM(s) IV Push every 12 hours  methylPREDNISolone sodium succinate Injectable 40 milliGRAM(s) IV Push once  methylPREDNISolone sodium succinate Injectable 40 milliGRAM(s) IV Push daily  piperacillin/tazobactam IVPB.. 3.375 Gram(s) IV Intermittent every 8 hours    MEDICATIONS  (PRN):    Vital Signs Last 24 Hrs  T(C): 35.9 (16 Oct 2023 17:30), Max: 35.9 (16 Oct 2023 17:30)  T(F): 96.6 (16 Oct 2023 17:30), Max: 96.6 (16 Oct 2023 17:30)  HR: 103 (16 Oct 2023 20:27) (69 - 103)  BP: 111/57 (16 Oct 2023 17:30) (111/57 - 111/57)  BP(mean): --  RR: 1 (16 Oct 2023 17:30) (1 - 1)  SpO2: 100% (16 Oct 2023 20:27) (93% - 100%)    Parameters below as of 16 Oct 2023 17:30  Patient On (Oxygen Delivery Method): BiPAP/CPAP  O2 Flow (L/min): 12      LABS:                        12.8   11.15 )-----------( 298      ( 16 Oct 2023 17:05 )             42.1     10-16    137  |  100  |  17  ----------------------------<  219<H>  4.3   |  33<H>  |  0.95    Ca    9.0      16 Oct 2023 17:05  Mg     2.3     10-16    TPro  8.2  /  Alb  3.4  /  TBili  0.4  /  DBili  x   /  AST  25  /  ALT  18  /  AlkPhos  111  10-16        PT/INR - ( 16 Oct 2023 17:05 )   PT: 11.0 sec;   INR: 0.94 ratio         PTT - ( 16 Oct 2023 17:05 )  PTT:32.3 sec  Urinalysis Basic - ( 16 Oct 2023 20:43 )    Color: Yellow / Appearance: Clear / S.026 / pH: x  Gluc: x / Ketone: Negative mg/dL  / Bili: Negative / Urobili: 0.2 mg/dL   Blood: x / Protein: Trace mg/dL / Nitrite: Negative   Leuk Esterase: Small / RBC: 2 /HPF / WBC 8 /HPF   Sq Epi: x / Non Sq Epi: x / Bacteria: Few /HPF      Assessment and Plan :   FULL CONSULT DICTATED .  89 years old female with H/O hypertension , dementia has SOB abd respiratory distress and hypoxia and CO2 retaining and put on Bi-pap and has possible aspiration pneumonia and bronchospasm and CHF . Continue bronchodilators and I/V antibiotics and I/V Lasix . patient condition critical and prognosis guarded . Rest of medications as such .  Will continue to follow during hospital course and give further recommendations as needed . Thanks for your referral . if any questions please contact me at office ([phone] cell [phone])

## 2023-10-16 NOTE — ED PROVIDER NOTE - PROGRESS NOTE DETAILS
Denver Goodrich MD (Attending Physician): MICU evaluated pt, said that pt can go to the medicine floor. Pt tolerating bipap well, ICU thinks that pt can come off bipap in a few hours. CT chest shows pulmonary edema vs. PNA.

## 2023-10-16 NOTE — ED PROVIDER NOTE - DISPOSITION TYPE
Date of Procedure: 10/30/2019  PROCEDURE NOTE     Consent was obtained from the patient. The correct site was identified after confirmation with the patient. Following identification and confirmation of the correct site with the patient, the superolateral left knee was prepped in the normal sterile fashion. A local anesthetic of 1% lidocaine without epinephrine was then administered to the local tissues. Following, an injection of Synvisc viscosupplementation prefilled syringe was administered to the left knee. The needle was then withdrawn and the injection site dressed with a sterile bandage at the conclusion. The procedure was well tolerated by the patient. No immediate adverse reactions were noted. Post injection instructions were given. ADMIT

## 2023-10-16 NOTE — PROVIDER CONTACT NOTE (EICU) - ASSESSMENT
89 yo F with acute mixed resp. failure due to suspected BL PNA, contribution of pulmonary edema with BL pl. effusions R (moderate) >L. Cardiomegaly noted.  CTA chest /BL LE negative for VTE. Unlikely acute cardiac ischemic event with no other symptoms, negative trops x2, EKG reported as with no acute pathology.

## 2023-10-16 NOTE — ED ADULT NURSE NOTE - CHIEF COMPLAINT QUOTE
From [facility]. increased SOB.  NOted DNR/DNI.  noted pulse ox on R/A on arrival of EMS 74%

## 2023-10-16 NOTE — ED ADULT NURSE NOTE - NSFALLHARMRISKINTERV_ED_ALL_ED
Assistance OOB with selected safe patient handling equipment if applicable/Assistance with ambulation/Communicate risk of Fall with Harm to all staff, patient, and family/Monitor gait and stability/Monitor for mental status changes and reorient to person, place, and time, as needed/Move patient closer to nursing station/within visual sight of ED staff/Provide visual cue: red socks, yellow wristband, yellow gown, etc/Reinforce activity limits and safety measures with patient and family/Toileting schedule using arm’s reach rule for commode and bathroom/Use of alarms - bed, stretcher, chair and/or video monitoring/Bed in lowest position, wheels locked, appropriate side rails in place/Call bell, personal items and telephone in reach/Instruct patient to call for assistance before getting out of bed/chair/stretcher/Non-slip footwear applied when patient is off stretcher/[place] to call system/Physically safe environment - no spills, clutter or unnecessary equipment/Purposeful Proactive Rounding/Room/bathroom lighting operational, light cord in reach

## 2023-10-16 NOTE — CONSULT NOTE ADULT - SUBJECTIVE AND OBJECTIVE BOX
Patient is a 89y old  Female who presents with a chief complaint of     BRIEF HOSPITAL COURSE-  89 y/o Female with PMH of HTN, HLD, CVA (w/ Residual ), Depression, GERD presents to  ED BIBA from SNF complaining of shortness of breath. Reports difficulty breathing starting last night. States she was given multiple medications, including oxygen without relief. Admits to associated fatigue. Per EMS, patient profoundly tachypneic , satting 70s on RA.       Review of Systems:  CONSTITUTIONAL: +Fatigue. No fever or chills.  EYES: No eye pain, visual disturbances, or discharge.  ENMT:  No difficulty hearing, tinnitus, vertigo. No sinus or throat pain.  NECK: No pain or stiffness.  RESPIRATORY: +Shortness of breath. No cough, wheezing, chills or hemoptysis.   CARDIOVASCULAR: No chest pain, palpitations, dizziness, or leg swelling.  GASTROINTESTINAL: No abdominal or epigastric pain. No nausea, vomiting, or hematemesis. No diarrhea or constipation. No melena or hematochezia.  GENITOURINARY: No dysuria, frequency, hematuria, or incontinence.  NEUROLOGICAL: No headaches, memory loss, loss of strength, numbness, or tremors.  SKIN: No itching, burning, rashes, or lesions.   MUSCULOSKELETAL: No joint pain or swelling. No muscle, back, or extremity pain.  PSYCHIATRIC: No depression, anxiety, mood swings, or difficulty sleeping.        PAST MEDICAL & SURGICAL HISTORY-        Medications:  piperacillin/tazobactam IVPB... 3.375 Gram(s) IV Intermittent once  vancomycin  IVPB. 1000 milliGRAM(s) IV Intermittent once    furosemide   Injectable 40 milliGRAM(s) IV Push Once        ICU Vital Signs Last 24 Hrs  T(C): 35.9 (16 Oct 2023 17:30), Max: 35.9 (16 Oct 2023 17:30)  T(F): 96.6 (16 Oct 2023 17:30), Max: 96.6 (16 Oct 2023 17:30)  HR: 69 (16 Oct 2023 17:30) (69 - 71)  BP: 111/57 (16 Oct 2023 17:30) (111/57 - 111/57)  BP(mean): --  ABP: --  ABP(mean): --  RR: 1 (16 Oct 2023 17:30) (1 - 1)  SpO2: 99% (16 Oct 2023 17:30) (93% - 99%)    O2 Parameters below as of 16 Oct 2023 17:30  Patient On (Oxygen Delivery Method): BiPAP/CPAP  O2 Flow (L/min): 12      ABG - ( 16 Oct 2023 17:40 )  pH, Arterial: 7.32  pH, Blood: x     /  pCO2: 60    /  pO2: 121   / HCO3: 31    / Base Excess: 4.8   /  SaO2: 99.1          I&O's Detail        LABS:                        12.8   11.15 )-----------( 298      ( 16 Oct 2023 17:05 )             42.1       137  |  100  |  17  ----------------------------<  219<H>  4.3   |  33<H>  |  0.95    Ca    9.0      16 Oct 2023 17:05  Mg     2.3     10-16    TPro  8.2  /  Alb  3.4  /  TBili  0.4  /  DBili  x   /  AST  25  /  ALT  18  /  AlkPhos  111  10-16      CAPILLARY BLOOD GLUCOSE  POCT Blood Glucose.: 207 mg/dL (16 Oct 2023 17:13)      PT/INR - ( 16 Oct 2023 17:05 )   PT: 11.0 sec;   INR: 0.94 ratio    PTT - ( 16 Oct 2023 17:05 )  PTT:32.3 sec      Urinalysis Basic - ( 16 Oct 2023 17:05 )  Color: x / Appearance: x / SG: x / pH: x  Gluc: 219 mg/dL / Ketone: x  / Bili: x / Urobili: x   Blood: x / Protein: x / Nitrite: x   Leuk Esterase: x / RBC: x / WBC x   Sq Epi: x / Non Sq Epi: x / Bacteria: x      CULTURES:      Physical Examination:  General: Elderly female, In no acute distress.    HEENT: Pupils equal, reactive to light. Symmetric.  PULM: Diminished/course to auscultation bilaterally. No significant sputum production.  NECK: Supple, no lymphadenopathy, trachea midline.  CVS: Regular rate and rhythm. No murmurs, rubs, or gallops. S1, S2 intact.  ABD: Soft, nondistended, nontender, normoactive bowel sounds. No masses palpable.   EXT: No edema, nontender.  SKIN: Warm and well perfused, no rashes noted.  NEURO: Alert, oriented, interactive, nonfocal.  DEVICES:       RADIOLOGY-    < from: CT Angio Chest PE Protocol w/ IV Cont (10.16.23 @ 18:22) >  ACC: [id number] EXAM:  CT ANGIO CHEST PULM ART WAWIC   ORDERED BY:  SHRUTHI IRAHETA     ACC: [id number] EXAM:  CT ABDOMEN AND PELVIS IC   ORDERED BY:  SHRUTHI IRAHETA     PROCEDURE DATE:  10/16/2023      INTERPRETATION:  CLINICAL INFORMATION: Respiratory failure. Edema.   Pneumonia. Question pulmonary embolism    COMPARISON: None.    CONTRAST/COMPLICATIONS:  IV Contrast: Omnipaque 350 (accession [id number]), IV contrast documented   in unlinked concurrent exam (accession [id number])  90 cc administered  10   cc discarded  Oral Contrast: NONE  Complications: None reported at time of study completion    PROCEDURE:  CT Angiography of the Chest was performed followed by portal venous phase   imaging of the Abdomen and Pelvis.  Sagittal and coronal reformats were performed as well as 3D (MIP)   reconstructions.    FINDINGS:  CHEST:  LUNGS PLEURA AND LARGE AIRWAYS: Patent central airways.  Moderate right pleural effusion and small left pleural effusion with   associated atelectasis. Diffuse airspaceopacities throughout the   bilateral lungs. Differential includes both infection and/or edema.   Correlate clinically. Recommend short-term follow-up to ensure resolution.    VESSELS: No pulmonary embolism.  HEART: Cardiomegaly. Coronary artery calcifications. No pericardial   effusion.  MEDIASTINUM AND EVA: No lymphadenopathy.  CHEST WALL AND LOWER NECK: Within normal limits.    ABDOMEN AND PELVIS:  LIVER: A few small hypodensities too small to characterize.  BILE DUCTS: Normal caliber.  GALLBLADDER: Within normal limits.  SPLEEN: Within normal limits.  PANCREAS: Within normal limits.  ADRENALS: Within normal limits.  KIDNEYS/URETERS: Small right renal cyst.    BLADDER: Within normal limits.  REPRODUCTIVE ORGANS: 3.2 cm right adnexal cyst. Recommend nonemergent   pelvic ultrasound.    BOWEL: Moderate amount of stool in rectum. No rectal wall thickening.   Scattered small colonic diverticula without diverticulitis. No bowel   obstruction. Appendix is normal.  PERITONEUM: No ascites.  VESSELS: Atherosclerotic changes.  RETROPERITONEUM/LYMPH NODES: No lymphadenopathy.  ABDOMINAL WALL: Within normal limits.  BONES: Degenerative changes. Cirrhosis.    IMPRESSION:    No pulmonary embolism.    Moderate right pleural effusion and small leftpleural effusion with   associated atelectasis. Diffuse airspace opacities throughout the   bilateral lungs. Differential includes both infection and/or edema.   Correlate clinically. Recommend short-term follow-up to ensure resolution.    3.2 cm right adnexal cyst. Recommend nonemergent pelvic ultrasound.      --- End of Report ---    REESE TODD MD; Attending Radiologist  This document has been electronically signed. Oct 16 2023  6:39PM    < end of copied text >        CRITICAL CARE TIME SPENT: 109 minutes assessing presenting problems of acute illness, which pose high probability of life threatening deterioration or end organ damage/dysfunction, as well as medical decision making including initiating plan of care, reviewing data, reviewing radiologic exams, discussing with multidisciplinary team,  discussing goals of care with patient/family, and writing this note.  Non-inclusive of procedures performed,   Patient is a 89y old  Female who presents with a chief complaint of     BRIEF HOSPITAL COURSE-  89 y/o Female with PMH of HTN, HLD, CVA (w/ Residual ), Depression, GERD presents to  ED BIBA from SNF complaining of shortness of breath. Reports difficulty breathing starting last night. States she was given multiple medications, including oxygen without relief. Admits to associated fatigue. Per EMS, patient profoundly tachypneic, satting 70s on RA.       Review of Systems:  CONSTITUTIONAL: +Fatigue. No fever or chills.  EYES: No eye pain, visual disturbances, or discharge.  ENMT:  No difficulty hearing, tinnitus, vertigo. No sinus or throat pain.  NECK: No pain or stiffness.  RESPIRATORY: +Shortness of breath. No cough, wheezing, chills or hemoptysis.   CARDIOVASCULAR: No chest pain, palpitations, dizziness, or leg swelling.  GASTROINTESTINAL: No abdominal or epigastric pain. No nausea, vomiting, or hematemesis. No diarrhea or constipation. No melena or hematochezia.  GENITOURINARY: No dysuria, frequency, hematuria, or incontinence.  NEUROLOGICAL: No headaches, memory loss, loss of strength, numbness, or tremors.  SKIN: No itching, burning, rashes, or lesions.   MUSCULOSKELETAL: No joint pain or swelling. No muscle, back, or extremity pain.  PSYCHIATRIC: No depression, anxiety, mood swings, or difficulty sleeping.        PAST MEDICAL & SURGICAL HISTORY-        Medications:  piperacillin/tazobactam IVPB... 3.375 Gram(s) IV Intermittent once  vancomycin  IVPB. 1000 milliGRAM(s) IV Intermittent once    furosemide   Injectable 40 milliGRAM(s) IV Push Once        ICU Vital Signs Last 24 Hrs  T(C): 35.9 (16 Oct 2023 17:30), Max: 35.9 (16 Oct 2023 17:30)  T(F): 96.6 (16 Oct 2023 17:30), Max: 96.6 (16 Oct 2023 17:30)  HR: 69 (16 Oct 2023 17:30) (69 - 71)  BP: 111/57 (16 Oct 2023 17:30) (111/57 - 111/57)  BP(mean): --  ABP: --  ABP(mean): --  RR: 1 (16 Oct 2023 17:30) (1 - 1)  SpO2: 99% (16 Oct 2023 17:30) (93% - 99%)    O2 Parameters below as of 16 Oct 2023 17:30  Patient On (Oxygen Delivery Method): BiPAP/CPAP  O2 Flow (L/min): 12      ABG - ( 16 Oct 2023 17:40 )  pH, Arterial: 7.32  pH, Blood: x     /  pCO2: 60    /  pO2: 121   / HCO3: 31    / Base Excess: 4.8   /  SaO2: 99.1          I&O's Detail        LABS:                        12.8   11.15 )-----------( 298      ( 16 Oct 2023 17:05 )             42.1       137  |  100  |  17  ----------------------------<  219<H>  4.3   |  33<H>  |  0.95    Ca    9.0      16 Oct 2023 17:05  Mg     2.3     10-16    TPro  8.2  /  Alb  3.4  /  TBili  0.4  /  DBili  x   /  AST  25  /  ALT  18  /  AlkPhos  111  10-16      CAPILLARY BLOOD GLUCOSE  POCT Blood Glucose.: 207 mg/dL (16 Oct 2023 17:13)      PT/INR - ( 16 Oct 2023 17:05 )   PT: 11.0 sec;   INR: 0.94 ratio    PTT - ( 16 Oct 2023 17:05 )  PTT:32.3 sec      Urinalysis Basic - ( 16 Oct 2023 17:05 )  Color: x / Appearance: x / SG: x / pH: x  Gluc: 219 mg/dL / Ketone: x  / Bili: x / Urobili: x   Blood: x / Protein: x / Nitrite: x   Leuk Esterase: x / RBC: x / WBC x   Sq Epi: x / Non Sq Epi: x / Bacteria: x      CULTURES:      Physical Examination:  General: Elderly female, In no acute distress.    HEENT: Pupils equal, reactive to light. Symmetric.  PULM: Diminished/course to auscultation bilaterally. No significant sputum production.  NECK: Supple, no lymphadenopathy, trachea midline.  CVS: Regular rate and rhythm. No murmurs, rubs, or gallops. S1, S2 intact.  ABD: Soft, nondistended, nontender, normoactive bowel sounds. No masses palpable.   EXT: No edema, nontender.  SKIN: Warm and well perfused, no rashes noted.  NEURO: Alert, oriented, interactive, nonfocal.  DEVICES:       RADIOLOGY-    < from: CT Angio Chest PE Protocol w/ IV Cont (10.16.23 @ 18:22) >  ACC: [id number] EXAM:  CT ANGIO CHEST PULM ART WAWIC   ORDERED BY:  SHRUTHI IRAHETA     ACC: [id number] EXAM:  CT ABDOMEN AND PELVIS IC   ORDERED BY:  SHRUTHI IRAHETA     PROCEDURE DATE:  10/16/2023      INTERPRETATION:  CLINICAL INFORMATION: Respiratory failure. Edema.   Pneumonia. Question pulmonary embolism    COMPARISON: None.    CONTRAST/COMPLICATIONS:  IV Contrast: Omnipaque 350 (accession [id number]), IV contrast documented   in unlinked concurrent exam (accession [id number])  90 cc administered  10   cc discarded  Oral Contrast: NONE  Complications: None reported at time of study completion    PROCEDURE:  CT Angiography of the Chest was performed followed by portal venous phase   imaging of the Abdomen and Pelvis.  Sagittal and coronal reformats were performed as well as 3D (MIP)   reconstructions.    FINDINGS:  CHEST:  LUNGS PLEURA AND LARGE AIRWAYS: Patent central airways.  Moderate right pleural effusion and small left pleural effusion with   associated atelectasis. Diffuse airspaceopacities throughout the   bilateral lungs. Differential includes both infection and/or edema.   Correlate clinically. Recommend short-term follow-up to ensure resolution.    VESSELS: No pulmonary embolism.  HEART: Cardiomegaly. Coronary artery calcifications. No pericardial   effusion.  MEDIASTINUM AND EVA: No lymphadenopathy.  CHEST WALL AND LOWER NECK: Within normal limits.    ABDOMEN AND PELVIS:  LIVER: A few small hypodensities too small to characterize.  BILE DUCTS: Normal caliber.  GALLBLADDER: Within normal limits.  SPLEEN: Within normal limits.  PANCREAS: Within normal limits.  ADRENALS: Within normal limits.  KIDNEYS/URETERS: Small right renal cyst.    BLADDER: Within normal limits.  REPRODUCTIVE ORGANS: 3.2 cm right adnexal cyst. Recommend nonemergent   pelvic ultrasound.    BOWEL: Moderate amount of stool in rectum. No rectal wall thickening.   Scattered small colonic diverticula without diverticulitis. No bowel   obstruction. Appendix is normal.  PERITONEUM: No ascites.  VESSELS: Atherosclerotic changes.  RETROPERITONEUM/LYMPH NODES: No lymphadenopathy.  ABDOMINAL WALL: Within normal limits.  BONES: Degenerative changes. Cirrhosis.    IMPRESSION:    No pulmonary embolism.    Moderate right pleural effusion and small leftpleural effusion with   associated atelectasis. Diffuse airspace opacities throughout the   bilateral lungs. Differential includes both infection and/or edema.   Correlate clinically. Recommend short-term follow-up to ensure resolution.    3.2 cm right adnexal cyst. Recommend nonemergent pelvic ultrasound.      --- End of Report ---    REESE TODD MD; Attending Radiologist  This document has been electronically signed. Oct 16 2023  6:39PM    < end of copied text >        CRITICAL CARE TIME SPENT: 109 minutes assessing presenting problems of acute illness, which pose high probability of life threatening deterioration or end organ damage/dysfunction, as well as medical decision making including initiating plan of care, reviewing data, reviewing radiologic exams, discussing with multidisciplinary team,  discussing goals of care with patient/family, and writing this note.  Non-inclusive of procedures performed,

## 2023-10-16 NOTE — ED PROVIDER NOTE - CARE PLAN
1 Principal Discharge DX:	Acute pulmonary edema  Secondary Diagnosis:	Pneumonia  Secondary Diagnosis:	Pleural effusion

## 2023-10-16 NOTE — CONSULT NOTE ADULT - SUBJECTIVE AND OBJECTIVE BOX
CHIEF COMPLAINT/ REASON FOR VISIT  .. Patient was seen to address the  issue listed under PROBLEM LIST which is located toward bottom of this note     JOSE ROY    [facility] AP[facility]    Allergies    Allergy Status Unknown    Intolerances        PAST MEDICAL & SURGICAL HISTORY:      FAMILY HISTORY:      Home Medications:      MEDICATIONS  (STANDING):    MEDICATIONS  (PRN):              Vital Signs Last 24 Hrs  T(C): 35.9 (16 Oct 2023 17:30), Max: 35.9 (16 Oct 2023 17:30)  T(F): 96.6 (16 Oct 2023 17:30), Max: 96.6 (16 Oct 2023 17:30)  HR: 69 (16 Oct 2023 17:30) (69 - 71)  BP: 111/57 (16 Oct 2023 17:30) (111/57 - 111/57)  BP(mean): --  RR: 1 (16 Oct 2023 17:30) (1 - 1)  SpO2: 99% (16 Oct 2023 17:30) (93% - 99%)    Parameters below as of 16 Oct 2023 17:30  Patient On (Oxygen Delivery Method): BiPAP/CPAP  O2 Flow (L/min): 12                LABS:                        12.8   11.15 )-----------( 298      ( 16 Oct 2023 17:05 )             42.1     10-16    137  |  100  |  17  ----------------------------<  219<H>  4.3   |  33<H>  |  0.95    Ca    9.0      16 Oct 2023 17:05  Mg     2.3     10-16    TPro  8.2  /  Alb  3.4  /  TBili  0.4  /  DBili  x   /  AST  25  /  ALT  18  /  AlkPhos  111  10-16    PT/INR - ( 16 Oct 2023 17:05 )   PT: 11.0 sec;   INR: 0.94 ratio         PTT - ( 16 Oct 2023 17:05 )  PTT:32.3 sec  Urinalysis Basic - ( 16 Oct 2023 20:43 )    Color: Yellow / Appearance: Clear / S.026 / pH: x  Gluc: x / Ketone: Negative mg/dL  / Bili: Negative / Urobili: 0.2 mg/dL   Blood: x / Protein: Trace mg/dL / Nitrite: Negative   Leuk Esterase: Small / RBC: 2 /HPF / WBC 8 /HPF   Sq Epi: x / Non Sq Epi: x / Bacteria: Few /HPF        ABG - ( 16 Oct 2023 20:38 )  pH, Arterial: 7.34  pH, Blood: x     /  pCO2: 58    /  pO2: 232   / HCO3: 31    / Base Excess: 5.5   /  SaO2: 100.0               WBC:  WBC Count: 11.15 K/uL (10-16 @ 17:05)      MICROBIOLOGY:  RECENT CULTURES:              PT/INR - ( 16 Oct 2023 17:05 )   PT: 11.0 sec;   INR: 0.94 ratio         PTT - ( 16 Oct 2023 17:05 )  PTT:32.3 sec    Sodium:  Sodium: 137 mmol/L (10-16 @ 17:05)      0.95 mg/dL 10-16 @ 17:05      Hemoglobin:  Hemoglobin: 12.8 g/dL (10-16 @ 17:05)      Platelets: Platelet Count - Automated: 298 K/uL (10-16 @ 17:05)      LIVER FUNCTIONS - ( 16 Oct 2023 17:05 )  Alb: 3.4 g/dL / Pro: 8.2 g/dL / ALK PHOS: 111 U/L / ALT: 18 U/L / AST: 25 U/L / GGT: x             Urinalysis Basic - ( 16 Oct 2023 20:43 )    Color: Yellow / Appearance: Clear / S.026 / pH: x  Gluc: x / Ketone: Negative mg/dL  / Bili: Negative / Urobili: 0.2 mg/dL   Blood: x / Protein: Trace mg/dL / Nitrite: Negative   Leuk Esterase: Small / RBC: 2 /HPF / WBC 8 /HPF   Sq Epi: x / Non Sq Epi: x / Bacteria: Few /HPF        RADIOLOGY & ADDITIONAL STUDIES:      MICROBIOLOGY:  RECENT CULTURES:

## 2023-10-16 NOTE — ED PROVIDER NOTE - NSICDXPASTMEDICALHX_GEN_ALL_CORE_FT
PAST MEDICAL HISTORY:  2019 novel coronavirus disease (COVID-19)     Acute UTI     Cataract     Cognitive communication deficit     CVA (cerebrovascular accident)     Difficulty in walking     Diverticulosis of intestine     GERD (gastroesophageal reflux disease)     H/O gastroenteritis     History of colitis     HLD (hyperlipidemia)     HTN (hypertension)     Hypothyroidism     Insomnia     Leg edema     MDD (major depressive disorder)     Muscle weakness     Neuralgia and neuritis     Pain in right knee     Urinary incontinence     Urinary tract infection

## 2023-10-16 NOTE — ED PROVIDER NOTE - CLINICAL SUMMARY MEDICAL DECISION MAKING FREE TEXT BOX
Denver Goodrich MD (Attending Physician): Denver Goodrich MD (Attending Physician): The patient is a 89y female with pmhx of HTN, HLD, CVA, depression, GERD presenting from [facility] with increased SOB. DDx includes, but not limited to: CHF exacerbation, COPD exacerbation, PE, PNA, ACS, PTX, anemia. ekg, cxr, CTA chest, CT a/p, labs, lasix, duoneb, bipap, ICU consult. Pt is DNR/DNI. Will require admission.

## 2023-10-17 NOTE — CARE COORDINATION ASSESSMENT. - OTHER PERTINENT DISCHARGE PLANNING INFORMATION:
Discussed today at rounds. Pt. from [facility] SN[facility]. Pt. appears alert and oriented but currently on bipap so some difficulty understanding her. She said she has been in nursing home for about 3 years. Educated her re. role of social work & provided d/c planning folder including social work name & #. Spoke with daughter Malathi by phone. She confirmed patient resides at [facility] but has not seen her in over 1 year due to her own medical issues. She is agreeable to return upon d/c. [facility] contacted and confirmed she is a long term resident, no auth needed to return. States she needs total assist with ADLs & ambulated minimally with assist. Social work to follow & assist at time of d/c.  Discussed today at rounds. Pt. from [facility]. Pt. appears alert and oriented but currently on bipap so some difficulty understanding her. She said she has been in nursing home for about 3 years. Educated her re. role of social work & provided d/c planning folder including social work name & #. Declined to identify caregiver but agreed I could speak with daughter. Spoke with daughter Malathi by phone. She confirmed patient resides at [facility] but has not seen her in over 1 year due to her own medical issues. She is agreeable to return upon d/c. [facility] contacted and confirmed she is a long term resident, no auth needed to return. States she needs total assist with ADLs & ambulated minimally with assist. Social work to follow & assist at time of d/c.

## 2023-10-17 NOTE — CONSULT NOTE ADULT - SUBJECTIVE AND OBJECTIVE BOX
[facility], Division of Infectious Diseases  MARTINEZ William S. Shah, Y. Patel, [facility]. [facility]  [phone]    JOSE ROY  89y, Female  [id number]    HPI--  HPI:  89 y/o Female with PMH of HTN, HLD, CVA (w/ Residual ), Depression, GERD presents to  ED BIBA from SNF complaining of shortness of breath ,diagnosed with   1. Acute Hypoxic Respiratory Failure with hypoxia and hypercapnia   2. Acute Pulmonary Edema   3. Pleural Effusions  4. COPD exacerbation Admitted  to telemetry unit for monitoring , send 3 sets of cardiac enzymes to rule out acute coronary event, obtain ECHO to evaluate LVEF, cardiology consult  ,continue current management, O2 supply, anticoagulation plan as per cardiology consult Seen by ICU in ER -didnt require icu admission , seen by pulm /intensivnist and cardiology on admission  -On arrival to ED, patient in significant respiratory distress, hypoxic to 70s. Placed on NC with minimal improvement and work of breathing. Transitioned to NIPPV with significant improvement in oxygenation. CXR on admission with B/L hazy infiltrates, likely component of acute pulmonary edema. Recommend diuresis with Lasix. Order TTE, with Cardiology follow up in AM. Clinically with significant improvement in respiratory status. Palliative care consult requested ,to discuss advance directives and complete MOLST -DNR,DNI ,NO PEG ,LIMITED MED INTERVENTIONS   -Given profound hypoxia, concern for possible PE. Ddimer negative, with absent tachycardia and hypotension. Low likelihood. CT Angio ordered, with No pulmonary embolism., Moderate right pleural effusion and small left pleural effusion with associated atelectasis. Diffuse airspace opacities throughout the bilateral lungs. US Duplex B/L LE pending.   -No overt s/s of underlying cardiac event. Patient does not endorse chest pain/ pressure. Troponin negative x1, repeat pending. EKG on admission with no acute pathology.   -Respiratory status likely multifactorial in setting of acute pulmonary edema v pneumonia. CT chest with diffuse opacities in B/L lungs, concerning for underlying infection. s/p Vancomycin and Zosyn in ED. However, given possibility of superimposed infection would continue empiric coverage with Zosyn. Recommend BCx, SCx, UA, UCx, Legionella and Strep Pneumo.  (17 Oct 2023 06:48)    ID c/s for further evaluation    Active Medications--  acetaminophen     Tablet .. 650 milliGRAM(s) Oral every 6 hours PRN  albuterol/ipratropium for Nebulization 3 milliLiter(s) Nebulizer every 6 hours  aluminum hydroxide/magnesium hydroxide/simethicone Suspension 30 milliLiter(s) Oral every 4 hours PRN  dextrose 5% + sodium chloride 0.45%. 1000 milliLiter(s) IV Continuous <Continuous>  enoxaparin Injectable 30 milliGRAM(s) SubCutaneous every 24 hours  lactobacillus acidophilus 1 Tablet(s) Oral every 12 hours  melatonin 3 milliGRAM(s) Oral at bedtime PRN  methylPREDNISolone sodium succinate Injectable 40 milliGRAM(s) IV Push daily  midodrine. 5 milliGRAM(s) Oral three times a day PRN  ondansetron Injectable 4 milliGRAM(s) IV Push every 8 hours PRN  pantoprazole  Injectable 40 milliGRAM(s) IV Push daily  piperacillin/tazobactam IVPB.. 3.375 Gram(s) IV Intermittent every 8 hours    Antimicrobials:   piperacillin/tazobactam IVPB.. 3.375 Gram(s) IV Intermittent every 8 hours    Immunologic:     ROS:  CONSTITUTIONAL: No fevers or chills. No weakness or headache. No weight changes.  EYES/ENT: No visual or hearing changes. No sore throat or throat pain .  NECK: No pain or stiffness  RESPIRATORY: No cough, wheezing, or hemoptysis. No shortness of breath  CARDIOVASCULAR: No chest pain or palpitations  GASTROINTESTINAL: No abdominal pain. No nausea or vomiting. No diarrhea or constipation.  GENITOURINARY: No dysuria, frequency or hematuria  NEUROLOGICAL: No numbness or weakness  SKIN: No itching or rashes  PSYCHIATRIC: Pleasant. Appropriate affect    Allergies: No Known Allergies    PMH -- GERD (gastroesophageal reflux disease)    CVA (cerebrovascular accident)    HTN (hypertension)    HLD (hyperlipidemia)    Cognitive communication deficit    Difficulty in walking    Diverticulosis of intestine    Leg edema    Hypothyroidism    Insomnia    MDD (major depressive disorder)    Muscle weakness    Neuralgia and neuritis    Cataract    Pain in right knee    Acute UTI    Urinary tract infection    Urinary incontinence    2019 novel coronavirus disease (COVID-19)    H/O gastroenteritis    History of colitis      PSH --   FH --   Social History --  EtOH: denies   Tobacco: denies   Drug Use: denies     Travel/Environmental/Occupational History:    Physical Exam--  Vital Signs Last 24 Hrs  T(F): 96.6 (16 Oct 2023 17:30), Max: 96.6 (16 Oct 2023 17:30)  HR: 69 (17 Oct 2023 07:38) (60 - 103)  BP: 117/63 (17 Oct 2023 07:12) (88/51 - 117/63)  RR: 20 (17 Oct 2023 07:12) (1 - 20)  SpO2: 99% (17 Oct 2023 07:38) (93% - 100%)  General: nontoxic-appearing, no acute distress  HEENT: NC/AT, EOMI, anicteric  Lungs: Clear bilaterally without rales, wheezing or rhonchi  Heart: Regular rate and rhythm. No murmur, rub or gallop.  Abdomen: Soft. Nondistended. Nontender.   Extremities: No cyanosis or clubbing. No edema.   Skin: Warm. Dry. Good turgor. No rash.    Laboratory & Imaging Data:  CBC:                       10.3   5.14  )-----------( 210      ( 17 Oct 2023 05:43 )             33.1     CMP: 10-17    138  |  102  |  22  ----------------------------<  177<H>  4.3   |  30  |  0.98    Ca    8.2<L>      17 Oct 2023 05:43  Mg     2.3     10-16    TPro  6.6  /  Alb  2.6<L>  /  TBili  0.3  /  DBili  x   /  AST  21  /  ALT  17  /  AlkPhos  81  10-17    LIVER FUNCTIONS - ( 17 Oct 2023 05:43 )  Alb: 2.6 g/dL / Pro: 6.6 g/dL / ALK PHOS: 81 U/L / ALT: 17 U/L / AST: 21 U/L / GGT: x           Urinalysis Basic - ( 17 Oct 2023 05:43 )    Color: x / Appearance: x / SG: x / pH: x  Gluc: 177 mg/dL / Ketone: x  / Bili: x / Urobili: x   Blood: x / Protein: x / Nitrite: x   Leuk Esterase: x / RBC: x / WBC x   Sq Epi: x / Non Sq Epi: x / Bacteria: x        Microbiology: reviewed        Radiology: reviewed    < from: US Duplex Venous Lower Ext Complete, Bilateral (10.16.23 @ 19:55) >    ACC: [id number] EXAM:  US DPLX LWR EXT VEINS COMPL BI   ORDERED BY:  SHRUTHI IRAHETA     PROCEDURE DATE:  10/16/2023          INTERPRETATION:  CLINICAL INFORMATION: Edema.    COMPARISON: None available.    TECHNIQUE: Duplex sonography of the BILATERALLOWER extremity veins with   color and spectral Doppler, with and without compression.    FINDINGS:    RIGHT:  Normal compressibility of the RIGHT common femoral, femoral and popliteal   veins.  Doppler examination shows normal spontaneous and phasicflow.  No RIGHT calf vein thrombosis is detected.    LEFT:  Normal compressibility of the LEFT common femoral, femoral and popliteal   veins.  Doppler examination shows normal spontaneous and phasic flow.  No LEFT calf vein thrombosis is detected.    IMPRESSION:  No evidence of deep venous thrombosis in either lower extremity.            --- End of Report ---            OH SOLORZANO MD; Attending Radiologist  This document has been electronically signed. Oct 16 2023  7:58PM    < end of copied text >  < from: CT Abdomen and Pelvis w/ IV Cont (10.16.23 @ 18:23) >    ACC: [id number] EXAM:  CT ANGIO CHEST PULM ART WAWIC   ORDERED BY:  SHRUTHI IRAHETA     ACC: [id number] EXAM:  CT ABDOMEN AND PELVIS IC   ORDERED BY:  SHRUTHISLIM IRAHETA     PROCEDURE DATE:  10/16/2023          INTERPRETATION:  CLINICAL INFORMATION: Respiratory failure. Edema.   Pneumonia. Question pulmonary embolism    COMPARISON: None.    CONTRAST/COMPLICATIONS:  IV Contrast: Omnipaque 350 (accession [id number]), IV contrast documented   in unlinked concurrent exam (accession [id number])  90 cc administered  10   cc discarded  Oral Contrast: NONE  Complications: None reported at time of study completion    PROCEDURE:  CT Angiography of the Chest was performed followed by portal venous phase   imaging of the Abdomen and Pelvis.  Sagittal and coronal reformats were performed as well as 3D (MIP)   reconstructions.    FINDINGS:  CHEST:  LUNGS PLEURA AND LARGE AIRWAYS: Patent central airways.  Moderate right pleural effusion and small left pleural effusion with   associated atelectasis. Diffuse airspaceopacities throughout the   bilateral lungs. Differential includes both infection and/or edema.   Correlate clinically. Recommend short-term follow-up to ensure resolution.    VESSELS: No pulmonary embolism.  HEART: Cardiomegaly. Coronary artery calcifications. No pericardial   effusion.  MEDIASTINUM AND EVA: No lymphadenopathy.  CHEST WALL AND LOWER NECK: Within normal limits.    ABDOMEN AND PELVIS:  LIVER: A few small hypodensities too small to characterize.  BILE DUCTS: Normal caliber.  GALLBLADDER: Within normal limits.  SPLEEN: Within normal limits.  PANCREAS: Within normal limits.  ADRENALS: Within normal limits.  KIDNEYS/URETERS: Small right renal cyst.    BLADDER: Within normal limits.  REPRODUCTIVE ORGANS: 3.2 cm right adnexal cyst. Recommend nonemergent   pelvic ultrasound.    BOWEL: Moderate amount of stool in rectum. No rectal wall thickening.   Scattered small colonic diverticula without diverticulitis. No bowel   obstruction. Appendix is normal.  PERITONEUM: No ascites.  VESSELS: Atherosclerotic changes.  RETROPERITONEUM/LYMPH NODES: No lymphadenopathy.  ABDOMINAL WALL: Within normal limits.  BONES: Degenerative changes. Cirrhosis.    IMPRESSION:    No pulmonary embolism.    Moderate right pleural effusion and small leftpleural effusion with   associated atelectasis. Diffuse airspace opacities throughout the   bilateral lungs. Differential includes both infection and/or edema.   Correlate clinically. Recommend short-term follow-up to ensure resolution.    3.2 cm right adnexal cyst. Recommend nonemergent pelvic ultrasound.        --- End of Report ---            REESE TODD MD; Attending Radiologist  This document has been electronically signed. Oct 16 2023  6:39PM    < end of copied text >   [facility], Division of Infectious Diseases  MARTINEZ William S. Shah, Y. Patel, G. [facility]  [phone]    JOSE ROY  89y, Female  [id number]    HPI--  HPI:  89 y/o Female with PMH of HTN, HLD, CVA (w/ Residual ), Depression, GERD presents to  ED BIBA from SNF complaining of shortness of breath.  On arrival to ED, patient in significant respiratory distress, hypoxic to 70s. Placed on NC with minimal improvement and work of breathing. Transitioned to NIPPV with significant improvement in oxygenation. CXR on admission with B/L hazy infiltrates, likely component of acute pulmonary edema.   Given profound hypoxia, concern for possible PE. Ddimer negative, with absent tachycardia and hypotension. Low likelihood. CT Angio ordered, with No pulmonary embolism., Moderate right pleural effusion and small left pleural effusion with associated atelectasis. Diffuse airspace opacities throughout the bilateral lungs. US Duplex B/L LE pending.    (17 Oct 2023 06:48)    ID c/s for further evaluation  Pt seen at bedside   On BPAP, comfortable    Active Medications--  acetaminophen     Tablet .. 650 milliGRAM(s) Oral every 6 hours PRN  albuterol/ipratropium for Nebulization 3 milliLiter(s) Nebulizer every 6 hours  aluminum hydroxide/magnesium hydroxide/simethicone Suspension 30 milliLiter(s) Oral every 4 hours PRN  dextrose 5% + sodium chloride 0.45%. 1000 milliLiter(s) IV Continuous <Continuous>  enoxaparin Injectable 30 milliGRAM(s) SubCutaneous every 24 hours  lactobacillus acidophilus 1 Tablet(s) Oral every 12 hours  melatonin 3 milliGRAM(s) Oral at bedtime PRN  methylPREDNISolone sodium succinate Injectable 40 milliGRAM(s) IV Push daily  midodrine. 5 milliGRAM(s) Oral three times a day PRN  ondansetron Injectable 4 milliGRAM(s) IV Push every 8 hours PRN  pantoprazole  Injectable 40 milliGRAM(s) IV Push daily  piperacillin/tazobactam IVPB.. 3.375 Gram(s) IV Intermittent every 8 hours    Antimicrobials:   piperacillin/tazobactam IVPB.. 3.375 Gram(s) IV Intermittent every 8 hours    Immunologic:     ROS:  unable to obtain    Allergies: No Known Allergies    PMH -- GERD (gastroesophageal reflux disease)    CVA (cerebrovascular accident)    HTN (hypertension)    HLD (hyperlipidemia)    Cognitive communication deficit    Difficulty in walking    Diverticulosis of intestine    Leg edema    Hypothyroidism    Insomnia    MDD (major depressive disorder)    Muscle weakness    Neuralgia and neuritis    Cataract    Pain in right knee    Acute UTI    Urinary tract infection    Urinary incontinence    2019 novel coronavirus disease (COVID-19)    H/O gastroenteritis    History of colitis      PSH --   FH --   Social History --  EtOH: denies   Tobacco: denies   Drug Use: denies     Travel/Environmental/Occupational History:    Physical Exam--  Vital Signs Last 24 Hrs  T(F): 96.6 (16 Oct 2023 17:30), Max: 96.6 (16 Oct 2023 17:30)  HR: 69 (17 Oct 2023 07:38) (60 - 103)  BP: 117/63 (17 Oct 2023 07:12) (88/51 - 117/63)  RR: 20 (17 Oct 2023 07:12) (1 - 20)  SpO2: 99% (17 Oct 2023 07:38) (93% - 100%)  General: nontoxic-appearing, no acute distress  HEENT: NC/AT, EOMI, anicteric  Lungs: on BPAP  Heart: Regular rate and rhythm. No murmur, rub or gallop.  Abdomen: Soft. Nondistended. Nontender.   Extremities: No cyanosis or clubbing. No edema.   Skin: Warm. Dry. Good turgor. No rash.    Laboratory & Imaging Data:  CBC:                       10.3   5.14  )-----------( 210      ( 17 Oct 2023 05:43 )             33.1     CMP: 10-17    138  |  102  |  22  ----------------------------<  177<H>  4.3   |  30  |  0.98    Ca    8.2<L>      17 Oct 2023 05:43  Mg     2.3     10-16    TPro  6.6  /  Alb  2.6<L>  /  TBili  0.3  /  DBili  x   /  AST  21  /  ALT  17  /  AlkPhos  81  10-17    LIVER FUNCTIONS - ( 17 Oct 2023 05:43 )  Alb: 2.6 g/dL / Pro: 6.6 g/dL / ALK PHOS: 81 U/L / ALT: 17 U/L / AST: 21 U/L / GGT: x           Urinalysis Basic - ( 17 Oct 2023 05:43 )    Color: x / Appearance: x / SG: x / pH: x  Gluc: 177 mg/dL / Ketone: x  / Bili: x / Urobili: x   Blood: x / Protein: x / Nitrite: x   Leuk Esterase: x / RBC: x / WBC x   Sq Epi: x / Non Sq Epi: x / Bacteria: x        Microbiology: reviewed        Radiology: reviewed    < from: US Duplex Venous Lower Ext Complete, Bilateral (10.16.23 @ 19:55) >    ACC: [id number] EXAM:  US DPLX LWR EXT VEINS COMPL BI   ORDERED BY:  SHRUTHI IRAHETA     PROCEDURE DATE:  10/16/2023          INTERPRETATION:  CLINICAL INFORMATION: Edema.    COMPARISON: None available.    TECHNIQUE: Duplex sonography of the BILATERALLOWER extremity veins with   color and spectral Doppler, with and without compression.    FINDINGS:    RIGHT:  Normal compressibility of the RIGHT common femoral, femoral and popliteal   veins.  Doppler examination shows normal spontaneous and phasicflow.  No RIGHT calf vein thrombosis is detected.    LEFT:  Normal compressibility of the LEFT common femoral, femoral and popliteal   veins.  Doppler examination shows normal spontaneous and phasic flow.  No LEFT calf vein thrombosis is detected.    IMPRESSION:  No evidence of deep venous thrombosis in either lower extremity.            --- End of Report ---            OH SOLORZANO MD; Attending Radiologist  This document has been electronically signed. Oct 16 2023  7:58PM    < end of copied text >  < from: CT Abdomen and Pelvis w/ IV Cont (10.16.23 @ 18:23) >    ACC: [id number] EXAM:  CT ANGIO CHEST PULM ART WAWIC   ORDERED BY:  SHRUTHI IRAHETA     ACC: [id number] EXAM:  CT ABDOMEN AND PELVIS IC   ORDERED BY:  SHRUTHI IRAHETA     PROCEDURE DATE:  10/16/2023          INTERPRETATION:  CLINICAL INFORMATION: Respiratory failure. Edema.   Pneumonia. Question pulmonary embolism    COMPARISON: None.    CONTRAST/COMPLICATIONS:  IV Contrast: Omnipaque 350 (accession [id number]), IV contrast documented   in unlinked concurrent exam (accession [id number])  90 cc administered  10   cc discarded  Oral Contrast: NONE  Complications: None reported at time of study completion    PROCEDURE:  CT Angiography of the Chest was performed followed by portal venous phase   imaging of the Abdomen and Pelvis.  Sagittal and coronal reformats were performed as well as 3D (MIP)   reconstructions.    FINDINGS:  CHEST:  LUNGS PLEURA AND LARGE AIRWAYS: Patent central airways.  Moderate right pleural effusion and small left pleural effusion with   associated atelectasis. Diffuse airspaceopacities throughout the   bilateral lungs. Differential includes both infection and/or edema.   Correlate clinically. Recommend short-term follow-up to ensure resolution.    VESSELS: No pulmonary embolism.  HEART: Cardiomegaly. Coronary artery calcifications. No pericardial   effusion.  MEDIASTINUM AND EVA: No lymphadenopathy.  CHEST WALL AND LOWER NECK: Within normal limits.    ABDOMEN AND PELVIS:  LIVER: A few small hypodensities too small to characterize.  BILE DUCTS: Normal caliber.  GALLBLADDER: Within normal limits.  SPLEEN: Within normal limits.  PANCREAS: Within normal limits.  ADRENALS: Within normal limits.  KIDNEYS/URETERS: Small right renal cyst.    BLADDER: Within normal limits.  REPRODUCTIVE ORGANS: 3.2 cm right adnexal cyst. Recommend nonemergent   pelvic ultrasound.    BOWEL: Moderate amount of stool in rectum. No rectal wall thickening.   Scattered small colonic diverticula without diverticulitis. No bowel   obstruction. Appendix is normal.  PERITONEUM: No ascites.  VESSELS: Atherosclerotic changes.  RETROPERITONEUM/LYMPH NODES: No lymphadenopathy.  ABDOMINAL WALL: Within normal limits.  BONES: Degenerative changes. Cirrhosis.    IMPRESSION:    No pulmonary embolism.    Moderate right pleural effusion and small leftpleural effusion with   associated atelectasis. Diffuse airspace opacities throughout the   bilateral lungs. Differential includes both infection and/or edema.   Correlate clinically. Recommend short-term follow-up to ensure resolution.    3.2 cm right adnexal cyst. Recommend nonemergent pelvic ultrasound.        --- End of Report ---            REESE TODD MD; Attending Radiologist  This document has been electronically signed. Oct 16 2023  6:39PM    < end of copied text >

## 2023-10-17 NOTE — H&P ADULT - TIME BILLING
75minutes spent on this visit, 50% visit time spent in care co-ordination with other attendings and counselling patient ,writing admission orders ( see complete and current orders and order section) ,requesting necessary consults ,informing family about status & plan of care .I have discussed care plan with [facility] wellness/admitting /nursing   department ,outpatient PCP , hospital consultants , ER physician & med staff .

## 2023-10-17 NOTE — CONSULT NOTE ADULT - SUBJECTIVE AND OBJECTIVE BOX
Date/Time Patient Seen:  		  Referring MD:   Data Reviewed	       Patient is a 89y old  Female who presents with a chief complaint of SOB (16 Oct 2023 22:48)      Subjective/HPI   89 yo F with acute mixed resp. failure due to suspected BL PNA, contribution of pulmonary edema with BL pl. effusi  PAST MEDICAL & SURGICAL HISTORY:        Medication list         MEDICATIONS  (STANDING):  albuterol/ipratropium for Nebulization 3 milliLiter(s) Nebulizer every 6 hours  dextrose 5% + sodium chloride 0.45% with potassium chloride 20 mEq/L 1000 milliLiter(s) (50 mL/Hr) IV Continuous <Continuous>  enoxaparin Injectable 30 milliGRAM(s) SubCutaneous every 24 hours  methylPREDNISolone sodium succinate Injectable 40 milliGRAM(s) IV Push daily  pantoprazole  Injectable 40 milliGRAM(s) IV Push daily  piperacillin/tazobactam IVPB.. 3.375 Gram(s) IV Intermittent every 8 hours    MEDICATIONS  (PRN):  midodrine. 5 milliGRAM(s) Oral three times a day PRN for systolic BP<100         Vitals log        ICU Vital Signs Last 24 Hrs  T(C): 35.9 (16 Oct 2023 17:30), Max: 35.9 (16 Oct 2023 17:30)  T(F): 96.6 (16 Oct 2023 17:30), Max: 96.6 (16 Oct 2023 17:30)  HR: 67 (17 Oct 2023 00:30) (67 - 103)  BP: 99/53 (17 Oct 2023 03:26) (88/51 - 111/57)  BP(mean): --  ABP: --  ABP(mean): --  RR: 18 (16 Oct 2023 23:45) (1 - 18)  SpO2: 100% (17 Oct 2023 00:30) (93% - 100%)    O2 Parameters below as of 17 Oct 2023 00:30  Patient On (Oxygen Delivery Method): BiPAP/CPAP                 Input and Output:  I&O's Detail      Lab Data                        12.8   11.15 )-----------( 298      ( 16 Oct 2023 17:05 )             42.1     10-16    137  |  100  |  17  ----------------------------<  219<H>  4.3   |  33<H>  |  0.95    Ca    9.0      16 Oct 2023 17:05  Mg     2.3     10-16    TPro  8.2  /  Alb  3.4  /  TBili  0.4  /  DBili  x   /  AST  25  /  ALT  18  /  AlkPhos  111  10-16    ABG - ( 16 Oct 2023 20:38 )  pH, Arterial: 7.34  pH, Blood: x     /  pCO2: 58    /  pO2: 232   / HCO3: 31    / Base Excess: 5.5   /  SaO2: 100.0                   Review of Systems	      Objective     Physical Examination        Pertinent Lab findings & Imaging      Al:  NO   Adequate UO     I&O's Detail           Discussed with:     Cultures:	        Radiology    ACC: [id number] EXAM:  CT ANGIO CHEST PULM ART WAWIC   ORDERED BY:  SHRUTHI IRAHETA     ACC: [id number] EXAM:  CT ABDOMEN AND PELVIS IC   ORDERED BY:  SHRUTHI IRAHETA     PROCEDURE DATE:  10/16/2023          INTERPRETATION:  CLINICAL INFORMATION: Respiratory failure. Edema.   Pneumonia. Question pulmonary embolism    COMPARISON: None.    CONTRAST/COMPLICATIONS:  IV Contrast: Omnipaque 350 (accession [id number]), IV contrast documented   in unlinked concurrent exam (accession [id number])  90 cc administered   10   cc discarded  Oral Contrast: NONE  Complications: None reported at time of study completion    PROCEDURE:  CT Angiography of the Chest was performed followed by portal venous phase   imaging of the Abdomen and Pelvis.  Sagittal and coronal reformats were performed as well as 3D (MIP)   reconstructions.    FINDINGS:  CHEST:  LUNGS PLEURA AND LARGE AIRWAYS: Patent central airways.  Moderate right pleural effusion and small left pleural effusion with   associated atelectasis. Diffuse airspace opacities throughout the   bilateral lungs. Differential includes both infection and/or edema.   Correlate clinically. Recommend short-term follow-up to ensure resolution.    VESSELS: No pulmonary embolism.  HEART: Cardiomegaly. Coronary artery calcifications. No pericardial   effusion.  MEDIASTINUM AND EVA: No lymphadenopathy.  CHEST WALL AND LOWER NECK: Within normal limits.    ABDOMEN AND PELVIS:  LIVER: A few small hypodensities too small to characterize.  BILE DUCTS: Normal caliber.  GALLBLADDER: Within normal limits.  SPLEEN: Within normal limits.  PANCREAS: Within normal limits.  ADRENALS: Within normal limits.  KIDNEYS/URETERS: Small right renal cyst.    BLADDER: Within normal limits.  REPRODUCTIVE ORGANS: 3.2 cm right adnexal cyst. Recommend nonemergent   pelvic ultrasound.    BOWEL: Moderate amount of stool in rectum. No rectal wall thickening.   Scattered small colonic diverticula without diverticulitis. No bowel   obstruction. Appendix is normal.  PERITONEUM: No ascites.  VESSELS: Atherosclerotic changes.  RETROPERITONEUM/LYMPH NODES: No lymphadenopathy.  ABDOMINAL WALL: Within normal limits.  BONES: Degenerative changes. Cirrhosis.    IMPRESSION:    No pulmonary embolism.    Moderate right pleural effusion and small left pleural effusion with   associated atelectasis. Diffuse airspace opacities throughout the   bilateral lungs. Differential includes both infection and/or edema.   Correlate clinically. Recommend short-term follow-up to ensure resolution.    3.2 cm right adnexal cyst. Recommend nonemergent pelvic ultrasound.        --- End of Report ---            REESE TODD MD; Attending Radiologist

## 2023-10-17 NOTE — H&P ADULT - PROBLEM/PLAN-5
Have Your Skin Lesions Been Treated?: not been treated Is This A New Presentation, Or A Follow-Up?: Skin Lesion How Severe Is Your Skin Lesion?: mild DISPLAY PLAN FREE TEXT

## 2023-10-17 NOTE — CONSULT NOTE ADULT - ASSESSMENT
Full consult to follow    D/w Dr. Ferrer  Infectious Diseases will continue to follow. Please call with any questions.   Soumya Cleveland M.D.  [facility] Division of Infectious Diseases [phone]  For after 5 P.M. and weekends, please call [phone]   89 y/o Female with PMH of HTN, HLD, CVA (w/ Residual ), Depression, GERD presents to  ED BIBA from SNF complaining of shortness of breath.  On arrival to ED, patient in significant respiratory distress, hypoxic to 70s. Placed on NC with minimal improvement and work of breathing. Transitioned to NIPPV with significant improvement in oxygenation.   CXR on admission with B/L hazy infiltrates, likely component of acute pulmonary edema.Given profound hypoxia, concern for possible PE. Ddimer negative, with absent tachycardia and hypotension. Low likelihood. CT Angio ordered, with No pulmonary embolism., Moderate right pleural effusion and small left pleural effusion with associated atelectasis. Diffuse airspace opacities throughout the bilateral lungs.   ID c/s for possible PNA    Acute PNA  COPD Exacerbation  AHRF   Pleural Effusions  - pt p/w acute resp distress, now on BPAP  - imaging reviewed. CT negative for PE w/ R/L sided pleural effusion and diffuse airspace opacities throughout the bilateral lungs.  Recommendations:   C/w zosyn  F/u pending cx  F/u uPNA Ag  Trend temps/WBC  Maintain aspiration precautions  Diuresis, supplemental O2 and additional management per primary team    D/w Dr. Ferrer  Infectious Diseases will continue to follow. Please call with any questions.   Soumya Cleveland M.D.  [facility] Division of Infectious Diseases [phone]  For after 5 P.M. and weekends, please call [phone]

## 2023-10-17 NOTE — PROGRESS NOTE ADULT - SUBJECTIVE AND OBJECTIVE BOX
CHIEF COMPLAINT/ REASON FOR VISIT  .. Patient was seen to address the  issue listed under PROBLEM LIST which is located toward bottom of this note     JOSE ROY    PLV APER 24    Allergies    No Known Allergies    Intolerances        PAST MEDICAL & SURGICAL HISTORY:      FAMILY HISTORY:      Home Medications:      MEDICATIONS  (STANDING):  albuterol/ipratropium for Nebulization 3 milliLiter(s) Nebulizer every 6 hours  dextrose 5% + sodium chloride 0.45%. 1000 milliLiter(s) (30 mL/Hr) IV Continuous <Continuous>  enoxaparin Injectable 30 milliGRAM(s) SubCutaneous every 24 hours  methylPREDNISolone sodium succinate Injectable 40 milliGRAM(s) IV Push daily  pantoprazole  Injectable 40 milliGRAM(s) IV Push daily  piperacillin/tazobactam IVPB.. 3.375 Gram(s) IV Intermittent every 8 hours    MEDICATIONS  (PRN):  acetaminophen     Tablet .. 650 milliGRAM(s) Oral every 6 hours PRN Temp greater or equal to 38C (100.4F), Mild Pain (1 - 3)  aluminum hydroxide/magnesium hydroxide/simethicone Suspension 30 milliLiter(s) Oral every 4 hours PRN Dyspepsia  melatonin 3 milliGRAM(s) Oral at bedtime PRN Insomnia  midodrine. 5 milliGRAM(s) Oral three times a day PRN for systolic BP<100  ondansetron Injectable 4 milliGRAM(s) IV Push every 8 hours PRN Nausea and/or Vomiting      Diet, NPO:   Except Medications (10-17-23 @ 06:39) [Active]          Vital Signs Last 24 Hrs  T(C): 35.9 (16 Oct 2023 17:30), Max: 35.9 (16 Oct 2023 17:30)  T(F): 96.6 (16 Oct 2023 17:30), Max: 96.6 (16 Oct 2023 17:30)  HR: 60 (17 Oct 2023 05:39) (60 - 103)  BP: 99/53 (17 Oct 2023 03:26) (88/51 - 111/57)  BP(mean): --  RR: 18 (16 Oct 2023 23:45) (1 - 18)  SpO2: 100% (17 Oct 2023 05:39) (93% - 100%)    Parameters below as of 17 Oct 2023 00:30  Patient On (Oxygen Delivery Method): BiPAP/CPAP                  LABS:                        10.3   5.14  )-----------( 210      ( 17 Oct 2023 05:43 )             33.1     10-16    137  |  100  |  17  ----------------------------<  219<H>  4.3   |  33<H>  |  0.95    Ca    9.0      16 Oct 2023 17:05  Mg     2.3     10-16    TPro  8.2  /  Alb  3.4  /  TBili  0.4  /  DBili  x   /  AST  25  /  ALT  18  /  AlkPhos  111  10-16    PT/INR - ( 16 Oct 2023 17:05 )   PT: 11.0 sec;   INR: 0.94 ratio         PTT - ( 16 Oct 2023 17:05 )  PTT:32.3 sec  Urinalysis Basic - ( 16 Oct 2023 20:43 )    Color: Yellow / Appearance: Clear / S.026 / pH: x  Gluc: x / Ketone: Negative mg/dL  / Bili: Negative / Urobili: 0.2 mg/dL   Blood: x / Protein: Trace mg/dL / Nitrite: Negative   Leuk Esterase: Small / RBC: 2 /HPF / WBC 8 /HPF   Sq Epi: x / Non Sq Epi: x / Bacteria: Few /HPF        ABG - ( 17 Oct 2023 05:42 )  pH, Arterial: 7.33  pH, Blood: x     /  pCO2: 56    /  pO2: 124   / HCO3: 30    / Base Excess: 3.6   /  SaO2: 99.8                WBC:  WBC Count: 5.14 K/uL (10-17 @ 05:43)  WBC Count: 11.15 K/uL (10-16 @ 17:05)      MICROBIOLOGY:  RECENT CULTURES:              PT/INR - ( 16 Oct 2023 17:05 )   PT: 11.0 sec;   INR: 0.94 ratio         PTT - ( 16 Oct 2023 17:05 )  PTT:32.3 sec    Sodium:  Sodium: 137 mmol/L (10-16 @ 17:05)      0.95 mg/dL 10-16 @ 17:05      Hemoglobin:  Hemoglobin: 10.3 g/dL (10-17 @ 05:43)  Hemoglobin: 12.8 g/dL (10-16 @ 17:05)      Platelets: Platelet Count - Automated: 210 K/uL (10-17 @ 05:43)  Platelet Count - Automated: 298 K/uL (10-16 @ 17:05)      LIVER FUNCTIONS - ( 16 Oct 2023 17:05 )  Alb: 3.4 g/dL / Pro: 8.2 g/dL / ALK PHOS: 111 U/L / ALT: 18 U/L / AST: 25 U/L / GGT: x             Urinalysis Basic - ( 16 Oct 2023 20:43 )    Color: Yellow / Appearance: Clear / S.026 / pH: x  Gluc: x / Ketone: Negative mg/dL  / Bili: Negative / Urobili: 0.2 mg/dL   Blood: x / Protein: Trace mg/dL / Nitrite: Negative   Leuk Esterase: Small / RBC: 2 /HPF / WBC 8 /HPF   Sq Epi: x / Non Sq Epi: x / Bacteria: Few /HPF        RADIOLOGY & ADDITIONAL STUDIES:      MICROBIOLOGY:  RECENT CULTURES:

## 2023-10-17 NOTE — SWALLOW BEDSIDE ASSESSMENT ADULT - COMMENTS
As per H&P dated 10/17/23 "89 y/o Female with PMH of HTN, HLD, CVA (w/ Residual ), Depression, GERD presents to  ED BIBA from SNF complaining of shortness of breath"    CT Chest 10/16/23 "IMPRESSION:  No pulmonary embolism.  Moderate right pleural effusion and small left pleural effusion with associated atelectasis. Diffuse airspace opacities throughout the bilateral lungs. Differential includes both infection and/or edema.   Correlate clinically. Recommend short-term follow-up to ensure resolution.  3.2 cm right adnexal cyst. Recommend nonemergent pelvic ultrasound."    Clinical swallow evaluation ordered and attempted. Upon discussion with covering RN, patient on BiPAP requiring high oxygen demand. Therefore, oral and pharyngeal phases of the swallow could not be assessed at this time. Discussed with Dr. Ferrer. Medical team advised to reconsult this department as patient becomes medically optimized and achieves nasal cannula/room air status to assess for a safe oral feeding diet.

## 2023-10-17 NOTE — H&P ADULT - ASSESSMENT
89 y/o Female with PMH of HTN, HLD, CVA (w/ Residual ), Depression, GERD presents to  ED BIBA from SNF complaining of shortness of breath ,diagnosed with   1. Acute Hypoxic Respiratory Failure with hypoxia and hypercapnia   2. Acute Pulmonary Edema   3. Pleural Effusions  4. COPD exacerbation Admitted  to telemetry unit for monitoring , send 3 sets of cardiac enzymes to rule out acute coronary event, obtain ECHO to evaluate LVEF, cardiology consult  ,continue current management, O2 supply, anticoagulation plan as per cardiology consult Seen by ICU in ER -didnt require icu admission , seen by pulm /intensivnist and cardiology on admission  -On arrival to ED, patient in significant respiratory distress, hypoxic to 70s. Placed on NC with minimal improvement and work of breathing. Transitioned to NIPPV with significant improvement in oxygenation. CXR on admission with B/L hazy infiltrates, likely component of acute pulmonary edema. Recommend diuresis with Lasix. Order TTE, with Cardiology follow up in AM. Clinically with significant improvement in respiratory status. Palliative care consult requested ,to discuss advance directives and complete MOLST -DNR,DNI ,NO PEG ,LIMITED MED INTERVENTIONS   -Given profound hypoxia, concern for possible PE. Ddimer negative, with absent tachycardia and hypotension. Low likelihood. CT Angio ordered, with No pulmonary embolism., Moderate right pleural effusion and small left pleural effusion with associated atelectasis. Diffuse airspace opacities throughout the bilateral lungs. US Duplex B/L LE pending.   -No overt s/s of underlying cardiac event. Patient does not endorse chest pain/ pressure. Troponin negative x1, repeat pending. EKG on admission with no acute pathology.   -Respiratory status likely multifactorial in setting of acute pulmonary edema v pneumonia. CT chest with diffuse opacities in B/L lungs, concerning for underlying infection. s/p Vancomycin and Zosyn in ED. However, given possibility of superimposed infection would continue empiric coverage with Zosyn. Recommend BCx, SCx, UA, UCx, Legionella and Strep Pneumo.

## 2023-10-17 NOTE — CARE COORDINATION ASSESSMENT. - NSPASTMEDSURGHISTORY_GEN_ALL_CORE_FT
PAST MEDICAL & SURGICAL HISTORY:  History of colitis      H/O gastroenteritis      2019 novel coronavirus disease (COVID-19)      Urinary incontinence      Urinary tract infection      Acute UTI      Pain in right knee      Cataract      Neuralgia and neuritis      Muscle weakness      MDD (major depressive disorder)      Insomnia      Hypothyroidism      Leg edema      Diverticulosis of intestine      Difficulty in walking      Cognitive communication deficit      HLD (hyperlipidemia)      HTN (hypertension)      CVA (cerebrovascular accident)      GERD (gastroesophageal reflux disease)

## 2023-10-17 NOTE — CARE COORDINATION ASSESSMENT. - NSCAREPROVIDERS_GEN_ALL_CORE_FT
CARE PROVIDERS:  Accepting Physician: Selam Ferrer  Access Services: Anayeli Matos  Administration: Amos Royal  Administration: David Delgado  Admitting: Selam Ferrer  Attending: Selam Frerer  Cardiology Technician: Evelia Vlalecillo  Clinical Doc. Improvement: Theresa Jaeger  Consultant: Phil Magallanes  Consultant: Weil, Patricia  Consultant: Vernon Pradhan  Consultant: Brett Chahal  Consultant: Soumya Cleveland  Consultant: Elizabeth Tobin  Consultant: Namita Anderson  Consultant: Alvarez Briceño  ED Attending: Denver Goodrich  ED Nurse: Sue Toledo  Emergency Medicine: Qiana Peterson  HIM/Billing & Coding: Gabriela Garcia  Nurse: Yana Jones  Nurse: Selam Rivera  Nurse: Krista Chi  Ordered: Doctor, Unknown  Ordered: ADM, User  Override: Yana Jones  PCA/Nursing Assistant: Lexi Turpin  Respiratory Therapy: Sergio Paul  Respiratory Therapy: Margarita Guadalupe  Respiratory Therapy: Linus Covarrubias  Respiratory Therapy: Suze Diaz  Respiratory Therapy: Andre Craft  : Teresita Jimenez  Speech Pathology: Cecilia Mckeon// Supp. Assoc.: Lloyd Sotelo

## 2023-10-17 NOTE — CONSULT NOTE ADULT - ASSESSMENT
89 yo F with acute mixed resp. failure due to suspected BL PNA, contribution of pulmonary edema with BL pl. effusi 89 yo F with acute mixed resp. failure due to suspected BL PNA, contribution of pulmonary edema with BL pl. effusion    dtr patience   hcp  molst filled out  signed  updated  reviewed  dnr dni  limited intervention   no peg

## 2023-10-17 NOTE — H&P ADULT - PROBLEM SELECTOR PLAN 6
tele monitoring ,BP monitoring Admitted  to telemetry unit for monitoring , send 3 sets of cardiac enzymes to rule out acute coronary event, obtain ECHO to evaluate LVEF, cardiology consult  ,continue current management, O2 supply, anticoagulation plan as per cardiology consult

## 2023-10-17 NOTE — CARE COORDINATION ASSESSMENT. - NSDCPLANSERVICES_GEN_ALL_CORE
Anticipate return to [facility] for long term care upon d/c./Same as Prior Admission/Skilled Nursing Facility-Long Term

## 2023-10-17 NOTE — H&P ADULT - NSHPLABSRESULTS_GEN_ALL_CORE
< from: US Duplex Venous Lower Ext Complete, Bilateral (10.16.23 @ 19:55) >      RIGHT:  Normal compressibility of the RIGHT common femoral, femoral and popliteal   veins.  Doppler examination shows normal spontaneous and phasicflow.  No RIGHT calf vein thrombosis is detected.    LEFT:  Normal compressibility of the LEFT common femoral, femoral and popliteal   veins.  Doppler examination shows normal spontaneous and phasic flow.  No LEFT calf vein thrombosis is detected.    IMPRESSION:  No evidence of deep venous thrombosis in either lower extremity.    < end of copied text >    < from: CT Abdomen and Pelvis w/ IV Cont (10.16.23 @ 18:23) >    FINDINGS:  CHEST:  LUNGS PLEURA AND LARGE AIRWAYS: Patent central airways.  Moderate right pleural effusion and small left pleural effusion with   associated atelectasis. Diffuse airspaceopacities throughout the   bilateral lungs. Differential includes both infection and/or edema.   Correlate clinically. Recommend short-term follow-up to ensure resolution.    VESSELS: No pulmonary embolism.  HEART: Cardiomegaly. Coronary artery calcifications. No pericardial   effusion.  MEDIASTINUM AND EVA: No lymphadenopathy.  CHEST WALL AND LOWER NECK: Within normal limits.    ABDOMEN AND PELVIS:  LIVER: A few small hypodensities too small to characterize.  BILE DUCTS: Normal caliber.  GALLBLADDER: Within normal limits.  SPLEEN: Within normal limits.  PANCREAS: Within normal limits.  ADRENALS: Within normal limits.  KIDNEYS/URETERS: Small right renal cyst.    BLADDER: Within normal limits.  REPRODUCTIVE ORGANS: 3.2 cm right adnexal cyst. Recommend nonemergent   pelvic ultrasound.    BOWEL: Moderate amount of stool in rectum. No rectal wall thickening.   Scattered small colonic diverticula without diverticulitis. No bowel   obstruction. Appendix is normal.  PERITONEUM: No ascites.  VESSELS: Atherosclerotic changes.  RETROPERITONEUM/LYMPH NODES: No lymphadenopathy.  ABDOMINAL WALL: Within normal limits.  BONES: Degenerative changes. Cirrhosis.    IMPRESSION:    No pulmonary embolism.    Moderate right pleural effusion and small leftpleural effusion with   associated atelectasis. Diffuse airspace opacities throughout the   bilateral lungs. Differential includes both infection and/or edema.   Correlate clinically. Recommend short-term follow-up to ensure resolution.    3.2 cm right adnexal cyst. Recommend nonemergent pelvic ultrasound.    < end of copied text >

## 2023-10-17 NOTE — H&P ADULT - HISTORY OF PRESENT ILLNESS
89 y/o Female with PMH of HTN, HLD, CVA (w/ Residual ), Depression, GERD presents to  ED BIBA from SNF complaining of shortness of breath ,diagnosed with   1. Acute Hypoxic Respiratory Failure with hypoxia and hypercapnia   2. Acute Pulmonary Edema   3. Pleural Effusions  4. COPD exacerbation Admitted  to telemetry unit for monitoring , send 3 sets of cardiac enzymes to rule out acute coronary event, obtain ECHO to evaluate LVEF, cardiology consult  ,continue current management, O2 supply, anticoagulation plan as per cardiology consult Seen by ICU in ER -didnt require icu admission , seen by pulm /intensivnist and cardiology on admission  -On arrival to ED, patient in significant respiratory distress, hypoxic to 70s. Placed on NC with minimal improvement and work of breathing. Transitioned to NIPPV with significant improvement in oxygenation. CXR on admission with B/L hazy infiltrates, likely component of acute pulmonary edema. Recommend diuresis with Lasix. Order TTE, with Cardiology follow up in AM. Clinically with significant improvement in respiratory status. Palliative care consult requested ,to discuss advance directives and complete MOLST -DNR,DNI ,NO PEG ,LIMITED MED INTERVENTIONS   -Given profound hypoxia, concern for possible PE. Ddimer negative, with absent tachycardia and hypotension. Low likelihood. CT Angio ordered, with No pulmonary embolism., Moderate right pleural effusion and small left pleural effusion with associated atelectasis. Diffuse airspace opacities throughout the bilateral lungs. US Duplex B/L LE pending.   -No overt s/s of underlying cardiac event. Patient does not endorse chest pain/ pressure. Troponin negative x1, repeat pending. EKG on admission with no acute pathology.   -Respiratory status likely multifactorial in setting of acute pulmonary edema v pneumonia. CT chest with diffuse opacities in B/L lungs, concerning for underlying infection. s/p Vancomycin and Zosyn in ED. However, given possibility of superimposed infection would continue empiric coverage with Zosyn. Recommend BCx, SCx, UA, UCx, Legionella and Strep Pneumo.

## 2023-10-18 NOTE — PROGRESS NOTE ADULT - SUBJECTIVE AND OBJECTIVE BOX
Date/Time Patient Seen:  		  Referring MD:   Data Reviewed	       Patient is a 89y old  Female who presents with a chief complaint of SOB AND HYPOXIA (17 Oct 2023 09:47)      Subjective/HPI     PAST MEDICAL & SURGICAL HISTORY:  GERD (gastroesophageal reflux disease)    CVA (cerebrovascular accident)    HTN (hypertension)    HLD (hyperlipidemia)    Cognitive communication deficit    Difficulty in walking    Diverticulosis of intestine    Leg edema    Hypothyroidism    Insomnia    MDD (major depressive disorder)    Muscle weakness    Neuralgia and neuritis    Cataract    Pain in right knee    Acute UTI    Urinary tract infection    Urinary incontinence    2019 novel coronavirus disease (COVID-19)    H/O gastroenteritis    History of colitis          Medication list         MEDICATIONS  (STANDING):  albuterol/ipratropium for Nebulization 3 milliLiter(s) Nebulizer every 6 hours  dextrose 5% + sodium chloride 0.45%. 1000 milliLiter(s) (30 mL/Hr) IV Continuous <Continuous>  enoxaparin Injectable 30 milliGRAM(s) SubCutaneous every 24 hours  influenza  Vaccine (HIGH DOSE) 0.7 milliLiter(s) IntraMuscular once  lactobacillus acidophilus 1 Tablet(s) Oral every 12 hours  methylPREDNISolone sodium succinate Injectable 40 milliGRAM(s) IV Push daily  pantoprazole  Injectable 40 milliGRAM(s) IV Push daily  piperacillin/tazobactam IVPB.. 3.375 Gram(s) IV Intermittent every 8 hours    MEDICATIONS  (PRN):  acetaminophen     Tablet .. 650 milliGRAM(s) Oral every 6 hours PRN Temp greater or equal to 38C (100.4F), Mild Pain (1 - 3)  aluminum hydroxide/magnesium hydroxide/simethicone Suspension 30 milliLiter(s) Oral every 4 hours PRN Dyspepsia  melatonin 3 milliGRAM(s) Oral at bedtime PRN Insomnia  midodrine. 5 milliGRAM(s) Oral three times a day PRN for systolic BP<100  ondansetron Injectable 4 milliGRAM(s) IV Push every 8 hours PRN Nausea and/or Vomiting         Vitals log        ICU Vital Signs Last 24 Hrs  T(C): 36.4 (17 Oct 2023 10:13), Max: 36.4 (17 Oct 2023 10:13)  T(F): 97.6 (17 Oct 2023 10:13), Max: 97.6 (17 Oct 2023 10:13)  HR: 98 (18 Oct 2023 05:20) (60 - 115)  BP: 114/69 (17 Oct 2023 10:13) (114/69 - 117/63)  BP(mean): --  ABP: --  ABP(mean): --  RR: 20 (17 Oct 2023 10:13) (20 - 20)  SpO2: 97% (18 Oct 2023 05:20) (84% - 100%)    O2 Parameters below as of 18 Oct 2023 02:15  Patient On (Oxygen Delivery Method): BiPAP/CPAP, 60% increased to 100%                 Input and Output:  I&O's Detail    17 Oct 2023 07:01  -  18 Oct 2023 05:37  --------------------------------------------------------  IN:  Total IN: 0 mL    OUT:    Voided (mL): 400 mL  Total OUT: 400 mL    Total NET: -400 mL          Lab Data                        10.3   5.14  )-----------( 210      ( 17 Oct 2023 05:43 )             33.1     10-17    138  |  102  |  22  ----------------------------<  177<H>  4.3   |  30  |  0.98    Ca    8.2<L>      17 Oct 2023 05:43  Mg     2.3     10-16    TPro  6.6  /  Alb  2.6<L>  /  TBili  0.3  /  DBili  x   /  AST  21  /  ALT  17  /  AlkPhos  81  10-17    ABG - ( 18 Oct 2023 05:16 )  pH, Arterial: 7.30  pH, Blood: x     /  pCO2: 62    /  pO2: 83    / HCO3: 30    / Base Excess: 4.1   /  SaO2: 97.1                    Review of Systems	      Objective     Physical Examination    heart s1s2  lung dc BS      Pertinent Lab findings & Imaging      Servando:  NO   Adequate UO     I&O's Detail    17 Oct 2023 07:01  -  18 Oct 2023 05:37  --------------------------------------------------------  IN:  Total IN: 0 mL    OUT:    Voided (mL): 400 mL  Total OUT: 400 mL    Total NET: -400 mL               Discussed with:     Cultures:	        Radiology

## 2023-10-18 NOTE — CONSULT NOTE ADULT - ASSESSMENT
89 y/o Female with PMH of HTN, HLD, CVA (w/ Residual ), Depression, GERD presents to  ED BIBA from SNF complaining of shortness of breath ,diagnosed with   1. Acute Hypoxic Respiratory Failure with hypoxia and hypercapnia   2. Acute Pulmonary Edema   3. Pleural Effusions  4. COPD exacerbation Admitted  to telemetry unit for monitoring , send 3 sets of cardiac enzymes to rule out acute coronary event, obtain ECHO to evaluate LVEF, cardiology consult  ,continue current management, O2 supply, anticoagulation plan as per cardiology consult Seen by ICU in ER -didnt require icu admission , seen by pulm /intensivnist and cardiology on admission  -On arrival to ED, patient in significant respiratory distress, hypoxic to 70s. Placed on NC with minimal improvement and work of breathing. Transitioned to NIPPV with significant improvement in oxygenation. CXR on admission with B/L hazy infiltrates, likely component of acute pulmonary edema. Recommend diuresis with Lasix. Order TTE, with Cardiology follow up in AM. Clinically with significant improvement in respiratory status. Palliative care consult requested ,to discuss advance directives and complete MOLST -DNR,DNI ,NO PEG ,LIMITED MED INTERVENTIONS   -Given profound hypoxia, concern for possible PE. Ddimer negative, with absent tachycardia and hypotension. Low likelihood. CT Angio ordered, with No pulmonary embolism., Moderate right pleural effusion and small left pleural effusion with associated atelectasis. Diffuse airspace opacities throughout the bilateral lungs. US Duplex B/L LE pending.   -No overt s/s of underlying cardiac event. Patient does not endorse chest pain/ pressure. Troponin negative x1, repeat pending. EKG on admission with no acute pathology.   -Respiratory status likely multifactorial in setting of acute pulmonary edema v pneumonia. CT chest with diffuse opacities in B/L lungs, concerning for underlying infection. s/p Vancomycin and Zosyn in ED. However, given possibility of superimposed infection would continue empiric coverage with Zosyn. Recommend BCx, SCx, UA, UCx, Legionella and Strep Pneumo.  (17 Oct 2023 06:48)        ACUTE RENAL FAILURE: sodium chloride 0.45%. 1000 milliLiter(s) (40 mL/Hr) IV Continuous   Serum creatinine is  at   1.4  , approximating GFR at   ml/min.   There is no progression . No uremic symptoms  pos  evidence of anemia .  Fluid status stable.  Will continue to avoid nephrotoxic drugs.  Patient remains asymptomatic.   Continue current therapy.  hold  diuretic.  hold   ACE inhibitor.  hold   ARB.  Additional evaluation:   ECG,    echocardiogram,     CXR,  will obtained recent   renal ultrasound to evalaute kidney size and possible stones ,if cr is not improved with iv fluid       Admit for septic workup and ID evaluation,send blood and urine cx,serial lactate levels,monitor vitals closley,ivfs hydration,monitor urine output and renal profile,iv abx   piperacillin/tazobactam IVPB.. 3.375 Gram(s) IV Intermittent every 8 hours

## 2023-10-18 NOTE — DIETITIAN INITIAL EVALUATION ADULT - PROBLEM/PLAN-8
Opdivo complete and tolerated well. PIV flushed with NS removed catheter tip intact. D/c home ambulated away from unit, steady gait, accompanied by daughter.    DISPLAY PLAN FREE TEXT

## 2023-10-18 NOTE — DIETITIAN INITIAL EVALUATION ADULT - NSFNSPHYEXAMSKINFT_GEN_A_CORE
Pressure Injury 1: sacrum, I  Pressure Injury 2: R heel I  Pressure Injury 3: L heel I  Pressure Injury 4: bridge of the nose, Stage I

## 2023-10-18 NOTE — PROGRESS NOTE ADULT - SUBJECTIVE AND OBJECTIVE BOX
Kasandra, Division of Infectious Diseases  MARTINEZ William Y. Patel, S. Shah, G. [facility]  [phone]    Name: JOSE ROY  Age: 89y  Gender: Female  MRN: [id number]    Interval History:  Patient seen and examined at bedside   No acute overnight events. Afebrile  on BPAP  Notes reviewed    Antibiotics:  piperacillin/tazobactam IVPB.. 3.375 Gram(s) IV Intermittent every 8 hours      Medications:  acetaminophen     Tablet .. 650 milliGRAM(s) Oral every 6 hours PRN  albuterol/ipratropium for Nebulization 3 milliLiter(s) Nebulizer every 6 hours  aluminum hydroxide/magnesium hydroxide/simethicone Suspension 30 milliLiter(s) Oral every 4 hours PRN  dextrose 5% + sodium chloride 0.45%. 1000 milliLiter(s) IV Continuous <Continuous>  enoxaparin Injectable 30 milliGRAM(s) SubCutaneous every 24 hours  influenza  Vaccine (HIGH DOSE) 0.7 milliLiter(s) IntraMuscular once  lactobacillus acidophilus 1 Tablet(s) Oral every 12 hours  melatonin 3 milliGRAM(s) Oral at bedtime PRN  methylPREDNISolone sodium succinate Injectable 40 milliGRAM(s) IV Push daily  midodrine. 5 milliGRAM(s) Oral three times a day PRN  ondansetron Injectable 4 milliGRAM(s) IV Push every 8 hours PRN  pantoprazole  Injectable 40 milliGRAM(s) IV Push daily  piperacillin/tazobactam IVPB.. 3.375 Gram(s) IV Intermittent every 8 hours      Review of Systems:  unable to obtain    Allergies: No Known Allergies    For details regarding the patient's past medical history, social history, family history, and other miscellaneous elements, please refer the initial infectious diseases consultation and/or the admitting history and physical examination for this admission.    Objective:  Vitals:   T(C): 36.7 (10-18-23 @ 11:16), Max: 36.7 (10-18-23 @ 11:16)  HR: 79 (10-18-23 @ 11:47) (70 - 115)  BP: 92/52 (10-18-23 @ 11:16) (92/52 - 92/52)  RR: 19 (10-18-23 @ 11:16) (19 - 19)  SpO2: 98% (10-18-23 @ 11:47) (84% - 99%)    Physical Examination:  General: nontoxic-appearing, no acute distress  HEENT: NC/AT, EOMI, anicteric  Lungs: on BPAP  Heart: Regular rate and rhythm. No murmur, rub or gallop.  Abdomen: Soft. Nondistended. Nontender.   Extremities: No cyanosis or clubbing. No edema.   Skin: Warm. Dry. Good turgor. No rash.      Laboratory Studies:  CBC:                       10.1   10.82 )-----------( 221      ( 18 Oct 2023 06:56 )             33.1     CMP: 10-18    140  |  102  |  36<H>  ----------------------------<  146<H>  4.1   |  30  |  1.40<H>    Ca    8.1<L>      18 Oct 2023 09:34  Phos  4.7     10-18  Mg     2.4     10-18    TPro  6.5  /  Alb  2.7<L>  /  TBili  0.5  /  DBili  x   /  AST  34  /  ALT  17  /  AlkPhos  65  10-18    LIVER FUNCTIONS - ( 18 Oct 2023 09:34 )  Alb: 2.7 g/dL / Pro: 6.5 g/dL / ALK PHOS: 65 U/L / ALT: 17 U/L / AST: 34 U/L / GGT: x           Urinalysis Basic - ( 18 Oct 2023 09:34 )    Color: x / Appearance: x / SG: x / pH: x  Gluc: 146 mg/dL / Ketone: x  / Bili: x / Urobili: x   Blood: x / Protein: x / Nitrite: x   Leuk Esterase: x / RBC: x / WBC x   Sq Epi: x / Non Sq Epi: x / Bacteria: x        Microbiology: reviewed    Culture - Urine (collected 10-16-23 @ 20:43)  Source: Clean Catch Clean Catch (Midstream)  Final Report (10-18-23 @ 00:29):    No growth    Culture - Blood (collected 10-16-23 @ 17:10)  Source: .Blood Blood-Peripheral  Preliminary Report (10-17-23 @ 23:02):    No growth at 24 hours    Culture - Blood (collected 10-16-23 @ 17:05)  Source: .Blood Blood-Peripheral  Preliminary Report (10-17-23 @ 23:02):    No growth at 24 hours          Radiology: reviewed       Kasandra, Division of Infectious Diseases  MARTINEZ William Y. Patel, S. Shah, G. [facility]  [phone]    Name: JOSE ROY  Age: 89y  Gender: Female  MRN: [id number]    Interval History:  Patient seen and examined at bedside   No acute overnight events. Afebrile  on BPAP  Notes reviewed    Antibiotics:  piperacillin/tazobactam IVPB.. 3.375 Gram(s) IV Intermittent every 8 hours      Medications:  acetaminophen     Tablet .. 650 milliGRAM(s) Oral every 6 hours PRN  albuterol/ipratropium for Nebulization 3 milliLiter(s) Nebulizer every 6 hours  aluminum hydroxide/magnesium hydroxide/simethicone Suspension 30 milliLiter(s) Oral every 4 hours PRN  dextrose 5% + sodium chloride 0.45%. 1000 milliLiter(s) IV Continuous <Continuous>  enoxaparin Injectable 30 milliGRAM(s) SubCutaneous every 24 hours  influenza  Vaccine (HIGH DOSE) 0.7 milliLiter(s) IntraMuscular once  lactobacillus acidophilus 1 Tablet(s) Oral every 12 hours  melatonin 3 milliGRAM(s) Oral at bedtime PRN  methylPREDNISolone sodium succinate Injectable 40 milliGRAM(s) IV Push daily  midodrine. 5 milliGRAM(s) Oral three times a day PRN  ondansetron Injectable 4 milliGRAM(s) IV Push every 8 hours PRN  pantoprazole  Injectable 40 milliGRAM(s) IV Push daily  piperacillin/tazobactam IVPB.. 3.375 Gram(s) IV Intermittent every 8 hours      Review of Systems:  unable to obtain    Allergies: No Known Allergies    For details regarding the patient's past medical history, social history, family history, and other miscellaneous elements, please refer the initial infectious diseases consultation and/or the admitting history and physical examination for this admission.    Objective:  Vitals:   T(C): 36.7 (10-18-23 @ 11:16), Max: 36.7 (10-18-23 @ 11:16)  HR: 79 (10-18-23 @ 11:47) (70 - 115)  BP: 92/52 (10-18-23 @ 11:16) (92/52 - 92/52)  RR: 19 (10-18-23 @ 11:16) (19 - 19)  SpO2: 98% (10-18-23 @ 11:47) (84% - 99%)    Physical Examination:  General: iWOB, on BPAP, elderly W  HEENT: NC/AT, EOMI, anicteric  Lungs: on BPAP  Heart: Regular rate and rhythm. No murmur, rub or gallop.  Abdomen: Soft. Nondistended. Nontender.   Extremities: No cyanosis or clubbing. No edema.   Skin: Warm. Dry. Good turgor. No rash.      Laboratory Studies:  CBC:                       10.1   10.82 )-----------( 221      ( 18 Oct 2023 06:56 )             33.1     CMP: 10-18    140  |  102  |  36<H>  ----------------------------<  146<H>  4.1   |  30  |  1.40<H>    Ca    8.1<L>      18 Oct 2023 09:34  Phos  4.7     10-18  Mg     2.4     10-18    TPro  6.5  /  Alb  2.7<L>  /  TBili  0.5  /  DBili  x   /  AST  34  /  ALT  17  /  AlkPhos  65  10-18    LIVER FUNCTIONS - ( 18 Oct 2023 09:34 )  Alb: 2.7 g/dL / Pro: 6.5 g/dL / ALK PHOS: 65 U/L / ALT: 17 U/L / AST: 34 U/L / GGT: x           Urinalysis Basic - ( 18 Oct 2023 09:34 )    Color: x / Appearance: x / SG: x / pH: x  Gluc: 146 mg/dL / Ketone: x  / Bili: x / Urobili: x   Blood: x / Protein: x / Nitrite: x   Leuk Esterase: x / RBC: x / WBC x   Sq Epi: x / Non Sq Epi: x / Bacteria: x        Microbiology: reviewed    Culture - Urine (collected 10-16-23 @ 20:43)  Source: Clean Catch Clean Catch (Midstream)  Final Report (10-18-23 @ 00:29):    No growth    Culture - Blood (collected 10-16-23 @ 17:10)  Source: .Blood Blood-Peripheral  Preliminary Report (10-17-23 @ 23:02):    No growth at 24 hours    Culture - Blood (collected 10-16-23 @ 17:05)  Source: .Blood Blood-Peripheral  Preliminary Report (10-17-23 @ 23:02):    No growth at 24 hours          Radiology: reviewed

## 2023-10-18 NOTE — PROGRESS NOTE ADULT - SUBJECTIVE AND OBJECTIVE BOX
PROGRESS NOTE  Patient is a 89y old  Female who presents with a chief complaint of SOB AND HYPOXIA (18 Oct 2023 11:59)  Chart and available morning labs /imaging are reviewed electronically , urgent issues addressed . More information  is being added upon completion of rounds , when more information is collected and management discussed with consultants , medical staff and social service/case management on the floor   OVERNIGHT  No new issues reported by medical staff . All above noted Patient is resting in a bed comfortably .Confused ,poor mentation .No distress noted Remains on bipap ,desaturated yesterday after NC was tried   HPI:  89 y/o Female with PMH of HTN, HLD, CVA (w/ Residual ), Depression, GERD presents to  ED [facility]IB[facility] from [facility] complaining of shortness of breath ,diagnosed with   1. Acute Hypoxic Respiratory Failure with hypoxia and hypercapnia   2. Acute Pulmonary Edema   3. Pleural Effusions  4. COPD exacerbation Admitted  to telemetry unit for monitoring , send 3 sets of cardiac enzymes to rule out acute coronary event, obtain ECHO to evaluate LVEF, cardiology consult  ,continue current management, O2 supply, anticoagulation plan as per cardiology consult Seen by ICU in ER -didnt require icu admission , seen by pulm /intensivnist and cardiology on admission  -On arrival to ED, patient in significant respiratory distress, hypoxic to 70s. Placed on NC with minimal improvement and work of breathing. Transitioned to NIPPV with significant improvement in oxygenation. CXR on admission with B/L hazy infiltrates, likely component of acute pulmonary edema. Recommend diuresis with Lasix. Order TTE, with Cardiology follow up in AM. Clinically with significant improvement in respiratory status. Palliative care consult requested ,to discuss advance directives and complete MOLST -DNR,DNI ,NO PEG ,LIMITED MED INTERVENTIONS   -Given profound hypoxia, concern for possible PE. Ddimer negative, with absent tachycardia and hypotension. Low likelihood. CT Angio ordered, with No pulmonary embolism., Moderate right pleural effusion and small left pleural effusion with associated atelectasis. Diffuse airspace opacities throughout the bilateral lungs. US Duplex B/L LE pending.   -No overt s/s of underlying cardiac event. Patient does not endorse chest pain/ pressure. Troponin negative x1, repeat pending. EKG on admission with no acute pathology.   -Respiratory status likely multifactorial in setting of acute pulmonary edema v pneumonia. CT chest with diffuse opacities in B/L lungs, concerning for underlying infection. s/p Vancomycin and Zosyn in ED. However, given possibility of superimposed infection would continue empiric coverage with Zosyn. Recommend BCx, SCx, UA, UCx, Legionella and Strep Pneumo.  (17 Oct 2023 06:48)  PAST MEDICAL & SURGICAL HISTORY:  GERD (gastroesophageal reflux disease)      CVA (cerebrovascular accident)      HTN (hypertension)      HLD (hyperlipidemia)      Cognitive communication deficit      Difficulty in walking      Diverticulosis of intestine      Leg edema      Hypothyroidism      Insomnia      MDD (major depressive disorder)      Muscle weakness      Neuralgia and neuritis      Cataract      Pain in right knee      Acute UTI      Urinary tract infection      Urinary incontinence      2019 novel coronavirus disease (COVID-19)      H/O gastroenteritis      History of colitis          MEDICATIONS  (STANDING):  albuterol/ipratropium for Nebulization 3 milliLiter(s) Nebulizer every 6 hours  dextrose 5% + sodium chloride 0.45%. 1000 milliLiter(s) (30 mL/Hr) IV Continuous <Continuous>  enoxaparin Injectable 30 milliGRAM(s) SubCutaneous every 24 hours  influenza  Vaccine (HIGH DOSE) 0.7 milliLiter(s) IntraMuscular once  lactobacillus acidophilus 1 Tablet(s) Oral every 12 hours  methylPREDNISolone sodium succinate Injectable 40 milliGRAM(s) IV Push daily  pantoprazole  Injectable 40 milliGRAM(s) IV Push daily  piperacillin/tazobactam IVPB.. 3.375 Gram(s) IV Intermittent every 8 hours    MEDICATIONS  (PRN):  acetaminophen     Tablet .. 650 milliGRAM(s) Oral every 6 hours PRN Temp greater or equal to 38C (100.4F), Mild Pain (1 - 3)  aluminum hydroxide/magnesium hydroxide/simethicone Suspension 30 milliLiter(s) Oral every 4 hours PRN Dyspepsia  melatonin 3 milliGRAM(s) Oral at bedtime PRN Insomnia  midodrine. 5 milliGRAM(s) Oral three times a day PRN for systolic BP<100  ondansetron Injectable 4 milliGRAM(s) IV Push every 8 hours PRN Nausea and/or Vomiting      OBJECTIVE    T(C): 36.7 (10-18-23 @ 11:16), Max: 36.7 (10-18-23 @ 11:16)  HR: 79 (10-18-23 @ 11:47) (70 - 115)  BP: 92/52 (10-18-23 @ 11:16) (92/52 - 92/52)  RR: 19 (10-18-23 @ 11:16) (19 - 19)  SpO2: 98% (10-18-23 @ 11:47) (84% - 99%)  Wt(kg): --  I&O's Summary    17 Oct 2023 07:01  -  18 Oct 2023 07:00  --------------------------------------------------------  IN: 0 mL / OUT: 400 mL / NET: -400 mL    18 Oct 2023 07:01  -  18 Oct 2023 12:29  --------------------------------------------------------  IN: 0 mL / OUT: 300 mL / NET: -300 mL          REVIEW OF SYSTEMS:  Patient is  unable to provide any information/ROS  due to baseline mental status.   PHYSICAL EXAM:  Appearance: NAD. VS past 24 hrs -as above   HEENT:   Moist oral mucosa. Conjunctiva clear b/l.   Neck : supple  Respiratory: Lungs diminished bs at bases   Gastrointestinal:  Soft, nontender. No rebound. No rigidity. BS present	  Cardiovascular: RRR ,S1S2 present  Neurologic: Non-focal. Moving all extremities.  Extremities: No edema. No erythema. No calf tenderness.  Skin: No rashes, No ecchymoses, No cyanosis.	  wounds ,skin lesions-See skin assesment flow sheet   LABS:                        10.1   10.82 )-----------( 221      ( 18 Oct 2023 06:56 )             33.1     10-18    140  |  102  |  36<H>  ----------------------------<  146<H>  4.1   |  30  |  1.40<H>    Ca    8.1<L>      18 Oct 2023 09:34  Phos  4.7     10-18  Mg     2.4     10-18    TPro  6.5  /  Alb  2.7<L>  /  TBili  0.5  /  DBili  x   /  AST  34  /  ALT  17  /  AlkPhos  65  10-18    CAPILLARY BLOOD GLUCOSE        PT/INR - ( 18 Oct 2023 09:34 )   PT: 13.1 sec;   INR: 1.12 ratio         PTT - ( 16 Oct 2023 17:05 )  PTT:32.3 sec  Urinalysis Basic - ( 18 Oct 2023 09:34 )    Color: x / Appearance: x / SG: x / pH: x  Gluc: 146 mg/dL / Ketone: x  / Bili: x / Urobili: x   Blood: x / Protein: x / Nitrite: x   Leuk Esterase: x / RBC: x / WBC x   Sq Epi: x / Non Sq Epi: x / Bacteria: x        Culture - Urine (collected 16 Oct 2023 20:43)  Source: Clean Catch Clean Catch (Midstream)  Final Report (18 Oct 2023 00:29):    No growth    Culture - Blood (collected 16 Oct 2023 17:10)  Source: .Blood Blood-Peripheral  Preliminary Report (17 Oct 2023 23:02):    No growth at 24 hours    Culture - Blood (collected 16 Oct 2023 17:05)  Source: .Blood Blood-Peripheral  Preliminary Report (17 Oct 2023 23:02):    No growth at 24 hours      RADIOLOGY & ADDITIONAL TESTS: < from: US Duplex Venous Lower Ext Complete, Bilateral (10.16.23 @ 19:55) >  ACC: [id number] EXAM:  US DPLX LWR EXT VEINS COMPL BI   ORDERED BY:  SHRUHTI IRAHETA     PROCEDURE DATE:  10/16/2023          INTERPRETATION:  CLINICAL INFORMATION: Edema.    COMPARISON: None available.    TECHNIQUE: Duplex sonography of the BILATERALLOWER extremity veins with   color and spectral Doppler, with and without compression.    FINDINGS:    RIGHT:  Normal compressibility of the RIGHT common femoral, femoral and popliteal   veins.  Doppler examination shows normal spontaneous and phasicflow.  No RIGHT calf vein thrombosis is detected.    LEFT:  Normal compressibility of the LEFT common femoral, femoral and popliteal   veins.  Doppler examination shows normal spontaneous and phasic flow.  No LEFT calf vein thrombosis is detected.    IMPRESSION:  No evidence of deep venous thrombosis in either lower extremity.    < end of copied text >  < from: CT Abdomen and Pelvis w/ IV Cont (10.16.23 @ 18:23) >  ACC: [id number] EXAM:  CT ANGIO CHEST PULM ART WAWIC   ORDERED BY:  SHRUTHI IRAHETA     ACC: [id number] EXAM:  CT ABDOMEN AND PELVIS IC   ORDERED BY:  SHRUTHI IRAHETA     PROCEDURE DATE:  10/16/2023          INTERPRETATION:  CLINICAL INFORMATION: Respiratory failure. Edema.   Pneumonia. Question pulmonary embolism    COMPARISON: None.    CONTRAST/COMPLICATIONS:  IV Contrast: Omnipaque 350 (accession [id number]), IV contrast documented   in unlinked concurrent exam (accession [id number])  90 cc administered  10   cc discarded  Oral Contrast: NONE  Complications: None reported at time of study completion    PROCEDURE:  CT Angiography of the Chest was performed followed by portal venous phase   imaging of the Abdomen and Pelvis.  Sagittal and coronal reformats were performed as well as 3D (MIP)   reconstructions.    FINDINGS:  CHEST:  LUNGS PLEURA AND LARGE AIRWAYS: Patent central airways.  Moderate right pleural effusion and small left pleural effusion with   associated atelectasis. Diffuse airspaceopacities throughout the   bilateral lungs. Differential includes both infection and/or edema.   Correlate clinically. Recommend short-term follow-up to ensure resolution.    VESSELS: No pulmonary embolism.  HEART: Cardiomegaly. Coronary artery calcifications. No pericardial   effusion.  MEDIASTINUM AND EVA: No lymphadenopathy.  CHEST WALL AND LOWER NECK: Within normal limits.    ABDOMEN AND PELVIS:  LIVER: A few small hypodensities too small to characterize.  BILE DUCTS: Normal caliber.  GALLBLADDER: Within normal limits.  SPLEEN: Within normal limits.  PANCREAS: Within normal limits.  ADRENALS: Within normal limits.  KIDNEYS/URETERS: Small right renal cyst.    BLADDER: Within normal limits.  REPRODUCTIVE ORGANS: 3.2 cm right adnexal cyst. Recommend nonemergent   pelvic ultrasound.    BOWEL: Moderate amount of stool in rectum. No rectal wall thickening.   Scattered small colonic diverticula without diverticulitis. No bowel   obstruction. Appendix is normal.  PERITONEUM: No ascites.  VESSELS: Atherosclerotic changes.  RETROPERITONEUM/LYMPH NODES: No lymphadenopathy.  ABDOMINAL WALL: Within normal limits.  BONES: Degenerative changes. Cirrhosis.    IMPRESSION:    No pulmonary embolism.    Moderate right pleural effusion and small leftpleural effusion with   associated atelectasis. Diffuse airspace opacities throughout the   bilateral lungs. Differential includes both infection and/or edema.   Correlate clinically. Recommend short-term follow-up to ensure resolution.    3.2 cm right adnexal cyst. Recommend nonemergent pelvic ultrasound.    < end of copied text >     reviewed elctronically  ASSESSMENT/PLAN: 	  25 minutes aggregate time was spent on this visit, 50% visit time spent in care co-ordination with other attendings and counselling patient .I have discussed care plan with patient / HCP/family member ,who expressed understanding of problems treatment and their effect and side effects, alternatives in details. I have asked if they have any questions and concerns and appropriately addressed them to best of my ability.

## 2023-10-18 NOTE — DIETITIAN INITIAL EVALUATION ADULT - PERTINENT MEDS FT
MEDICATIONS  (STANDING):  albuterol/ipratropium for Nebulization 3 milliLiter(s) Nebulizer every 6 hours  dextrose 5% + sodium chloride 0.45%. 1000 milliLiter(s) (30 mL/Hr) IV Continuous <Continuous>  enoxaparin Injectable 30 milliGRAM(s) SubCutaneous every 24 hours  influenza  Vaccine (HIGH DOSE) 0.7 milliLiter(s) IntraMuscular once  lactobacillus acidophilus 1 Tablet(s) Oral every 12 hours  methylPREDNISolone sodium succinate Injectable 40 milliGRAM(s) IV Push daily  pantoprazole  Injectable 40 milliGRAM(s) IV Push daily  piperacillin/tazobactam IVPB.. 3.375 Gram(s) IV Intermittent every 8 hours    MEDICATIONS  (PRN):  acetaminophen     Tablet .. 650 milliGRAM(s) Oral every 6 hours PRN Temp greater or equal to 38C (100.4F), Mild Pain (1 - 3)  aluminum hydroxide/magnesium hydroxide/simethicone Suspension 30 milliLiter(s) Oral every 4 hours PRN Dyspepsia  melatonin 3 milliGRAM(s) Oral at bedtime PRN Insomnia  midodrine. 5 milliGRAM(s) Oral three times a day PRN for systolic BP<100  ondansetron Injectable 4 milliGRAM(s) IV Push every 8 hours PRN Nausea and/or Vomiting

## 2023-10-18 NOTE — CONSULT NOTE ADULT - NSCONSULTADDITIONALINFOA_GEN_ALL_CORE
MEDICATIONS  (STANDING):  albuterol/ipratropium for Nebulization 3 milliLiter(s) Nebulizer every 6 hours  enoxaparin Injectable 30 milliGRAM(s) SubCutaneous every 24 hours  influenza  Vaccine (HIGH DOSE) 0.7 milliLiter(s) IntraMuscular once  lactobacillus acidophilus 1 Tablet(s) Oral every 12 hours  methylPREDNISolone sodium succinate Injectable 40 milliGRAM(s) IV Push daily  pantoprazole  Injectable 40 milliGRAM(s) IV Push daily  piperacillin/tazobactam IVPB.. 3.375 Gram(s) IV Intermittent every 8 hours  sodium chloride 0.45%. 1000 milliLiter(s) (40 mL/Hr) IV Continuous <Continuous>
no

## 2023-10-18 NOTE — PROGRESS NOTE ADULT - SUBJECTIVE AND OBJECTIVE BOX
CHIEF COMPLAINT/ REASON FOR VISIT  .. Patient was seen to address the  issue listed under PROBLEM LIST which is located toward bottom of this note     JOSE ROY    [facility] [address] W1    Allergies    No Known Allergies    Intolerances        PAST MEDICAL & SURGICAL HISTORY:  GERD (gastroesophageal reflux disease)      CVA (cerebrovascular accident)      HTN (hypertension)      HLD (hyperlipidemia)      Cognitive communication deficit      Difficulty in walking      Diverticulosis of intestine      Leg edema      Hypothyroidism      Insomnia      MDD (major depressive disorder)      Muscle weakness      Neuralgia and neuritis      Cataract      Pain in right knee      Acute UTI      Urinary tract infection      Urinary incontinence      2019 novel coronavirus disease (COVID-19)      H/O gastroenteritis      History of colitis          FAMILY HISTORY:      Home Medications:      MEDICATIONS  (STANDING):  albuterol/ipratropium for Nebulization 3 milliLiter(s) Nebulizer every 6 hours  dextrose 5% + sodium chloride 0.45%. 1000 milliLiter(s) (30 mL/Hr) IV Continuous <Continuous>  enoxaparin Injectable 30 milliGRAM(s) SubCutaneous every 24 hours  influenza  Vaccine (HIGH DOSE) 0.7 milliLiter(s) IntraMuscular once  lactobacillus acidophilus 1 Tablet(s) Oral every 12 hours  methylPREDNISolone sodium succinate Injectable 40 milliGRAM(s) IV Push daily  pantoprazole  Injectable 40 milliGRAM(s) IV Push daily  piperacillin/tazobactam IVPB.. 3.375 Gram(s) IV Intermittent every 8 hours    MEDICATIONS  (PRN):  acetaminophen     Tablet .. 650 milliGRAM(s) Oral every 6 hours PRN Temp greater or equal to 38C (100.4F), Mild Pain (1 - 3)  aluminum hydroxide/magnesium hydroxide/simethicone Suspension 30 milliLiter(s) Oral every 4 hours PRN Dyspepsia  melatonin 3 milliGRAM(s) Oral at bedtime PRN Insomnia  midodrine. 5 milliGRAM(s) Oral three times a day PRN for systolic BP<100  ondansetron Injectable 4 milliGRAM(s) IV Push every 8 hours PRN Nausea and/or Vomiting      Diet, NPO:   Except Medications (10-17-23 @ 06:39) [Active]          Vital Signs Last 24 Hrs  T(C): 36.4 (17 Oct 2023 10:13), Max: 36.4 (17 Oct 2023 10:13)  T(F): 97.6 (17 Oct 2023 10:13), Max: 97.6 (17 Oct 2023 10:13)  HR: 98 (18 Oct 2023 05:20) (69 - 115)  BP: 114/69 (17 Oct 2023 10:13) (114/69 - 114/69)  BP(mean): --  RR: 20 (17 Oct 2023 10:13) (20 - 20)  SpO2: 97% (18 Oct 2023 05:20) (84% - 99%)    Parameters below as of 18 Oct 2023 02:15  Patient On (Oxygen Delivery Method): BiPAP/CPAP, 60% increased to 100%          10-17-23 @ 07:01  -  10-18-23 @ 07:00  --------------------------------------------------------  IN: 0 mL / OUT: 400 mL / NET: -400 mL              LABS:                        10.3   5.14  )-----------( 210      ( 17 Oct 2023 05:43 )             33.1     10-17    138  |  102  |  22  ----------------------------<  177<H>  4.3   |  30  |  0.98    Ca    8.2<L>      17 Oct 2023 05:43  Mg     2.3     10-16    TPro  6.6  /  Alb  2.6<L>  /  TBili  0.3  /  DBili  x   /  AST  21  /  ALT  17  /  AlkPhos  81  10-17    PT/INR - ( 16 Oct 2023 17:05 )   PT: 11.0 sec;   INR: 0.94 ratio         PTT - ( 16 Oct 2023 17:05 )  PTT:32.3 sec  Urinalysis Basic - ( 17 Oct 2023 05:43 )    Color: x / Appearance: x / SG: x / pH: x  Gluc: 177 mg/dL / Ketone: x  / Bili: x / Urobili: x   Blood: x / Protein: x / Nitrite: x   Leuk Esterase: x / RBC: x / WBC x   Sq Epi: x / Non Sq Epi: x / Bacteria: x        ABG - ( 18 Oct 2023 05:16 )  pH, Arterial: 7.30  pH, Blood: x     /  pCO2: 62    /  pO2: 83    / HCO3: 30    / Base Excess: 4.1   /  SaO2: 97.1                WBC:  WBC Count: 5.14 K/uL (10-17 @ 05:43)  WBC Count: 11.15 K/uL (10-16 @ 17:05)      MICROBIOLOGY:  RECENT CULTURES:  10-16 Clean Catch Clean Catch (Midstream) XXXX XXXX   No growth    10-16 .Blood Blood-Peripheral XXXX XXXX   No growth at 24 hours    10-16 .Blood Blood-Peripheral XXXX XXXX   No growth at 24 hours                PT/INR - ( 16 Oct 2023 17:05 )   PT: 11.0 sec;   INR: 0.94 ratio         PTT - ( 16 Oct 2023 17:05 )  PTT:32.3 sec    Sodium:  Sodium: 138 mmol/L (10-17 @ 05:43)  Sodium: 137 mmol/L (10-16 @ 17:05)      0.98 mg/dL 10-17 @ 05:43  0.95 mg/dL 10-16 @ 17:05      Hemoglobin:  Hemoglobin: 10.3 g/dL (10-17 @ 05:43)  Hemoglobin: 12.8 g/dL (10-16 @ 17:05)      Platelets: Platelet Count - Automated: 210 K/uL (10-17 @ 05:43)  Platelet Count - Automated: 298 K/uL (10-16 @ 17:05)      LIVER FUNCTIONS - ( 17 Oct 2023 05:43 )  Alb: 2.6 g/dL / Pro: 6.6 g/dL / ALK PHOS: 81 U/L / ALT: 17 U/L / AST: 21 U/L / GGT: x             Urinalysis Basic - ( 17 Oct 2023 05:43 )    Color: x / Appearance: x / SG: x / pH: x  Gluc: 177 mg/dL / Ketone: x  / Bili: x / Urobili: x   Blood: x / Protein: x / Nitrite: x   Leuk Esterase: x / RBC: x / WBC x   Sq Epi: x / Non Sq Epi: x / Bacteria: x        RADIOLOGY & ADDITIONAL STUDIES:      MICROBIOLOGY:  RECENT CULTURES:  10-16 Clean Catch Clean Catch (Midstream) XXXX XXXX   No growth    10-16 .Blood Blood-Peripheral XXXX XXXX   No growth at 24 hours    10-16 .Blood Blood-Peripheral XXXX XXXX   No growth at 24 hours

## 2023-10-18 NOTE — DIETITIAN INITIAL EVALUATION ADULT - ORAL INTAKE PTA/DIET HISTORY
pt not able to be interviewed, per transfer documents, pt was on TERESA regular diet, extra gravy/sauces and Boost VHC 60cc QID (530 vee, 22 gm protein).

## 2023-10-18 NOTE — DIETITIAN INITIAL EVALUATION ADULT - REASON FOR ADMISSION
Acute pulmonary edema    "89 y/o Female with PMH of HTN, HLD, CVA (w/ Residual ), Depression, GERD presents to  ED BIBA from [facility] complaining of shortness of breath ,diagnosed with   1. Acute Hypoxic Respiratory Failure with hypoxia and hypercapnia   2. Acute Pulmonary Edema   3. Pleural Effusions  4. COPD exacerbation Admitted  to telemetry unit for monitoring

## 2023-10-18 NOTE — CONSULT NOTE ADULT - SUBJECTIVE AND OBJECTIVE BOX
Patient is a 89y Female whom presented to the hospital with antonio     PAST MEDICAL & SURGICAL HISTORY:  GERD (gastroesophageal reflux disease)      CVA (cerebrovascular accident)      HTN (hypertension)      HLD (hyperlipidemia)      Cognitive communication deficit      Difficulty in walking      Diverticulosis of intestine      Leg edema      Hypothyroidism      Insomnia      MDD (major depressive disorder)      Muscle weakness      Neuralgia and neuritis      Cataract      Pain in right knee      Acute UTI      Urinary tract infection      Urinary incontinence      2019 novel coronavirus disease (COVID-19)      H/O gastroenteritis      History of colitis          MEDICATIONS  (STANDING):  albuterol/ipratropium for Nebulization 3 milliLiter(s) Nebulizer every 6 hours  enoxaparin Injectable 30 milliGRAM(s) SubCutaneous every 24 hours  influenza  Vaccine (HIGH DOSE) 0.7 milliLiter(s) IntraMuscular once  lactobacillus acidophilus 1 Tablet(s) Oral every 12 hours  methylPREDNISolone sodium succinate Injectable 40 milliGRAM(s) IV Push daily  pantoprazole  Injectable 40 milliGRAM(s) IV Push daily  piperacillin/tazobactam IVPB.. 3.375 Gram(s) IV Intermittent every 8 hours  sodium chloride 0.45%. 1000 milliLiter(s) (40 mL/Hr) IV Continuous <Continuous>      Allergies    No Known Allergies    Intolerances        SOCIAL HISTORY:  Denies ETOh,Smoking,     FAMILY HISTORY:      REVIEW OF SYSTEMS:  unable to obtained a good review system        VITAL:  T(C): , Max: 36.7 (10-18-23 @ 11:16)  T(F): , Max: 98 (10-18-23 @ 11:16)  HR: 77 (10-18-23 @ 14:20)  BP: 99/60 (10-18-23 @ 13:34)  BP(mean): --  RR: 19 (10-18-23 @ 11:16)  SpO2: 99% (10-18-23 @ 14:20)  Wt(kg): --    I and O's:    10-17 @ 07:01  -  10-18 @ 07:00  --------------------------------------------------------  IN: 0 mL / OUT: 400 mL / NET: -400 mL    10-18 @ 07:01  -  10-18 @ 14:57  --------------------------------------------------------  IN: 0 mL / OUT: 300 mL / NET: -300 mL          PHYSICAL EXAM:    Constitutional: lethargic   HEENT: conjunctive   clear   Neck:  No JVD  Respiratory: decrease bs b/l   Cardiovascular: S1 and S2  Gastrointestinal: BS+, soft, NT/ND  Extremities: No peripheral edema  Neurological: no focal deficits  Skin: dry   Access: Not applicable    LABS:                        10.1   10.82 )-----------( 221      ( 18 Oct 2023 06:56 )             33.1     10-18    140  |  102  |  36<H>  ----------------------------<  146<H>  4.1   |  30  |  1.40<H>    Ca    8.1<L>      18 Oct 2023 09:34  Phos  4.7     10-18  Mg     2.4     10-18    TPro  6.5  /  Alb  2.7<L>  /  TBili  0.5  /  DBili  x   /  AST  34  /  ALT  17  /  AlkPhos  65  10-18      Urine Studies:  Urinalysis Basic - ( 18 Oct 2023 09:34 )    Color: x / Appearance: x / SG: x / pH: x  Gluc: 146 mg/dL / Ketone: x  / Bili: x / Urobili: x   Blood: x / Protein: x / Nitrite: x   Leuk Esterase: x / RBC: x / WBC x   Sq Epi: x / Non Sq Epi: x / Bacteria: x            RADIOLOGY & ADDITIONAL STUDIES:

## 2023-10-18 NOTE — CONSULT NOTE ADULT - CONSULT REQUESTED DATE/TIME
16-Oct-2023 22:48
16-Oct-2023
17-Oct-2023 05:43
16-Oct-2023 19:30
17-Oct-2023 09:48
18-Oct-2023 14:56

## 2023-10-18 NOTE — DIETITIAN INITIAL EVALUATION ADULT - PERTINENT LABORATORY DATA
10-18    140  |  102  |  36<H>  ----------------------------<  146<H>  4.1   |  30  |  1.40<H>    Ca    8.1<L>      18 Oct 2023 09:34  Phos  4.7     10-18  Mg     2.4     10-18    TPro  6.5  /  Alb  2.7<L>  /  TBili  0.5  /  DBili  x   /  AST  34  /  ALT  17  /  AlkPhos  65  10-18

## 2023-10-19 NOTE — PROGRESS NOTE ADULT - NSPROGADDITIONALINFOA_GEN_ALL_CORE
GOC - Patient now with suicidal ideation ,case d/w Dr Irwin   - Patient on 1:1, constant observation  - Discussed with HCP daughter Malathi, agrees with continued use of BIPAP Spoke to the daughter Malathi on a phone yesterday and today ,events leading to hospitalization ,current status and poor prognosis discussed .Malathi requests palliative care input and more guidance regarding comfort care .She is aware of overnight events and would like to request hospice consult for inpatient hospice facility placement .Discussed with  Teresita ,palliative care MD followup requested.

## 2023-10-19 NOTE — PROGRESS NOTE ADULT - PROVIDER SPECIALTY LIST ADULT
Infectious Disease
Pulmonology
Pulmonology
Palliative Care
Cardiology
Infectious Disease
Palliative Care
Pulmonology
Hospitalist
Hospitalist

## 2023-10-19 NOTE — PROVIDER CONTACT NOTE (OTHER) - SITUATION
Pt continuously removing BIPAP and stating she doesn't want it. RN educated pt on importance of BIPAP and pt states she understands and want to die.

## 2023-10-19 NOTE — PROGRESS NOTE ADULT - PROBLEM SELECTOR PLAN 4
Acute PNA  COPD Exacerbation  AHRF   Pleural Effusions  - pt p/w acute resp distress, now on BPAP  - imaging reviewed. CT negative for PE w/ R/L sided pleural effusion and diffuse airspace opacities throughout the bilateral lungs.  - cx NGTD  -ID and pulm f/up   C/w zosyn  F/u uPNA Ag  Trend temps/WBC  Maintain aspiration precautions  Diuresis, supplemental O2 and additional management per primary team
Acute PNA  COPD Exacerbation  AHRF   Pleural Effusions  - pt p/w acute resp distress, now on BPAP  - imaging reviewed. CT negative for PE w/ R/L sided pleural effusion and diffuse airspace opacities throughout the bilateral lungs.  - cx NGTD  -ID and pulm f/up   C/w zosyn  F/u uPNA Ag  Trend temps/WBC  Maintain aspiration precautions  Diuresis, supplemental O2 and additional management per primary team

## 2023-10-19 NOTE — CHART NOTE - NSCHARTNOTEFT_GEN_A_CORE
Called by RN as patient appears to be no longer breathing. Patient seen and examined at bedside. Patient did not respond to verbal, physical, or painful stimuli. Absent heart and breath sounds on auscultation. Pupils are fixed and dilated, absent corneal reflex. No palpable pulses at radial, carotid, or femoral arteries.    Time of Death: 13:25pm  Dr. Ferrer notified via phone by RN.  Attempted to call family multiple times but unsuccessful, no voicemail

## 2023-10-19 NOTE — PROGRESS NOTE ADULT - SUBJECTIVE AND OBJECTIVE BOX
CHIEF COMPLAINT/ REASON FOR VISIT  .. Patient was seen to address the  issue listed under PROBLEM LIST which is located toward bottom of this note     JOSE ROY    [facility] [address]    Allergies    No Known Allergies    Intolerances        PAST MEDICAL & SURGICAL HISTORY:  GERD (gastroesophageal reflux disease)      CVA (cerebrovascular accident)      HTN (hypertension)      HLD (hyperlipidemia)      Cognitive communication deficit      Difficulty in walking      Diverticulosis of intestine      Leg edema      Hypothyroidism      Insomnia      MDD (major depressive disorder)      Muscle weakness      Neuralgia and neuritis      Cataract      Pain in right knee      Acute UTI      Urinary tract infection      Urinary incontinence      2019 novel coronavirus disease (COVID-19)      H/O gastroenteritis      History of colitis          FAMILY HISTORY:      Home Medications:      MEDICATIONS  (STANDING):  albuterol/ipratropium for Nebulization 3 milliLiter(s) Nebulizer every 6 hours  dextrose 5% + sodium chloride 0.45%. 1000 milliLiter(s) (60 mL/Hr) IV Continuous <Continuous>  enoxaparin Injectable 30 milliGRAM(s) SubCutaneous every 24 hours  influenza  Vaccine (HIGH DOSE) 0.7 milliLiter(s) IntraMuscular once  lactobacillus acidophilus 1 Tablet(s) Oral every 12 hours  methylPREDNISolone sodium succinate Injectable 40 milliGRAM(s) IV Push daily  mupirocin 2% Nasal 1 Application(s) Both Nostrils every 12 hours  pantoprazole  Injectable 40 milliGRAM(s) IV Push daily  piperacillin/tazobactam IVPB.. 3.375 Gram(s) IV Intermittent every 8 hours    MEDICATIONS  (PRN):  acetaminophen     Tablet .. 650 milliGRAM(s) Oral every 6 hours PRN Temp greater or equal to 38C (100.4F), Mild Pain (1 - 3)  aluminum hydroxide/magnesium hydroxide/simethicone Suspension 30 milliLiter(s) Oral every 4 hours PRN Dyspepsia  melatonin 3 milliGRAM(s) Oral at bedtime PRN Insomnia  midodrine. 5 milliGRAM(s) Oral three times a day PRN for systolic BP<100  ondansetron Injectable 4 milliGRAM(s) IV Push every 8 hours PRN Nausea and/or Vomiting      Diet, NPO:   Except Medications (10-17-23 @ 06:39) [Active]          Vital Signs Last 24 Hrs  T(C): 36.7 (18 Oct 2023 20:31), Max: 36.7 (18 Oct 2023 11:16)  T(F): 98 (18 Oct 2023 20:31), Max: 98 (18 Oct 2023 11:16)  HR: 93 (19 Oct 2023 08:58) (75 - 95)  BP: 93/53 (18 Oct 2023 20:31) (92/52 - 99/60)  BP(mean): --  RR: 16 (18 Oct 2023 20:31) (16 - 19)  SpO2: 91% (19 Oct 2023 08:58) (91% - 100%)    Parameters below as of 19 Oct 2023 08:58  Patient On (Oxygen Delivery Method): BiPAP/CPAP, 100%          10-18-23 @ 07:01  -  10-19-23 @ 07:00  --------------------------------------------------------  IN: 0 mL / OUT: 600 mL / NET: -600 mL              LABS:                        8.8    8.75  )-----------( 186      ( 19 Oct 2023 06:10 )             29.0     10-19    138  |  100  |  42<H>  ----------------------------<  123<H>  3.7   |  30  |  1.20    Ca    8.1<L>      19 Oct 2023 06:10  Phos  3.8     10-19  Mg     2.4     10-18    TPro  6.6  /  Alb  2.5<L>  /  TBili  0.5  /  DBili  x   /  AST  38<H>  /  ALT  21  /  AlkPhos  65  10-19    PT/INR - ( 19 Oct 2023 06:10 )   PT: 16.1 sec;   INR: 1.39 ratio           Urinalysis Basic - ( 19 Oct 2023 06:10 )    Color: x / Appearance: x / SG: x / pH: x  Gluc: 123 mg/dL / Ketone: x  / Bili: x / Urobili: x   Blood: x / Protein: x / Nitrite: x   Leuk Esterase: x / RBC: x / WBC x   Sq Epi: x / Non Sq Epi: x / Bacteria: x        ABG - ( 19 Oct 2023 05:00 )  pH, Arterial: 7.34  pH, Blood: x     /  pCO2: 54    /  pO2: 64    / HCO3: 29    / Base Excess: 3.3   /  SaO2: 92.7                WBC:  WBC Count: 8.75 K/uL (10-19 @ 06:10)  WBC Count: 10.82 K/uL (10-18 @ 06:56)  WBC Count: 5.14 K/uL (10-17 @ 05:43)  WBC Count: 11.15 K/uL (10-16 @ 17:05)      MICROBIOLOGY:  RECENT CULTURES:  10-16 Clean Catch Clean Catch (Midstream) XXXX XXXX   No growth    10-16 .Blood Blood-Peripheral XXXX XXXX   No growth at 48 Hours    10-16 .Blood Blood-Peripheral XXXX XXXX   No growth at 48 Hours                PT/INR - ( 19 Oct 2023 06:10 )   PT: 16.1 sec;   INR: 1.39 ratio             Sodium:  Sodium: 138 mmol/L (10-19 @ 06:10)  Sodium: 140 mmol/L (10-18 @ 09:34)  Sodium: 138 mmol/L (10-17 @ 05:43)  Sodium: 137 mmol/L (10-16 @ 17:05)      1.20 mg/dL 10-19 @ 06:10  1.40 mg/dL 10-18 @ 09:34  0.98 mg/dL 10-17 @ 05:43  0.95 mg/dL 10-16 @ 17:05      Hemoglobin:  Hemoglobin: 8.8 g/dL (10-19 @ 06:10)  Hemoglobin: 10.1 g/dL (10-18 @ 06:56)  Hemoglobin: 10.3 g/dL (10-17 @ 05:43)  Hemoglobin: 12.8 g/dL (10-16 @ 17:05)      Platelets: Platelet Count - Automated: 186 K/uL (10-19 @ 06:10)  Platelet Count - Automated: 221 K/uL (10-18 @ 06:56)  Platelet Count - Automated: 210 K/uL (10-17 @ 05:43)  Platelet Count - Automated: 298 K/uL (10-16 @ 17:05)      LIVER FUNCTIONS - ( 19 Oct 2023 06:10 )  Alb: 2.5 g/dL / Pro: 6.6 g/dL / ALK PHOS: 65 U/L / ALT: 21 U/L / AST: 38 U/L / GGT: x             Urinalysis Basic - ( 19 Oct 2023 06:10 )    Color: x / Appearance: x / SG: x / pH: x  Gluc: 123 mg/dL / Ketone: x  / Bili: x / Urobili: x   Blood: x / Protein: x / Nitrite: x   Leuk Esterase: x / RBC: x / WBC x   Sq Epi: x / Non Sq Epi: x / Bacteria: x        RADIOLOGY & ADDITIONAL STUDIES:      MICROBIOLOGY:  RECENT CULTURES:  10-16 Clean Catch Clean Catch (Midstream) XXXX XXXX   No growth    10-16 .Blood Blood-Peripheral XXXX XXXX   No growth at 48 Hours    10-16 .Blood Blood-Peripheral XXXX XXXX   No growth at 48 Hours

## 2023-10-19 NOTE — DISCHARGE NOTE FOR THE EXPIRED PATIENT - REASON FOR ADMISSION
Patient calling asking to speak to dr Powell's medical assistant-Elsa.  She stated that she had a form that she needed Dr Powell to sign and she wanted to explain it to Elsa. Patient advised that medical assistant was with patients all day.  Patient suggested that maybe she should just drop by and wait for Elsa to become available-she was discouraged from doing this and advised instead to leave a message.  So patient is leaving a message for Elsa to call her at the above phone number as soon as possible to discuss.  Thank you   SOB [facility] HYPOXIA

## 2023-10-19 NOTE — PROGRESS NOTE ADULT - ASSESSMENT
REASON FOR VISIT  .. Management of problems listed below      ROS  . Patient unable to give ROS     PHYSICAL EXAM    HEENT Unremarkable  atraumatic   RESP Fair air entry  Harsh breath sound   CARDIAC S1 S2 No S3     NO JVD    ABDOMEN No hepatosplenomegaly   PEDAL EDEMA present No calf tenderness  NO rash       GENERAL DATA .     GOC.   .. 10/17/2023 dnr dni    .. 10/16/2023 full code   ICU STAY. .. none  COVID. ..  scv2 10/16/2023 (-)   BEST PRACTICE ISSUES.    HOB ELEVATN. .. Yes  DVT PPLX. ..      SPEECH SWALLOW RECOMMENDATIONS.    ..      10/16/2023 speech eval   DIET.  ..  10/16/2023 npo   IV fl ...   STRESS ULCER PPLX. .. 10/16/2023 protonix       INFECTION PPLX. ..     ALLGY. ..  unknown                       WT. ..  10/16/2023 56  BMI. ..      PROCEDURES.  ..     ABGS.   . 10/17/2023 5a 12/8/50% 733/56/124   . 10/16/2023 8p bpap 100% 12/8 734/58/232  . 10/16/2023 5p 732/60/121     VS/ PO/IO/ VENT/ DRIPS.   10/17/2023 afeb 69 117/60   10/17/2023 12/8/16/.5     DOA C/C.     . 10/16/2023 From [facility]. increased SOB.  NOted DNR/DNI.  noted pulse ox on R/A on arrival of EMS 74%   INITIAL PRESENTATION.   . 10/16/2023 89 y/o Female with PMH of HTN, HLD, CVA (w/ Residual ), Depression, GERD presents to [facility] ED BIBA from [facility] complaining of shortness of breath with:   . On arrival to ED, patient in significant respiratory distress, hypoxic to 70s. Placed on NIPPV with significant improvement in oxygenation. CXR on admission with B/L hazy infiltrates, likely component of acute pulmonary edema.  . Ddimer negative, with absent tachycardia and hypotension. Low likelihood.   . Moderate right pleural effusion and small left pleural effusion with associated atelectasis.   . Patient does not endorse chest pain/ pressure. Troponin negative x1,  . 10/16/2023 Pulm consultd   er MANAGEMENT   .. 10/16/2023 7p lasix 40zosyn  vanco   .. 10/16/2023 9p duoneb       PMH.   HOME MEDS.   COURSE.     PROBLEMS/ASSESSMENT/RECOMMENDATIONS (A/R).  PULM.  . RO VTE   .. v duplx 10/16/2023 (-)  .. ct cap ic 10/16/2023  no pe   .. No pe  .. started dvt pplx    . Pl effsn  .. ct cap ic 10/16/2023   .... no pe   .... mod r effs  sm l effsn   .. aewait echo   .. Will plan thoracentesis  lissette if not chf    INFECTN  . Pneum   .. W 10/16-10/17/2023 w 11.1- 5.1    .. pr 10/17/2023 pr .51   .. ua 10/16/2023 w 8 bact few   .. ct cap ic 10/16/2023   .... no pe   .... mod r effs  sm l effsn   .... diffuse airspace opac bl   .... 3.2 cm r adnexal cyst   .. flu ab 10/16/2023 (-)   .. rsv 10/16/2023 (-)   .. 10/16/2023 continue zosyn  .. 10/16/2023 check legn procalc mrsa strep pneu    CARDIAC.  . CHF.  .. bnp 10/16-10/17/2023 bnp 1328 - 1846   .. given lasix   .. 10/16/2023 check tte    . RO MI.   .. Tr 10/16-10/16/2023 Tr 18-21   .. monitor     HEMAT.  .. Hb 10/16-10/17/2023 Hb 12.8 - 10.3   .. monitor     GI.  . GERD   .. ppi     RENAL.  .. Na 10/16/2023 Na 137   .. Co2 10/16/2023 co2 33  .. cr 10/16/2023 cr .9   .. monitor     SUMMARY.  . 89 f MH of HTN, HLD, CVA (w/ Residual ), Depression, GERD presents to  ED BIBA from [facility] complaining of shortness of breath  admitted 10/16/2023 with hypoxic resp failure pneum chf     MAIN ISSUES.  . Acute hypercapnic  Respiratory Failure 10/16/2023 BPAP startd    . 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 VTE ruled out  10/16 V duplx and CTA (-)   . Pl effsn 10/16/2023 will plan diagn thora  . Hypothermia 10/16/2023 Check tsh cortisol  . Pneumonia 10/16/2023 Cont zosyn check mrsa legn bc uc   . Acute Pulmonary Edema 10/16/2023 Given lasix Check tte      TIME SPENT.   . Over 36 minutes aggregate care time spent on encounter; activities included   direct patient care, counseling and/or coordinating care reviewing notes, lab data/ imaging , discussion with multidisciplinary team/ patient  /family and explaining in detail risks, benefits, alternatives  of the recommendations     MANUELA GARCIA 89 f 10/16/2023 1/3/1928 DR JEREMY MOCTEZUMA     
89 y/o Female with PMH of HTN, HLD, CVA (w/ Residual ), Depression, GERD presents to  ED [facility] from [facility] complaining of shortness of breath.  On arrival to ED, patient in significant respiratory distress, hypoxic to 70s. Placed on NC with minimal improvement and work of breathing. Transitioned to NIPPV with significant improvement in oxygenation.   CXR on admission with B/L hazy infiltrates, likely component of acute pulmonary edema.Given profound hypoxia, concern for possible PE. Ddimer negative, with absent tachycardia and hypotension. Low likelihood. CT Angio ordered, with No pulmonary embolism., Moderate right pleural effusion and small left pleural effusion with associated atelectasis. Diffuse airspace opacities throughout the bilateral lungs.   ID c/s for possible PNA    Acute PNA  COPD Exacerbation  AHRF   Pleural Effusions  - pt p/w acute resp distress, now on BPAP  - imaging reviewed. CT negative for PE w/ R/L sided pleural effusion and diffuse airspace opacities throughout the bilateral lungs.  - cx NGTD  Recommendations:   C/w zosyn  F/u uPNA Ag  Trend temps/WBC  Maintain aspiration precautions  Diuresis, supplemental O2 and additional management per primary team    Infectious Diseases will continue to follow. Please call with any questions.   Soumya Cleveland M.D.  [facility] [phone]  For after 5 P.M. and weekends, please call [phone]  
  REASON FOR VISIT  .. Management of problems listed below      ROS  . Patient unable to give ROS     PHYSICAL EXAM    HEENT Unremarkable  atraumatic   RESP Fair air entry  Harsh breath sound   CARDIAC S1 S2 No S3     NO JVD    ABDOMEN No hepatosplenomegaly   PEDAL EDEMA present No calf tenderness  NO rash       GENERAL DATA .     GOC.   .. 10/17/2023 dnr dni    .. 10/16/2023 full code   ICU STAY. .. none  COVID. ..  scv2 10/16/2023 (-)   BEST PRACTICE ISSUES.    HOB ELEVATN. .. Yes  DVT PPLX. ..   10/16 lvnx 30    SPEECH SWALLOW RECOMMENDATIONS.    ..      10/16/2023 speech eval   DIET.  ..  10/16/2023 npo   IV fl ... 10/18/2023 1/2 ns 40   STRESS ULCER PPLX. .. 10/16/2023 protonix       INFECTION PPLX. ..     ALLGY. ..  unknown                       WT. ..  10/16/2023 56  BMI. ..      PROCEDURES.    ABGS.   . 10/19/2023 bpap 12/8/70 734/54/64   . 10/18/2023 5a bpap 12/8/100 730/62/83   . 10/17/2023 5a 12/8/50% 733/56/124   . 10/16/2023 8p bpap 100% 12/8 734/58/232  . 10/16/2023 5p 732/60/121     VS/ PO/IO/ VENT/ DRIPS.   10/19/2023 10/19 bpap 12/8/16/100   10/18/2023 afeb 75 90/50   10/18/2023 12/8/16/100    SUMMARY.  . 89 f MH of HTN, HLD, CVA (w/ Residual ), Depression, GERD presents to  ED BIBA from [facility] complaining of shortness of breath  admitted 10/16/2023 with hypoxic resp failure pneum chf   . 10/18/2023 On vancozosyn On bpap 100% fio2     MAIN ISSUES.  . Acute hypercapnic  Respiratory Failure 10/16/2023 BPAP startd   .. 10/18/2023 requiring 100% oxygen    . 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 VTE ruled out  10/16 V duplx and CTA (-)   . Pl effsn 10/16/2023 will plan diagn thora  . Hypothermia 10/16/2023 Check tsh cortisol  . Pneumonia 10/16/2023 Cont zosyn mrsa (+) 10/18 one dose vanco given check legn bc uc   . Acute Pulmonary Edema 10/16/2023 Given lasix Check tte  . TRENA 10/18/2023 Cr 1.4  . Prognosis poor  . GOC  Family leaning toward cmo    TIME SPENT.   . Over 36 minutes aggregate care time spent on encounter; activities included   direct patient care, counseling and/or coordinating care reviewing notes, lab data/ imaging , discussion with multidisciplinary team/ patient  /family and explaining in detail risks, benefits, alternatives  of the recommendations         MANUELA GARCIA 89 f 10/16/2023 1/3/1928 DR JEREMY MOCTEZUMA   
  REASON FOR VISIT  .. Management of problems listed below      ROS  . Patient unable to give ROS     PHYSICAL EXAM    HEENT Unremarkable  atraumatic   RESP Fair air entry  Harsh breath sound   CARDIAC S1 S2 No S3     NO JVD    ABDOMEN No hepatosplenomegaly   PEDAL EDEMA present No calf tenderness  NO rash       GENERAL DATA .     GOC.   .. 10/17/2023 dnr dni    .. 10/16/2023 full code   ICU STAY. .. none  COVID. ..  scv2 10/16/2023 (-)   BEST PRACTICE ISSUES.    HOB ELEVATN. .. Yes  DVT PPLX. ..   10/16 lvnx 30    SPEECH SWALLOW RECOMMENDATIONS.    ..      10/16/2023 speech eval   DIET.  ..  10/16/2023 npo   IV fl ... 10/18/2023 1/2 ns 40   STRESS ULCER PPLX. .. 10/16/2023 protonix       INFECTION PPLX. ..     ALLGY. ..  unknown                       WT. ..  10/16/2023 56  BMI. ..      PROCEDURES.  ..     ABGS.   . 10/18/2023 5a bpap 12/8/100 730/62/83   . 10/17/2023 5a 12/8/50% 733/56/124   . 10/16/2023 8p bpap 100% 12/8 734/58/232  . 10/16/2023 5p 732/60/121     VS/ PO/IO/ VENT/ DRIPS.   10/18/2023 afeb 75 90/50   10/18/2023 12/8/16/100    DOA C/C.     . 10/16/2023 From [facility]. increased SOB.  NOted DNR/DNI.  noted pulse ox on R/A on arrival of EMS 74%   INITIAL PRESENTATION.   . 10/16/2023 89 y/o Female with PMH of HTN, HLD, CVA (w/ Residual ), Depression, GERD presents to  ED BIBA from [facility] complaining of shortness of breath with:   . On arrival to ED, patient in significant respiratory distress, hypoxic to 70s. Placed on NIPPV with significant improvement in oxygenation. CXR on admission with B/L hazy infiltrates, likely component of acute pulmonary edema.  . Ddimer negative, with absent tachycardia and hypotension. Low likelihood.   . Moderate right pleural effusion and small left pleural effusion with associated atelectasis.   . Patient does not endorse chest pain/ pressure. Troponin negative x1,  . 10/16/2023 Pulm consultd   er MANAGEMENT   .. 10/16/2023 7p lasix 40zosyn  vanco   .. 10/16/2023 9p duoneb       PMH.   HOME MEDS.   COURSE.     PROBLEMS/ASSESSMENT/RECOMMENDATIONS (A/R).  PULM.  . RO VTE   .. v duplx 10/16/2023 (-)  .. ct cap ic 10/16/2023  no pe   .. No pe  .. started dvt pplx    . Resp failure   .. 10/18/2023 Now on 100% oxygen     . Pl effsn  .. ct cap ic 10/16/2023   .... no pe   .... mod r effs  sm l effsn   .. aewait echo   .. Will plan thoracentesis  lissette if not chf    INFECTN  . Pneum   .. W 10/16-10/17-10/18/2023 w 11.1- 5.1 - 10.8   .. pr 10/17-10/18/2023 pr .51 - 2.8   .. ua 10/16/2023 w 8 bact few   .. ct cap ic 10/16/2023   .... no pe   .... mod r effs  sm l effsn   .... diffuse airspace opac bl   .... 3.2 cm r adnexal cyst   .. rvp 10/18/2023 (-)   .. flu ab 10/16/2023 (-)   .. rsv 10/16/2023 (-)   .. strep pneu ag 10/18/2023 (-)   .. mrsa 10/18/2023 (+)   .. uc 10/16 (-)   .. bc 10/16 (-)   .. 10/16/2023 continue zosyn  .. 10/18/2023 dw ID one dose vanco given   .. 10/16/2023 check legn procalc mrsa strep pneu    CARDIAC.  . CHF.  .. bnp 10/16-10/17/2023 bnp 1328 - 1846   .. given lasix   .. 10/16/2023 check tte    . RO MI.   .. Tr 10/18-10/18 Tr 210-221   .. Tr 10/16-10/16/2023 Tr 18-21   .. monitor     HEMAT.  .. Hb 10/16-10/17/2023 Hb 12.8 - 10.3   .. monitor     GI.  . GERD   .. ppi     RENAL.  .. Na 10/16/2023 Na 137   .. Co2 10/16-10/18/2023 co2 33- 30  .. cr 10/16-10/18/2023 cr .9 - 1.4  .. monitor     SUMMARY.  . 89 f MH of HTN, HLD, CVA (w/ Residual ), Depression, GERD presents to [facility] ED BIBA from [facility] complaining of shortness of breath  admitted 10/16/2023 with hypoxic resp failure pneum chf   . 10/18/2023 On vancozosyn On bpap 100% fio2     MAIN ISSUES.  . Acute hypercapnic  Respiratory Failure 10/16/2023 BPAP startd   .. 10/18/2023 requiring 100% oxygen    . 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 VTE ruled out  10/16 V duplx and CTA (-)   . Pl effsn 10/16/2023 will plan diagn thora  . Hypothermia 10/16/2023 Check tsh cortisol  . Pneumonia 10/16/2023 Cont zosyn mrsa (+) 10/18 one dose vanco given check legn bc uc   . Acute Pulmonary Edema 10/16/2023 Given lasix Check tte  . TRENA 10/18/2023 Cr 1.4      TIME SPENT.   . Over 36 minutes aggregate care time spent on encounter; activities included   direct patient care, counseling and/or coordinating care reviewing notes, lab data/ imaging , discussion with multidisciplinary team/ patient  /family and explaining in detail risks, benefits, alternatives  of the recommendations     MANUELA GARCIA 89 f 10/16/2023 1/3/1928 DR JEREMY MOCTEZUMA   
89 y/o Female with PMH of HTN, HLD, CVA (w/ Residual ), Depression, GERD presents to  ED BIBA from SNF complaining of shortness of breath.  On arrival to ED, patient in significant respiratory distress, hypoxic to 70s. Placed on NC with minimal improvement and work of breathing. Transitioned to NIPPV with significant improvement in oxygenation.   CXR on admission with B/L hazy infiltrates, likely component of acute pulmonary edema.Given profound hypoxia, concern for possible PE. Ddimer negative, with absent tachycardia and hypotension. Low likelihood. CT Angio ordered, with No pulmonary embolism., Moderate right pleural effusion and small left pleural effusion with associated atelectasis. Diffuse airspace opacities throughout the bilateral lungs.   ID c/s for possible PNA    Acute PNA w/ pleural effusions  COPD Exacerbation  AHRF on BPAP  - pt p/w acute resp distress, now on BPAP  - imaging reviewed. CT negative for PE w/ R/L sided pleural effusion and diffuse airspace opacities throughout the bilateral lungs.  - BCx/UCx NGTD, uPNA AG negative  - +MRSA  Recommendations:   C/w zosyn  Vancomycin added in setting of +MRSA  Trend temps/WBC  Maintain aspiration precautions  Diuresis, supplemental O2 and additional management per primary team    Poor prognosis  GOC per primary team    Infectious Diseases will continue to follow. Please call with any questions.   Soumya Cleveland M.D.  [facility] Division of Infectious Diseases [phone]  For after 5 P.M. and weekends, please call [phone]  
89 yo F with acute mixed resp. failure due to suspected BL PNA, contribution of pulmonary edema with BL pl. effusion    dtr patience   hcp  molst filled out  signed  updated  reviewed  dnr dni  limited intervention   no peg    ID eval noted  on ABX  on IVF  vs noted  labs reviewed  pulm following
89 yo F with acute mixed resp. failure due to suspected BL PNA, contribution of pulmonary edema with BL pl. effusion    dtr patience   hcp  molst filled out  signed  updated  reviewed  dnr dni  limited intervention   no peg    overnight events noted - suicidal ideation ? - NIV use difficulty - will f/u with family - pt is DNR DNI    ID eval noted  on ABX  on IVF  vs noted  labs reviewed  pulm following
89 y/o Female with PMH of HTN, HLD, CVA (w/ Residual ), Depression, GERD presents to  ED BIBA from SNF complaining of shortness of breath ,diagnosed with   1. Acute Hypoxic Respiratory Failure with hypoxia and hypercapnia   2. Acute Pulmonary Edema   3. Pleural Effusions  4. COPD exacerbation Admitted  to telemetry unit for monitoring , send 3 sets of cardiac enzymes to rule out acute coronary event, obtain ECHO to evaluate LVEF, cardiology consult  ,continue current management, O2 supply, anticoagulation plan as per cardiology consult Seen by ICU in ER -didnt require icu admission , seen by pulm /intensivnist and cardiology on admission  -On arrival to ED, patient in significant respiratory distress, hypoxic to 70s. Placed on NC with minimal improvement and work of breathing. Transitioned to NIPPV with significant improvement in oxygenation. CXR on admission with B/L hazy infiltrates, likely component of acute pulmonary edema. Recommend diuresis with Lasix. Order TTE, with Cardiology follow up in AM. Clinically with significant improvement in respiratory status. Palliative care consult requested ,to discuss advance directives and complete MOLST -DNR,DNI ,NO PEG ,LIMITED MED INTERVENTIONS   -Given profound hypoxia, concern for possible PE. Ddimer negative, with absent tachycardia and hypotension. Low likelihood. CT Angio ordered, with No pulmonary embolism., Moderate right pleural effusion and small left pleural effusion with associated atelectasis. Diffuse airspace opacities throughout the bilateral lungs. US Duplex B/L LE pending.   -No overt s/s of underlying cardiac event. Patient does not endorse chest pain/ pressure. Troponin negative x1, repeat pending. EKG on admission with no acute pathology.   -Respiratory status likely multifactorial in setting of acute pulmonary edema v pneumonia. CT chest with diffuse opacities in B/L lungs, concerning for underlying infection. s/p Vancomycin and Zosyn in ED. However, given possibility of superimposed infection would continue empiric coverage with Zosyn. Recommend BCx, SCx, UA, UCx, Legionella and Strep Pneumo.   
89 y/o Female with PMH of HTN, HLD, CVA (w/ Residual ), Depression, GERD presents to  ED BIBA from SNF complaining of shortness of breath ,diagnosed with   1. Acute Hypoxic Respiratory Failure with hypoxia and hypercapnia   2. Acute Pulmonary Edema   3. Pleural Effusions  4. COPD exacerbation Admitted  to telemetry unit for monitoring , send 3 sets of cardiac enzymes to rule out acute coronary event, obtain ECHO to evaluate LVEF, cardiology consult  ,continue current management, O2 supply, anticoagulation plan as per cardiology consult Seen by ICU in ER -didnt require icu admission , seen by pulm /intensivnist and cardiology on admission  -On arrival to ED, patient in significant respiratory distress, hypoxic to 70s. Placed on NC with minimal improvement and work of breathing. Transitioned to NIPPV with significant improvement in oxygenation. CXR on admission with B/L hazy infiltrates, likely component of acute pulmonary edema. Recommend diuresis with Lasix. Order TTE, with Cardiology follow up in AM. Clinically with significant improvement in respiratory status. Palliative care consult requested ,to discuss advance directives and complete MOLST -DNR,DNI ,NO PEG ,LIMITED MED INTERVENTIONS   -Given profound hypoxia, concern for possible PE. Ddimer negative, with absent tachycardia and hypotension. Low likelihood. CT Angio ordered, with No pulmonary embolism., Moderate right pleural effusion and small left pleural effusion with associated atelectasis. Diffuse airspace opacities throughout the bilateral lungs. US Duplex B/L LE pending.   -No overt s/s of underlying cardiac event. Patient does not endorse chest pain/ pressure. Troponin negative x1, repeat pending. EKG on admission with no acute pathology.   -Respiratory status likely multifactorial in setting of acute pulmonary edema v pneumonia. CT chest with diffuse opacities in B/L lungs, concerning for underlying infection. s/p Vancomycin and Zosyn in ED. However, given possibility of superimposed infection would continue empiric coverage with Zosyn. Recommend BCx, SCx, UA, UCx, Legionella and Strep Pneumo.

## 2023-10-19 NOTE — PROGRESS NOTE ADULT - SUBJECTIVE AND OBJECTIVE BOX
[facility]       HPI:  89 y/o Female with PMH of HTN, HLD, CVA (w/ Residual ), Depression, GERD presents to  ED BIBA from SNF complaining of shortness of breath ,diagnosed with   1. Acute Hypoxic Respiratory Failure with hypoxia and hypercapnia   2. Acute Pulmonary Edema   3. Pleural Effusions  4. COPD exacerbation Admitted  to telemetry unit for monitoring , send 3 sets of cardiac enzymes to rule out acute coronary event, obtain ECHO to evaluate LVEF, cardiology consult  ,continue current management, O2 supply, anticoagulation plan as per cardiology consult Seen by ICU in ER -didnt require icu admission , seen by pulm /intensivnist and cardiology on admission  -On arrival to ED, patient in significant respiratory distress, hypoxic to 70s. Placed on NC with minimal improvement and work of breathing. Transitioned to NIPPV with significant improvement in oxygenation. CXR on admission with B/L hazy infiltrates, likely component of acute pulmonary edema. Recommend diuresis with Lasix. Order TTE, with Cardiology follow up in AM. Clinically with significant improvement in respiratory status. Palliative care consult requested ,to discuss advance directives and complete MOLST -DNR,DNI ,NO PEG ,LIMITED MED INTERVENTIONS   -Given profound hypoxia, concern for possible PE. Ddimer negative, with absent tachycardia and hypotension. Low likelihood. CT Angio ordered, with No pulmonary embolism., Moderate right pleural effusion and small left pleural effusion with associated atelectasis. Diffuse airspace opacities throughout the bilateral lungs. US Duplex B/L LE pending.   -No overt s/s of underlying cardiac event. Patient does not endorse chest pain/ pressure. Troponin negative x1, repeat pending. EKG on admission with no acute pathology.   -Respiratory status likely multifactorial in setting of acute pulmonary edema v pneumonia. CT chest with diffuse opacities in B/L lungs, concerning for underlying infection. s/p Vancomycin and Zosyn in ED. However, given possibility of superimposed infection would continue empiric coverage with Zosyn. Recommend BCx, SCx, UA, UCx, Legionella and Strep Pneumo.  (17 Oct 2023 06:48)        SUBJECTIVE:      ALLERGIES:  Allergies    No Known Allergies    Intolerances          MEDICATIONS  (STANDING):  albuterol/ipratropium for Nebulization 3 milliLiter(s) Nebulizer every 6 hours  enoxaparin Injectable 30 milliGRAM(s) SubCutaneous every 24 hours  influenza  Vaccine (HIGH DOSE) 0.7 milliLiter(s) IntraMuscular once  lactobacillus acidophilus 1 Tablet(s) Oral every 12 hours  methylPREDNISolone sodium succinate Injectable 40 milliGRAM(s) IV Push daily  mupirocin 2% Nasal 1 Application(s) Both Nostrils every 12 hours  pantoprazole  Injectable 40 milliGRAM(s) IV Push daily  piperacillin/tazobactam IVPB.. 3.375 Gram(s) IV Intermittent every 8 hours  sodium chloride 0.45%. 1000 milliLiter(s) (40 mL/Hr) IV Continuous <Continuous>    MEDICATIONS  (PRN):  acetaminophen     Tablet .. 650 milliGRAM(s) Oral every 6 hours PRN Temp greater or equal to 38C (100.4F), Mild Pain (1 - 3)  aluminum hydroxide/magnesium hydroxide/simethicone Suspension 30 milliLiter(s) Oral every 4 hours PRN Dyspepsia  melatonin 3 milliGRAM(s) Oral at bedtime PRN Insomnia  midodrine. 5 milliGRAM(s) Oral three times a day PRN for systolic BP<100  ondansetron Injectable 4 milliGRAM(s) IV Push every 8 hours PRN Nausea and/or Vomiting      REVIEW OF SYSTEMS:  CONSTITUTIONAL: No fever,  RESPIRATORY: No cough, wheezing, shortness of breath  CARDIOVASCULAR: No chest pain, dyspnea, palpitations, dizziness, syncope, paroxysmal nocturnal dyspnea, orthopnea, or arm or leg swelling  GASTROINTESTINAL: No abdominal  or epigastric pain, nausea, vomiting,  diarrhea  NEUROLOGICAL: No headaches,  loss of strength, numbness, or tremors    Vital Signs Last 24 Hrs  T(C): 36.7 (18 Oct 2023 20:31), Max: 36.7 (18 Oct 2023 11:16)  T(F): 98 (18 Oct 2023 20:31), Max: 98 (18 Oct 2023 11:16)  HR: 83 (19 Oct 2023 05:10) (75 - 83)  BP: 93/53 (18 Oct 2023 20:31) (92/52 - 99/60)  BP(mean): --  RR: 16 (18 Oct 2023 20:31) (16 - 19)  SpO2: 92% (19 Oct 2023 05:10) (92% - 100%)    Parameters below as of 19 Oct 2023 01:08  Patient On (Oxygen Delivery Method): ventilator, 100%        PHYSICAL EXAM:  HEAD:  Atraumatic, Normocephalic  NECK: Supple and normal thyroid.  No JVD or carotid bruit.   HEART: S1, S2 regular , 1/6 soft ejection systolic murmur in mitral area , no thrill and no gallops .  PULMONARY: Bilateral vesicular breathing , few scattered ronchi and few scattered rales are present .  ABDOMEN: Soft nontender and positive bowl sounds   EXTREMITIES:  No clubbing, cyanosis, or pedal  edema  NEUROLOGICAL: AAOX3 , no focal deficit .    LABS:                        10.1   10.82 )-----------( 221      ( 18 Oct 2023 06:56 )             33.1     10-18    140  |  102  |  36<H>  ----------------------------<  146<H>  4.1   |  30  |  1.40<H>    Ca    8.1<L>      18 Oct 2023 09:34  Phos  4.9     10-18  Mg     2.4     10-18    TPro  6.5  /  Alb  2.7<L>  /  TBili  0.5  /  DBili  x   /  AST  34  /  ALT  17  /  AlkPhos  65  10-18        PT/INR - ( 18 Oct 2023 09:34 )   PT: 13.1 sec;   INR: 1.12 ratio           Urinalysis Basic - ( 18 Oct 2023 09:34 )    Color: x / Appearance: x / SG: x / pH: x  Gluc: 146 mg/dL / Ketone: x  / Bili: x / Urobili: x   Blood: x / Protein: x / Nitrite: x   Leuk Esterase: x / RBC: x / WBC x   Sq Epi: x / Non Sq Epi: x / Bacteria: x      BNP      EKG:  ECHO:  IMAGING:    Assessment/Plan    Will continue to follow during hospital course and give further recommendations as needed . Thanks for your referral . if any questions please contact me at office [phone] [phone])  VAZQUEZ SWENSON MD [facility]  [address] [address]  [address]  OFFICE : [phone]  CELL : [phone]  CARDIOLOGY F/U  :       HPI:  89 y/o Female with PMH of HTN, HLD, CVA (w/ Residual ), Depression, GERD presents to  ED BIBA from SNF complaining of shortness of breath ,diagnosed with   1. Acute Hypoxic Respiratory Failure with hypoxia and hypercapnia   2. Acute Pulmonary Edema   3. Pleural Effusions  4. COPD exacerbation Admitted  to telemetry unit for monitoring , send 3 sets of cardiac enzymes to rule out acute coronary event, obtain ECHO to evaluate LVEF, cardiology consult  ,continue current management, O2 supply, anticoagulation plan as per cardiology consult Seen by ICU in ER -didnt require icu admission , seen by pulm /intensivnist and cardiology on admission  -On arrival to ED, patient in significant respiratory distress, hypoxic to 70s. Placed on NC with minimal improvement and work of breathing. Transitioned to NIPPV with significant improvement in oxygenation. CXR on admission with B/L hazy infiltrates, likely component of acute pulmonary edema. Recommend diuresis with Lasix. Order TTE, with Cardiology follow up in AM. Clinically with significant improvement in respiratory status. Palliative care consult requested ,to discuss advance directives and complete MOLST -DNR,DNI ,NO PEG ,LIMITED MED INTERVENTIONS   -Given profound hypoxia, concern for possible PE. Ddimer negative, with absent tachycardia and hypotension. Low likelihood. CT Angio ordered, with No pulmonary embolism., Moderate right pleural effusion and small left pleural effusion with associated atelectasis. Diffuse airspace opacities throughout the bilateral lungs. US Duplex B/L LE pending.   -No overt s/s of underlying cardiac event. Patient does not endorse chest pain/ pressure. Troponin negative x1, repeat pending. EKG on admission with no acute pathology.   -Respiratory status likely multifactorial in setting of acute pulmonary edema v pneumonia. CT chest with diffuse opacities in B/L lungs, concerning for underlying infection. s/p Vancomycin and Zosyn in ED. However, given possibility of superimposed infection would continue empiric coverage with Zosyn. Recommend BCx, SCx, UA, UCx, Legionella and Strep Pneumo.  (17 Oct 2023 06:48)        SUBJECTIVE: Patient still SOB and on Bi-pap       ALLERGIES:  Allergies    No Known Allergies    Intolerances          MEDICATIONS  (STANDING):  albuterol/ipratropium for Nebulization 3 milliLiter(s) Nebulizer every 6 hours  enoxaparin Injectable 30 milliGRAM(s) SubCutaneous every 24 hours  influenza  Vaccine (HIGH DOSE) 0.7 milliLiter(s) IntraMuscular once  lactobacillus acidophilus 1 Tablet(s) Oral every 12 hours  methylPREDNISolone sodium succinate Injectable 40 milliGRAM(s) IV Push daily  mupirocin 2% Nasal 1 Application(s) Both Nostrils every 12 hours  pantoprazole  Injectable 40 milliGRAM(s) IV Push daily  piperacillin/tazobactam IVPB.. 3.375 Gram(s) IV Intermittent every 8 hours  sodium chloride 0.45%. 1000 milliLiter(s) (40 mL/Hr) IV Continuous <Continuous>    MEDICATIONS  (PRN):  acetaminophen     Tablet .. 650 milliGRAM(s) Oral every 6 hours PRN Temp greater or equal to 38C (100.4F), Mild Pain (1 - 3)  aluminum hydroxide/magnesium hydroxide/simethicone Suspension 30 milliLiter(s) Oral every 4 hours PRN Dyspepsia  melatonin 3 milliGRAM(s) Oral at bedtime PRN Insomnia  midodrine. 5 milliGRAM(s) Oral three times a day PRN for systolic BP<100  ondansetron Injectable 4 milliGRAM(s) IV Push every 8 hours PRN Nausea and/or Vomiting      REVIEW OF SYSTEMS:  CONSTITUTIONAL: No fever,  RESPIRATORY: Patient has  cough, wheezing, shortness of breath  CARDIOVASCULAR: No chest pain, , palpitations, dizziness, syncope, paroxysmal nocturnal dyspnea,  or arm or leg swelling but has SOB   GASTROINTESTINAL: No abdominal  or epigastric pain, nausea, vomiting,  diarrhea  NEUROLOGICAL: No headaches, numbness, or tremors  Skin : No itching .  Hematology : No active bleeding .  Endocrinology : No heat and cold intolerance .  Psychiatry : Patient is confused .  Genitourinary : NO dysuria .  Musculoskeletal : Patient has mild arthritis     Vital Signs Last 24 Hrs  T(C): 36.7 (18 Oct 2023 20:31), Max: 36.7 (18 Oct 2023 11:16)  T(F): 98 (18 Oct 2023 20:31), Max: 98 (18 Oct 2023 11:16)  HR: 83 (19 Oct 2023 05:10) (75 - 83)  BP: 93/53 (18 Oct 2023 20:31) (92/52 - 99/60)  BP(mean): --  RR: 16 (18 Oct 2023 20:31) (16 - 19)  SpO2: 92% (19 Oct 2023 05:10) (92% - 100%)    Parameters below as of 19 Oct 2023 01:08  Patient On (Oxygen Delivery Method): ventilator, 100%        PHYSICAL EXAM:  HEAD:  Atraumatic, Normocephalic  NECK: Supple and normal thyroid.  No JVD or carotid bruit.   HEART: S1, S2 regular , 1/6 soft ejection systolic murmur in mitral area , no thrill and no gallops .  PULMONARY: Bilateral vesicular breathing , few scattered rhonchi and few scattered rales are present .  ABDOMEN: Soft nontender and positive bowl sounds   EXTREMITIES:  No clubbing, cyanosis, or pedal  edema  NEUROLOGICAL: AA and confused  .  Skin : No rashes .  Musculoskeletal : No joint swellings .    LABS:                        10.1   10.82 )-----------( 221      ( 18 Oct 2023 06:56 )             33.1     10-18    140  |  102  |  36<H>  ----------------------------<  146<H>  4.1   |  30  |  1.40<H>    Ca    8.1<L>      18 Oct 2023 09:34  Phos  4.9     10-18  Mg     2.4     10-18    TPro  6.5  /  Alb  2.7<L>  /  TBili  0.5  /  DBili  x   /  AST  34  /  ALT  17  /  AlkPhos  65  10-18        PT/INR - ( 18 Oct 2023 09:34 )   PT: 13.1 sec;   INR: 1.12 ratio           Urinalysis Basic - ( 18 Oct 2023 09:34 )    Color: x / Appearance: x / SG: x / pH: x  Gluc: 146 mg/dL / Ketone: x  / Bili: x / Urobili: x   Blood: x / Protein: x / Nitrite: x   Leuk Esterase: x / RBC: x / WBC x   Sq Epi: x / Non Sq Epi: x / Bacteria: x    ECHO : NORMAL LV SYSTOLIC FUNCTION WITH LV EF 55%  MODERATE PULMONARY HYPERTENSION .  MODERATE AORTIC STENOSIS   MODERATE MR     Assessment/Plan  Patient has :  1) Respiratory failure and on Bi-pap .  2) Aspiration pneumonia R/O sepsis   3) Bronchospasm due to pneumonia '  4) Mild diastolic heart failure and so far under control .  5) Dementia   6) Elevated Troponin I are secondary to demand ischemia and not NON-ST RASHIDA as primary event .  7) H/O Hypertension and BP mostly lower limit of normal .  Plan : 1) I/V fluids 60 cc /hour while patient NPO 2) I/V antibiotics 3) Bronchodilators 4 ) Hold Lasix 5) Monitor hemoglobin and electrolytes 6) Prognosis guarded   Will continue to follow during hospital course and give further recommendations as needed . Thanks for your referral . if any questions please contact me at office ([phone] cell [phone])

## 2023-10-19 NOTE — CHART NOTE - NSCHARTNOTEFT_GEN_A_CORE
Called by RN, pt c/o discomfort on BIPAP, taking off BIPAP mask frequently. Of note, respiratory therapist has tried alternate forms of oxygenation: HFNC, nasal cannula, ventimask, NRB and patient desaturates. RN explained to patient risks and benefits of BIPAP. Patient told RN that she wants to die.  Pt seen and found to be AAOx3 to person, year/month, and situation but is not oriented to place. Patient also requested RN to "give me a needle so I can stab myself and die".     T(F): 98 (10-18-23 @ 20:31), Max: 98 (10-18-23 @ 20:31)  HR: 79 (10-19-23 @ 01:08) (77 - 82)  BP: 93/53 (10-18-23 @ 20:31) (93/53 - 93/53)  RR: 16 (10-18-23 @ 20:31) (16 - 16)  SpO2: 97% (10-19-23 @ 01:08) (97% - 100%)     Assessment/Plan: 89 y/o Female with PMH of HTN, HLD, CVA (w/ Residual ), Depression, GERD presents to  ED BIBA from SNF complaining of shortness of breath ,diagnosed with acute Hypoxic Respiratory Failure with hypoxia and hypercapnia, Acute Pulmonary Edema, Pleural Effusions, and COPD exacerbation.    - Patient now with suicidal ideation  - Patient on 1:1, constant observation  - Discussed with HCP daughter Malathi, agrees with continued use of BIPAP   - RN to call if any changes Called by RN, pt c/o discomfort on BIPAP, taking off BIPAP mask frequently. Of note, respiratory therapist has tried alternate forms of oxygenation: HFNC, nasal cannula, ventimask, NRB and patient desaturates. RN explained to patient risks and benefits of BIPAP. Patient told RN that she wants to die.  Pt seen and found to be AAOx3 to person, year/month, and situation but is not oriented to place. Patient also requested RN to "give me a needle so I can stab myself and die".     T(F): 98 (10-18-23 @ 20:31), Max: 98 (10-18-23 @ 20:31)  HR: 79 (10-19-23 @ 01:08) (77 - 82)  BP: 93/53 (10-18-23 @ 20:31) (93/53 - 93/53)  RR: 16 (10-18-23 @ 20:31) (16 - 16)  SpO2: 97% (10-19-23 @ 01:08) (97% - 100%)     Assessment/Plan: 89 y/o Female with PMH of HTN, HLD, CVA (w/ Residual ), Depression, GERD presents to  ED BIBA from SNF complaining of shortness of breath ,diagnosed with acute Hypoxic Respiratory Failure with hypoxia and hypercapnia, Acute Pulmonary Edema, Pleural Effusions, and COPD exacerbation.    - Patient now with suicidal ideation  - Patient on 1:1, constant observation  - Discussed with HCP daughter Malathi, agrees with continued use of BIPAP   - Primary team to consider psych consult  - RN to call if any changes Called by RN, pt c/o discomfort on BIPAP, taking off BIPAP mask frequently. Of note, respiratory therapist has tried alternate forms of oxygenation: HFNC, nasal cannula, ventimask, NRB and patient desaturates. RN explained to patient risks and benefits of BIPAP. Patient told RN that she wants to die.  Pt seen and found to be AAOx3 to person, year/month, and situation but is not oriented to place. Patient also requested RN to "give me a needle so I can stab myself and die".     T(F): 98 (10-18-23 @ 20:31), Max: 98 (10-18-23 @ 20:31)  HR: 79 (10-19-23 @ 01:08) (77 - 82)  BP: 93/53 (10-18-23 @ 20:31) (93/53 - 93/53)  RR: 16 (10-18-23 @ 20:31) (16 - 16)  SpO2: 97% (10-19-23 @ 01:08) (97% - 100%)     Assessment/Plan: 89 y/o Female with PMH of HTN, HLD, CVA (w/ Residual ), Depression, GERD presents to  ED BIBA from SNF complaining of shortness of breath ,diagnosed with acute Hypoxic Respiratory Failure with hypoxia and hypercapnia, Acute Pulmonary Edema, Pleural Effusions, and COPD exacerbation.    - Patient now with suicidal ideation  - Patient on 1:1, constant observation  - Discussed with HCP daughter Malathi, agrees with continued use of BIPAP   - Primary team to consider psych consult, palliative reconsult for GOC  - RN to call if any changes    _____________________________________________________________________  ADDENDUM  Called by RN, pt on 1:1, crying and agitated on BIPAP.   #Agitation  - Ativan 0.5mg x1  - RN to call if any changes

## 2023-10-19 NOTE — PROGRESS NOTE ADULT - PROBLEM SELECTOR PLAN 5
likely 2/2 to CHF ,POA   Pleural Effusions  - pt p/w acute resp distress, now on BPAP  - imaging reviewed. CT negative for PE w/ R/L sided pleural effusion and diffuse airspace opacities throughout the bilateral lungs.  - cx NGTD  Recommendations:   C/w zosyn  F/u uPNA Ag  Trend temps/WBC  Maintain aspiration precautions  Diuresis, supplemental O2 and additional management per primary team
likely 2/2 to CHF ,POA   Pleural Effusions  - pt p/w acute resp distress, now on BPAP  - imaging reviewed. CT negative for PE w/ R/L sided pleural effusion and diffuse airspace opacities throughout the bilateral lungs.  - cx NGTD  Recommendations:   C/w zosyn  F/u uPNA Ag  Trend temps/WBC  Maintain aspiration precautions  Diuresis, supplemental O2 and additional management per primary team

## 2023-10-19 NOTE — CHART NOTE - NSCHARTNOTEFT_GEN_A_CORE
Spoke to the daughter Malathi on a phone yesterday and today ,events leading to hospitalization ,current status and poor prognosis discussed .Malathi requests palliative care input and more guidance regarding comfort care .She is aware of overnight events and would like to request hospice consult for inpatient hospice facility placement .Discussed with  Teresita ,palliative care MD followup requested

## 2023-10-19 NOTE — GOALS OF CARE CONVERSATION - ADVANCED CARE PLANNING - TREATMENT GUIDELINES
DNR Order/Comfort measures only/Do not re-hospitalize/No blood draws/No artificial nutrition/No antibiotics

## 2023-10-19 NOTE — PROGRESS NOTE ADULT - SUBJECTIVE AND OBJECTIVE BOX
PROGRESS NOTE  Patient is a 89y old  Female who presents with a chief complaint of SOB AND HYPOXIA (19 Oct 2023 09:00)    Chart and available morning labs /imaging are reviewed electronically , urgent issues addressed . More information  is being added upon completion of rounds , when more information is collected and management discussed with consultants , medical staff and social service/case management on the floor   OVERNIGHT      HPI:  89 y/o Female with PMH of HTN, HLD, CVA (w/ Residual ), Depression, GERD presents to  ED BIBA from SNF complaining of shortness of breath ,diagnosed with   1. Acute Hypoxic Respiratory Failure with hypoxia and hypercapnia   2. Acute Pulmonary Edema   3. Pleural Effusions  4. COPD exacerbation Admitted  to telemetry unit for monitoring , send 3 sets of cardiac enzymes to rule out acute coronary event, obtain ECHO to evaluate LVEF, cardiology consult  ,continue current management, O2 supply, anticoagulation plan as per cardiology consult Seen by ICU in ER -didnt require icu admission , seen by pulm /intensivnist and cardiology on admission  -On arrival to ED, patient in significant respiratory distress, hypoxic to 70s. Placed on NC with minimal improvement and work of breathing. Transitioned to NIPPV with significant improvement in oxygenation. CXR on admission with B/L hazy infiltrates, likely component of acute pulmonary edema. Recommend diuresis with Lasix. Order TTE, with Cardiology follow up in AM. Clinically with significant improvement in respiratory status. Palliative care consult requested ,to discuss advance directives and complete MOLST -DNR,DNI ,NO PEG ,LIMITED MED INTERVENTIONS   -Given profound hypoxia, concern for possible PE. Ddimer negative, with absent tachycardia and hypotension. Low likelihood. CT Angio ordered, with No pulmonary embolism., Moderate right pleural effusion and small left pleural effusion with associated atelectasis. Diffuse airspace opacities throughout the bilateral lungs. US Duplex B/L LE pending.   -No overt s/s of underlying cardiac event. Patient does not endorse chest pain/ pressure. Troponin negative x1, repeat pending. EKG on admission with no acute pathology.   -Respiratory status likely multifactorial in setting of acute pulmonary edema v pneumonia. CT chest with diffuse opacities in B/L lungs, concerning for underlying infection. s/p Vancomycin and Zosyn in ED. However, given possibility of superimposed infection would continue empiric coverage with Zosyn. Recommend BCx, SCx, UA, UCx, Legionella and Strep Pneumo.  (17 Oct 2023 06:48)    PAST MEDICAL & SURGICAL HISTORY:  GERD (gastroesophageal reflux disease)      CVA (cerebrovascular accident)      HTN (hypertension)      HLD (hyperlipidemia)      Cognitive communication deficit      Difficulty in walking      Diverticulosis of intestine      Leg edema      Hypothyroidism      Insomnia      MDD (major depressive disorder)      Muscle weakness      Neuralgia and neuritis      Cataract      Pain in right knee      Acute UTI      Urinary tract infection      Urinary incontinence      2019 novel coronavirus disease (COVID-19)      H/O gastroenteritis      History of colitis          MEDICATIONS  (STANDING):  albuterol/ipratropium for Nebulization 3 milliLiter(s) Nebulizer every 6 hours  dextrose 5% + sodium chloride 0.45%. 1000 milliLiter(s) (60 mL/Hr) IV Continuous <Continuous>  enoxaparin Injectable 30 milliGRAM(s) SubCutaneous every 24 hours  influenza  Vaccine (HIGH DOSE) 0.7 milliLiter(s) IntraMuscular once  lactobacillus acidophilus 1 Tablet(s) Oral every 12 hours  methylPREDNISolone sodium succinate Injectable 40 milliGRAM(s) IV Push daily  mupirocin 2% Nasal 1 Application(s) Both Nostrils every 12 hours  pantoprazole  Injectable 40 milliGRAM(s) IV Push daily  piperacillin/tazobactam IVPB.. 3.375 Gram(s) IV Intermittent every 8 hours    MEDICATIONS  (PRN):  acetaminophen     Tablet .. 650 milliGRAM(s) Oral every 6 hours PRN Temp greater or equal to 38C (100.4F), Mild Pain (1 - 3)  aluminum hydroxide/magnesium hydroxide/simethicone Suspension 30 milliLiter(s) Oral every 4 hours PRN Dyspepsia  melatonin 3 milliGRAM(s) Oral at bedtime PRN Insomnia  midodrine. 5 milliGRAM(s) Oral three times a day PRN for systolic BP<100  ondansetron Injectable 4 milliGRAM(s) IV Push every 8 hours PRN Nausea and/or Vomiting      OBJECTIVE    T(C): 36.7 (10-18-23 @ 20:31), Max: 36.7 (10-18-23 @ 20:31)  HR: 93 (10-19-23 @ 08:58) (77 - 95)  BP: 93/53 (10-18-23 @ 20:31) (93/53 - 99/60)  RR: 16 (10-18-23 @ 20:31) (16 - 16)  SpO2: 91% (10-19-23 @ 08:58) (91% - 100%)  Wt(kg): --  I&O's Summary    18 Oct 2023 07:01  -  19 Oct 2023 07:00  --------------------------------------------------------  IN: 0 mL / OUT: 600 mL / NET: -600 mL          REVIEW OF SYSTEMS:  CONSTITUTIONAL: No fever, weight loss, or fatigue  EYES: No eye pain, visual disturbances, or discharge  ENMT:   No sinus or throat pain  NECK: No pain or stiffness  RESPIRATORY: No cough, wheezing, chills or hemoptysis; No shortness of breath  CARDIOVASCULAR: No chest pain, palpitations, dizziness, or leg swelling  GASTROINTESTINAL: No abdominal pain. No nausea, vomiting; No diarrhea or constipation. No melena or hematochezia.  GENITOURINARY: No dysuria, frequency, hematuria, or incontinence  NEUROLOGICAL: No headaches, memory loss, loss of strength, numbness, or tremors  SKIN: No itching, burning, rashes, or lesions   MUSCULOSKELETAL: No joint pain or swelling; No muscle, back, or extremity pain    PHYSICAL EXAM:  Appearance: NAD. VS past 24 hrs -as above   HEENT:   Moist oral mucosa. Conjunctiva clear b/l.   Neck : supple  Respiratory: Lungs CTAB.  Gastrointestinal:  Soft, nontender. No rebound. No rigidity. BS present	  Cardiovascular: RRR ,S1S2 present  Neurologic: Non-focal. Moving all extremities.  Extremities: No edema. No erythema. No calf tenderness.  Skin: No rashes, No ecchymoses, No cyanosis.	  wounds ,skin lesions-See skin assesment flow sheet   LABS:                        8.8    8.75  )-----------( 186      ( 19 Oct 2023 06:10 )             29.0     10-19    138  |  100  |  42<H>  ----------------------------<  123<H>  3.7   |  30  |  1.20    Ca    8.1<L>      19 Oct 2023 06:10  Phos  3.8     10-19  Mg     2.4     10-18    TPro  6.6  /  Alb  2.5<L>  /  TBili  0.5  /  DBili  x   /  AST  38<H>  /  ALT  21  /  AlkPhos  65  10-19    CAPILLARY BLOOD GLUCOSE        PT/INR - ( 19 Oct 2023 06:10 )   PT: 16.1 sec;   INR: 1.39 ratio           Urinalysis Basic - ( 19 Oct 2023 06:10 )    Color: x / Appearance: x / SG: x / pH: x  Gluc: 123 mg/dL / Ketone: x  / Bili: x / Urobili: x   Blood: x / Protein: x / Nitrite: x   Leuk Esterase: x / RBC: x / WBC x   Sq Epi: x / Non Sq Epi: x / Bacteria: x        Culture - Urine (collected 16 Oct 2023 20:43)  Source: Clean Catch Clean Catch (Midstream)  Final Report (18 Oct 2023 00:29):    No growth    Culture - Blood (collected 16 Oct 2023 17:10)  Source: .Blood Blood-Peripheral  Preliminary Report (18 Oct 2023 23:01):    No growth at 48 Hours    Culture - Blood (collected 16 Oct 2023 17:05)  Source: .Blood Blood-Peripheral  Preliminary Report (18 Oct 2023 23:01):    No growth at 48 Hours      RADIOLOGY & ADDITIONAL TESTS:   reviewed elctronically  ASSESSMENT/PLAN: 	     PROGRESS NOTE  Patient is a 89y old  Female who presents with a chief complaint of SOB AND HYPOXIA (19 Oct 2023 09:00)  - Patient now with suicidal ideation ,case d/w Dr Irwin   - Patient on 1:1, constant observation  - Discussed with HCP daughter Malathi, agrees with continued use of BIPAP Spoke to the daughter Malathi on a phone yesterday and today ,events leading to hospitalization ,current status and poor prognosis discussed .Malathi requests palliative care input and more guidance regarding comfort care .She is aware of overnight events and would like to request hospice consult for inpatient hospice facility placement .Discussed with  Teresita ,palliative care MD followup requested.  - Primary team to consider psych consult, palliative reconsult for GOC  - RN to call if any changes  Chart and available morning labs /imaging are reviewed electronically , urgent issues addressed . More information  is being added upon completion of rounds , when more information is collected and management discussed with consultants , medical staff and social service/case management on the floor   OVERNIGHT   Patient is resting in a bed comfortably remains on hi flow  .No distress noted     HPI:  89 y/o Female with PMH of HTN, HLD, CVA (w/ Residual ), Depression, GERD presents to  ED BIBA from SNF complaining of shortness of breath ,diagnosed with   1. Acute Hypoxic Respiratory Failure with hypoxia and hypercapnia   2. Acute Pulmonary Edema   3. Pleural Effusions  4. COPD exacerbation Admitted  to telemetry unit for monitoring , send 3 sets of cardiac enzymes to rule out acute coronary event, obtain ECHO to evaluate LVEF, cardiology consult  ,continue current management, O2 supply, anticoagulation plan as per cardiology consult Seen by ICU in ER -didnt require icu admission , seen by pulm /intensivnist and cardiology on admission  -On arrival to ED, patient in significant respiratory distress, hypoxic to 70s. Placed on NC with minimal improvement and work of breathing. Transitioned to NIPPV with significant improvement in oxygenation. CXR on admission with B/L hazy infiltrates, likely component of acute pulmonary edema. Recommend diuresis with Lasix. Order TTE, with Cardiology follow up in AM. Clinically with significant improvement in respiratory status. Palliative care consult requested ,to discuss advance directives and complete MOLST -DNR,DNI ,NO PEG ,LIMITED MED INTERVENTIONS   -Given profound hypoxia, concern for possible PE. Ddimer negative, with absent tachycardia and hypotension. Low likelihood. CT Angio ordered, with No pulmonary embolism., Moderate right pleural effusion and small left pleural effusion with associated atelectasis. Diffuse airspace opacities throughout the bilateral lungs. US Duplex B/L LE pending.   -No overt s/s of underlying cardiac event. Patient does not endorse chest pain/ pressure. Troponin negative x1, repeat pending. EKG on admission with no acute pathology.   -Respiratory status likely multifactorial in setting of acute pulmonary edema v pneumonia. CT chest with diffuse opacities in B/L lungs, concerning for underlying infection. s/p Vancomycin and Zosyn in ED. However, given possibility of superimposed infection would continue empiric coverage with Zosyn. Recommend BCx, SCx, UA, UCx, Legionella and Strep Pneumo.  (17 Oct 2023 06:48)    PAST MEDICAL & SURGICAL HISTORY:  GERD (gastroesophageal reflux disease)      CVA (cerebrovascular accident)      HTN (hypertension)      HLD (hyperlipidemia)      Cognitive communication deficit      Difficulty in walking      Diverticulosis of intestine      Leg edema      Hypothyroidism      Insomnia      MDD (major depressive disorder)      Muscle weakness      Neuralgia and neuritis      Cataract      Pain in right knee      Acute UTI      Urinary tract infection      Urinary incontinence      2019 novel coronavirus disease (COVID-19)      H/O gastroenteritis      History of colitis          MEDICATIONS  (STANDING):  albuterol/ipratropium for Nebulization 3 milliLiter(s) Nebulizer every 6 hours  dextrose 5% + sodium chloride 0.45%. 1000 milliLiter(s) (60 mL/Hr) IV Continuous <Continuous>  enoxaparin Injectable 30 milliGRAM(s) SubCutaneous every 24 hours  influenza  Vaccine (HIGH DOSE) 0.7 milliLiter(s) IntraMuscular once  lactobacillus acidophilus 1 Tablet(s) Oral every 12 hours  methylPREDNISolone sodium succinate Injectable 40 milliGRAM(s) IV Push daily  mupirocin 2% Nasal 1 Application(s) Both Nostrils every 12 hours  pantoprazole  Injectable 40 milliGRAM(s) IV Push daily  piperacillin/tazobactam IVPB.. 3.375 Gram(s) IV Intermittent every 8 hours    MEDICATIONS  (PRN):  acetaminophen     Tablet .. 650 milliGRAM(s) Oral every 6 hours PRN Temp greater or equal to 38C (100.4F), Mild Pain (1 - 3)  aluminum hydroxide/magnesium hydroxide/simethicone Suspension 30 milliLiter(s) Oral every 4 hours PRN Dyspepsia  melatonin 3 milliGRAM(s) Oral at bedtime PRN Insomnia  midodrine. 5 milliGRAM(s) Oral three times a day PRN for systolic BP<100  ondansetron Injectable 4 milliGRAM(s) IV Push every 8 hours PRN Nausea and/or Vomiting      OBJECTIVE    T(C): 36.7 (10-18-23 @ 20:31), Max: 36.7 (10-18-23 @ 20:31)  HR: 93 (10-19-23 @ 08:58) (77 - 95)  BP: 93/53 (10-18-23 @ 20:31) (93/53 - 99/60)  RR: 16 (10-18-23 @ 20:31) (16 - 16)  SpO2: 91% (10-19-23 @ 08:58) (91% - 100%)  Wt(kg): --  I&O's Summary    18 Oct 2023 07:01  -  19 Oct 2023 07:00  --------------------------------------------------------  IN: 0 mL / OUT: 600 mL / NET: -600 mL          REVIEW OF SYSTEMS:  CONSTITUTIONAL: No fever, weight loss, or fatigue  EYES: No eye pain, visual disturbances, or discharge  ENMT:   No sinus or throat pain  NECK: No pain or stiffness  RESPIRATORY: No cough, wheezing, chills or hemoptysis; No shortness of breath  CARDIOVASCULAR: No chest pain, palpitations, dizziness, or leg swelling  GASTROINTESTINAL: No abdominal pain. No nausea, vomiting; No diarrhea or constipation. No melena or hematochezia.  GENITOURINARY: No dysuria, frequency, hematuria, or incontinence  NEUROLOGICAL: No headaches, memory loss, loss of strength, numbness, or tremors  SKIN: No itching, burning, rashes, or lesions   MUSCULOSKELETAL: No joint pain or swelling; No muscle, back, or extremity pain    PHYSICAL EXAM:  Appearance: NAD. VS past 24 hrs -as above   HEENT:   Moist oral mucosa. Conjunctiva clear b/l.   Neck : supple  Respiratory: Lungs CTAB.  Gastrointestinal:  Soft, nontender. No rebound. No rigidity. BS present	  Cardiovascular: RRR ,S1S2 present  Neurologic: Non-focal. Moving all extremities.  Extremities: No edema. No erythema. No calf tenderness.  Skin: No rashes, No ecchymoses, No cyanosis.	  wounds ,skin lesions-See skin assesment flow sheet   LABS:                        8.8    8.75  )-----------( 186      ( 19 Oct 2023 06:10 )             29.0     10-19    138  |  100  |  42<H>  ----------------------------<  123<H>  3.7   |  30  |  1.20    Ca    8.1<L>      19 Oct 2023 06:10  Phos  3.8     10-19  Mg     2.4     10-18    TPro  6.6  /  Alb  2.5<L>  /  TBili  0.5  /  DBili  x   /  AST  38<H>  /  ALT  21  /  AlkPhos  65  10-19    CAPILLARY BLOOD GLUCOSE        PT/INR - ( 19 Oct 2023 06:10 )   PT: 16.1 sec;   INR: 1.39 ratio           Urinalysis Basic - ( 19 Oct 2023 06:10 )    Color: x / Appearance: x / SG: x / pH: x  Gluc: 123 mg/dL / Ketone: x  / Bili: x / Urobili: x   Blood: x / Protein: x / Nitrite: x   Leuk Esterase: x / RBC: x / WBC x   Sq Epi: x / Non Sq Epi: x / Bacteria: x        Culture - Urine (collected 16 Oct 2023 20:43)  Source: Clean Catch Clean Catch (Midstream)  Final Report (18 Oct 2023 00:29):    No growth    Culture - Blood (collected 16 Oct 2023 17:10)  Source: .Blood Blood-Peripheral  Preliminary Report (18 Oct 2023 23:01):    No growth at 48 Hours    Culture - Blood (collected 16 Oct 2023 17:05)  Source: .Blood Blood-Peripheral  Preliminary Report (18 Oct 2023 23:01):    No growth at 48 Hours      RADIOLOGY & ADDITIONAL TESTS:   reviewed elctronically  ASSESSMENT/PLAN: 	  25 minutes aggregate time was spent on this visit, 50% visit time spent in care co-ordination with other attendings and counselling patient .I have discussed care plan with patient / HCP/family member ,who expressed understanding of problems treatment and their effect and side effects, alternatives in details. I have asked if they have any questions and concerns and appropriately addressed them to best of my ability. ACP-Advance care planning was discussed , pallitaive care issues ,CMO ,GOC ,MOLST  form ,advance directives were reviewed .All questions were answered to the best of my knowledge - 25 min spent. Hospice evaluation requested due to poor prognosis .Case discussed with palliative care representative,comfort care and palliative measures seem to be appropriate level of care at this point

## 2023-10-19 NOTE — PROGRESS NOTE ADULT - SUBJECTIVE AND OBJECTIVE BOX
[facility], Division of Infectious Diseases  MARTINEZ William Y. Patel, S. Shah, [facility]. [facility]  [phone]    Name: JOSE ROY  Age: 89y  Gender: Female  MRN: [id number]    Interval History:  Patient seen and examined at bedside this morning  No acute overnight events.   Notes reviewed    Antibiotics:  piperacillin/tazobactam IVPB.. 3.375 Gram(s) IV Intermittent every 8 hours      Medications:  acetaminophen     Tablet .. 650 milliGRAM(s) Oral every 6 hours PRN  albuterol/ipratropium for Nebulization 3 milliLiter(s) Nebulizer every 6 hours  aluminum hydroxide/magnesium hydroxide/simethicone Suspension 30 milliLiter(s) Oral every 4 hours PRN  dextrose 5% + sodium chloride 0.45%. 1000 milliLiter(s) IV Continuous <Continuous>  enoxaparin Injectable 30 milliGRAM(s) SubCutaneous every 24 hours  influenza  Vaccine (HIGH DOSE) 0.7 milliLiter(s) IntraMuscular once  lactobacillus acidophilus 1 Tablet(s) Oral every 12 hours  melatonin 3 milliGRAM(s) Oral at bedtime PRN  methylPREDNISolone sodium succinate Injectable 40 milliGRAM(s) IV Push daily  midodrine. 5 milliGRAM(s) Oral three times a day PRN  mupirocin 2% Nasal 1 Application(s) Both Nostrils every 12 hours  ondansetron Injectable 4 milliGRAM(s) IV Push every 8 hours PRN  pantoprazole  Injectable 40 milliGRAM(s) IV Push daily  piperacillin/tazobactam IVPB.. 3.375 Gram(s) IV Intermittent every 8 hours      Review of Systems:  A 10-point review of systems was obtained.     Pertinent positives and negatives--  Constitutional: No fevers. No Chills. No Rigors.   Cardiovascular: No chest pain. No palpitations.  Respiratory: No shortness of breath. No cough.  Gastrointestinal: No nausea or vomiting. No diarrhea or constipation.   Psychiatric: Pleasant. Appropriate affect.    Review of systems otherwise negative except as previously noted.    Allergies: No Known Allergies    For details regarding the patient's past medical history, social history, family history, and other miscellaneous elements, please refer the initial infectious diseases consultation and/or the admitting history and physical examination for this admission.    Objective:  Vitals:   T(C): 36.7 (10-18-23 @ 20:31), Max: 36.7 (10-18-23 @ 20:31)  HR: 77 (10-19-23 @ 11:59) (77 - 95)  BP: 93/53 (10-18-23 @ 20:31) (93/53 - 99/60)  RR: 16 (10-18-23 @ 20:31) (16 - 16)  SpO2: 99% (10-19-23 @ 11:59) (91% - 100%)    Physical Examination:  General: elderly W, on BPAP, iWOB  HEENT: NC/AT, EOMI, anicteric  Lungs: on BPAP  Heart: RRR  Abdomen: Soft. NTND  Extremities: No cyanosis or clubbing. No edema.   Skin: Warm. Dry. Good turgor. No rash.        Laboratory Studies:  CBC:                       8.8    8.75  )-----------( 186      ( 19 Oct 2023 06:10 )             29.0     CMP: 10-19    138  |  100  |  42<H>  ----------------------------<  123<H>  3.7   |  30  |  1.20    Ca    8.1<L>      19 Oct 2023 06:10  Phos  3.8     10-19  Mg     2.4     10-18    TPro  6.6  /  Alb  2.5<L>  /  TBili  0.5  /  DBili  x   /  AST  38<H>  /  ALT  21  /  AlkPhos  65  10-19    LIVER FUNCTIONS - ( 19 Oct 2023 06:10 )  Alb: 2.5 g/dL / Pro: 6.6 g/dL / ALK PHOS: 65 U/L / ALT: 21 U/L / AST: 38 U/L / GGT: x           Urinalysis Basic - ( 19 Oct 2023 06:10 )    Color: x / Appearance: x / SG: x / pH: x  Gluc: 123 mg/dL / Ketone: x  / Bili: x / Urobili: x   Blood: x / Protein: x / Nitrite: x   Leuk Esterase: x / RBC: x / WBC x   Sq Epi: x / Non Sq Epi: x / Bacteria: x        Microbiology: reviewed    Culture - Urine (collected 10-16-23 @ 20:43)  Source: Clean Catch Clean Catch (Midstream)  Final Report (10-18-23 @ 00:29):    No growth    Culture - Blood (collected 10-16-23 @ 17:10)  Source: .Blood Blood-Peripheral  Preliminary Report (10-18-23 @ 23:01):    No growth at 48 Hours    Culture - Blood (collected 10-16-23 @ 17:05)  Source: .Blood Blood-Peripheral  Preliminary Report (10-18-23 @ 23:01):    No growth at 48 Hours          Radiology: reviewed

## 2023-10-19 NOTE — PROGRESS NOTE ADULT - SUBJECTIVE AND OBJECTIVE BOX
Date/Time Patient Seen:  		  Referring MD:   Data Reviewed	       Patient is a 89y old  Female who presents with a chief complaint of SOB AND HYPOXIA (18 Oct 2023 14:56)      Subjective/HPI     PAST MEDICAL & SURGICAL HISTORY:  GERD (gastroesophageal reflux disease)    CVA (cerebrovascular accident)    HTN (hypertension)    HLD (hyperlipidemia)    Cognitive communication deficit    Difficulty in walking    Diverticulosis of intestine    Leg edema    Hypothyroidism    Insomnia    MDD (major depressive disorder)    Muscle weakness    Neuralgia and neuritis    Cataract    Pain in right knee    Acute UTI    Urinary tract infection    Urinary incontinence    2019 novel coronavirus disease (COVID-19)    H/O gastroenteritis    History of colitis          Medication list         MEDICATIONS  (STANDING):  albuterol/ipratropium for Nebulization 3 milliLiter(s) Nebulizer every 6 hours  enoxaparin Injectable 30 milliGRAM(s) SubCutaneous every 24 hours  influenza  Vaccine (HIGH DOSE) 0.7 milliLiter(s) IntraMuscular once  lactobacillus acidophilus 1 Tablet(s) Oral every 12 hours  methylPREDNISolone sodium succinate Injectable 40 milliGRAM(s) IV Push daily  mupirocin 2% Nasal 1 Application(s) Both Nostrils every 12 hours  pantoprazole  Injectable 40 milliGRAM(s) IV Push daily  piperacillin/tazobactam IVPB.. 3.375 Gram(s) IV Intermittent every 8 hours  sodium chloride 0.45%. 1000 milliLiter(s) (40 mL/Hr) IV Continuous <Continuous>    MEDICATIONS  (PRN):  acetaminophen     Tablet .. 650 milliGRAM(s) Oral every 6 hours PRN Temp greater or equal to 38C (100.4F), Mild Pain (1 - 3)  aluminum hydroxide/magnesium hydroxide/simethicone Suspension 30 milliLiter(s) Oral every 4 hours PRN Dyspepsia  melatonin 3 milliGRAM(s) Oral at bedtime PRN Insomnia  midodrine. 5 milliGRAM(s) Oral three times a day PRN for systolic BP<100  ondansetron Injectable 4 milliGRAM(s) IV Push every 8 hours PRN Nausea and/or Vomiting         Vitals log        ICU Vital Signs Last 24 Hrs  T(C): 36.7 (18 Oct 2023 20:31), Max: 36.7 (18 Oct 2023 11:16)  T(F): 98 (18 Oct 2023 20:31), Max: 98 (18 Oct 2023 11:16)  HR: 79 (19 Oct 2023 01:08) (75 - 82)  BP: 93/53 (18 Oct 2023 20:31) (92/52 - 99/60)  BP(mean): --  ABP: --  ABP(mean): --  RR: 16 (18 Oct 2023 20:31) (16 - 19)  SpO2: 97% (19 Oct 2023 01:08) (97% - 100%)    O2 Parameters below as of 19 Oct 2023 01:08  Patient On (Oxygen Delivery Method): ventilator, 100%                 Input and Output:  I&O's Detail    17 Oct 2023 07:01  -  18 Oct 2023 07:00  --------------------------------------------------------  IN:  Total IN: 0 mL    OUT:    Voided (mL): 400 mL  Total OUT: 400 mL    Total NET: -400 mL      18 Oct 2023 07:01  -  19 Oct 2023 05:43  --------------------------------------------------------  IN:  Total IN: 0 mL    OUT:    Voided (mL): 600 mL  Total OUT: 600 mL    Total NET: -600 mL          Lab Data                        10.1   10.82 )-----------( 221      ( 18 Oct 2023 06:56 )             33.1     10-18    140  |  102  |  36<H>  ----------------------------<  146<H>  4.1   |  30  |  1.40<H>    Ca    8.1<L>      18 Oct 2023 09:34  Phos  4.9     10-18  Mg     2.4     10-18    TPro  6.5  /  Alb  2.7<L>  /  TBili  0.5  /  DBili  x   /  AST  34  /  ALT  17  /  AlkPhos  65  10-18    ABG - ( 19 Oct 2023 05:00 )  pH, Arterial: 7.34  pH, Blood: x     /  pCO2: 54    /  pO2: 64    / HCO3: 29    / Base Excess: 3.3   /  SaO2: 92.7                    Review of Systems	      Objective     Physical Examination    heart s1s2  lung dc BS      Pertinent Lab findings & Imaging      Servando:  NO   Adequate UO     I&O's Detail    17 Oct 2023 07:01  -  18 Oct 2023 07:00  --------------------------------------------------------  IN:  Total IN: 0 mL    OUT:    Voided (mL): 400 mL  Total OUT: 400 mL    Total NET: -400 mL      18 Oct 2023 07:01  -  19 Oct 2023 05:43  --------------------------------------------------------  IN:  Total IN: 0 mL    OUT:    Voided (mL): 600 mL  Total OUT: 600 mL    Total NET: -600 mL               Discussed with:     Cultures:	        Radiology

## 2023-10-19 NOTE — PROGRESS NOTE ADULT - PROBLEM SELECTOR PLAN 1
2/2 TO ACUTE CHF ,COPD EXACERBATION AND ACUTE PNEUMONIA ,POA -oxygen supply ,serial abgs , imaging ,pulm and cardiology cons are following , telemetry monitoring
2/2 TO ACUTE CHF ,COPD EXACERBATION AND ACUTE PNEUMONIA ,POA -oxygen supply ,serial abgs , imaging ,pulm and cardiology cons are following , telemetry monitoring

## 2023-10-19 NOTE — SOCIAL WORK PROGRESS NOTE - NSSWPROGRESSNOTE_GEN_ALL_CORE
Discussed today at rounds. MD informed me daughter agreed to referral to [facility]. I spoke with daughter Malathi by phone and confirmed she is in agreement. Referral sent to [facility] for eval for [facility]. Social work to follow.

## 2023-10-19 NOTE — PROGRESS NOTE ADULT - REASON FOR ADMISSION
SOB AND HYPOXIA

## 2023-10-19 NOTE — PROGRESS NOTE ADULT - PROBLEM SELECTOR PLAN 8
tele monitoring ,BP monitoring Admitted  to telemetry unit for monitoring , send 3 sets of cardiac enzymes to rule out acute coronary event, obtain ECHO to evaluate LVEF, cardiology consult  ,continue current management, O2 supply, anticoagulation plan as per cardiology consult
tele monitoring ,BP monitoring Admitted  to telemetry unit for monitoring , send 3 sets of cardiac enzymes to rule out acute coronary event, obtain ECHO to evaluate LVEF, cardiology consult  ,continue current management, O2 supply, anticoagulation plan as per cardiology consult

## 2023-10-19 NOTE — GOALS OF CARE CONVERSATION - ADVANCED CARE PLANNING - CONVERSATION DETAILS
spoke with dtr  cmo  dnr dni  opioids prn  benzo prn  no labs  no rrt  no meds  no imaging  daily vitals

## 2023-10-21 LAB
CULTURE RESULTS: SIGNIFICANT CHANGE UP
SPECIMEN SOURCE: SIGNIFICANT CHANGE UP

## 2024-05-09 NOTE — PATIENT PROFILE ADULT - HAS THE PATIENT RECEIVED THE INFLUENZA VACCINE THIS SEASON?
Quality 47: Advance Care Plan: Advance Care Planning discussed and documented; advance care plan or surrogate decision maker documented in the medical record.
Detail Level: Generalized
Quality 226: Preventive Care And Screening: Tobacco Use: Screening And Cessation Intervention: Patient screened for tobacco use and is an ex/non-smoker
yes...

## 2024-06-11 NOTE — PATIENT PROFILE ADULT. - FUNCTIONAL SCREEN CURRENT LEVEL: COMMUNICATION, MLM
Follow up scheduled with Dr. Grossman per Dr. Correa due to his long term.   (0) understands/communicates without difficulty

## 2024-06-21 NOTE — ED PROVIDER NOTE - DURATION
Patient is requesting an order for an diagnostic mammogram,     [phone]    [facility]     Please send the patient an diagnotic code.  She will call and give them that code.       Please advise    Thank you    today

## 2024-07-19 NOTE — PHYSICAL THERAPY INITIAL EVALUATION ADULT - NAME OF CLINICIAN
Continue with Coumadin  Continue with lipid lowering medication, target LDL below 70   Continue with folic acid 1 mg daily  Modify risk factors for stroke (blood pressure, cholesterol, diabetes, smoking cessation etc.. Control)  You need to use your cane at all times as discussed.   Follow up in as needed.  Call if any questions or concerns.   Heidy RN

## 2024-09-23 NOTE — PROGRESS NOTE ADULT - PROBLEM SELECTOR PROBLEM 11
Provider made aware. Robitussin ordered and given
MDD (major depressive disorder)
MDD (major depressive disorder)

## 2025-05-24 NOTE — PHYSICAL THERAPY INITIAL EVALUATION ADULT - PHYSICAL ASSIST/NONPHYSICAL ASSIST: GAIT, REHAB EVAL
Nursing Suicide Assessment Note - Inpatient    Patient in room at time of assessment: Denies current suicidal thoughts. Patient reports Depressed affect. Denies plan or intent to harm self or others. Patient pleasant and polite. Reports feeling safe and will seek staff to maintain safety on unit.       05/24/25 0845   C-SSRS (Frequent Screen)   2. Have you actually had any thoughts of killing yourself? Yes  (pt denies plan or intent. pt feels unsafe to discharge)   3. Have you been thinking about how you might do this? No   4. Have you had these thoughts and had some intention of acting on them? No   5. Have you started to work out or worked out the details of how to kill yourself? Do you intend to carry out this plan?  No   6. Have you done anything, started to do anything, or prepared to do anything to end your life? No   Suicide Evaluation Low Risk - Green       Current C-SSRS score: Low Risk - Green      Protective Factors / Reason for Living: Responsibility to children, Compliance with medications    Interventions:   Implemented the Safety / Recovery Plan     nonverbal cues (demo/gestures)/verbal cues/set-up required/1 person assist